# Patient Record
Sex: FEMALE | Race: WHITE | NOT HISPANIC OR LATINO | Employment: OTHER | URBAN - METROPOLITAN AREA
[De-identification: names, ages, dates, MRNs, and addresses within clinical notes are randomized per-mention and may not be internally consistent; named-entity substitution may affect disease eponyms.]

---

## 2017-02-08 ENCOUNTER — HOSPITAL ENCOUNTER (EMERGENCY)
Facility: HOSPITAL | Age: 52
Discharge: HOME | End: 2017-02-08
Attending: EMERGENCY MEDICINE
Payer: MEDICARE

## 2017-02-08 VITALS
BODY MASS INDEX: 25.69 KG/M2 | HEIGHT: 63 IN | HEART RATE: 99 BPM | SYSTOLIC BLOOD PRESSURE: 123 MMHG | TEMPERATURE: 98 F | RESPIRATION RATE: 16 BRPM | DIASTOLIC BLOOD PRESSURE: 68 MMHG | WEIGHT: 145 LBS | OXYGEN SATURATION: 98 %

## 2017-02-08 DIAGNOSIS — M25.519 SHOULDER PAIN: ICD-10-CM

## 2017-02-08 DIAGNOSIS — S46.912A SHOULDER STRAIN, LEFT, INITIAL ENCOUNTER: Primary | ICD-10-CM

## 2017-02-08 PROCEDURE — 99283 EMERGENCY DEPT VISIT LOW MDM: CPT

## 2017-02-08 PROCEDURE — 25000003 PHARM REV CODE 250: Performed by: NURSE PRACTITIONER

## 2017-02-08 RX ORDER — CYCLOBENZAPRINE HCL 10 MG
10 TABLET ORAL 3 TIMES DAILY PRN
Qty: 15 TABLET | Refills: 0 | Status: SHIPPED | OUTPATIENT
Start: 2017-02-08 | End: 2017-02-13

## 2017-02-08 RX ORDER — TRAMADOL HYDROCHLORIDE 50 MG/1
50 TABLET ORAL EVERY 6 HOURS PRN
Qty: 14 TABLET | Refills: 0 | Status: SHIPPED | OUTPATIENT
Start: 2017-02-08 | End: 2017-02-16 | Stop reason: ALTCHOICE

## 2017-02-08 RX ORDER — TRAMADOL HYDROCHLORIDE 50 MG/1
50 TABLET ORAL
Status: COMPLETED | OUTPATIENT
Start: 2017-02-08 | End: 2017-02-08

## 2017-02-08 RX ADMIN — TRAMADOL HYDROCHLORIDE 50 MG: 50 TABLET, COATED ORAL at 08:02

## 2017-02-08 NOTE — ED NOTES
Bed: Int 25  Expected date:   Expected time:   Means of arrival:   Comments:  Closed     Gigi Gould NP  02/08/17 0802

## 2017-02-08 NOTE — ED PROVIDER NOTES
Encounter Date: 2017       History     Chief Complaint   Patient presents with    Shoulder Pain     pt c/o L shoulder pain x2 months, unrelieved by ibuprofen     Review of patient's allergies indicates:   Allergen Reactions    Methadone Other (See Comments)     HPI Comments: 51 year old female with complaint of left shoulder pain X 1 1/2 months.  Pt reports that she fell 1 1/2 months ago and landed on left shoulder.  Pt never had x-ray.  Reports constant sharp pain that is worse movement and unrelieved with over the counter medication.      Past Medical History   Diagnosis Date    Anxiety     Bipolar 1 disorder     Hypothyroid      Past Medical History Pertinent Negatives   Diagnosis Date Noted    Anticoagulant long-term use 2017    Arthritis 2017     Past Surgical History   Procedure Laterality Date     section      Gastric bypass       History reviewed. No pertinent family history.  Social History   Substance Use Topics    Smoking status: Current Every Day Smoker     Packs/day: 0.50     Types: Cigarettes    Smokeless tobacco: Never Used    Alcohol use No     Review of Systems   Constitutional: Negative for fever.   HENT: Negative for sore throat.    Respiratory: Negative for shortness of breath.    Cardiovascular: Negative for chest pain.   Gastrointestinal: Negative for nausea.   Genitourinary: Negative for dysuria.   Musculoskeletal: Negative for back pain.        Left shoulder   Skin: Negative for rash.   Neurological: Negative for weakness.   Hematological: Does not bruise/bleed easily.       Physical Exam   Initial Vitals   BP Pulse Resp Temp SpO2   17 0758 17 0758 17 0758 17 0758 17 0758   123/68 99 16 97.6 °F (36.4 °C) 98 %     Physical Exam    Nursing note and vitals reviewed.  Constitutional: She appears well-developed and well-nourished.   HENT:   Head: Normocephalic and atraumatic.   Eyes: Conjunctivae and EOM are normal. Pupils are equal,  round, and reactive to light.   Neck: Normal range of motion. Neck supple.   Cardiovascular: Normal rate, regular rhythm, normal heart sounds and intact distal pulses.   Pulmonary/Chest: Breath sounds normal.   Abdominal: Soft. There is no tenderness. There is no rebound and no guarding.   Musculoskeletal: Normal range of motion.   Left lateral shoulder tenderness, pain with ROM of left shoulder, no deformities, 2+ left radial pulse   Neurological: She is alert and oriented to person, place, and time. She has normal strength and normal reflexes.   Skin: Skin is warm and dry.   Psychiatric: She has a normal mood and affect. Her behavior is normal. Thought content normal.         ED Course   Procedures  Labs Reviewed - No data to display       X-Rays:   Independently Interpreted Readings:   Other Readings:  Left humerus: neg  Left shoulder: neg                      ED Course     Clinical Impression:   The primary encounter diagnosis was Shoulder strain, left, initial encounter. A diagnosis of Shoulder pain was also pertinent to this visit.          Gigi Gould NP  02/08/17 0919

## 2017-02-08 NOTE — DISCHARGE INSTRUCTIONS
Muscle Strain in the Extremities  A muscle strain is a stretching and tearing of muscle fibers. This causes pain, especially when you move that muscle. There may also be some swelling and bruising.  Home care  · Keep the hurt area raised to reduce pain and swelling. This is especially important during the first 48 hours.  · Apply an ice pack over the injured area for 15 to 20 minutes every 3 to 6 hours. You should do this for the first 24 to 48 hours. You can make an ice pack by filling a plastic bag that seals at the top with ice cubes and then wrapping it with a thin towel. Be careful not to injure your skin with the ice treatments. Ice should never be applied directly to skin. Continue the use of ice packs for relief of pain and swelling as needed. After 48 hours, apply heat (warm shower or warm bath) for 15 to 20 minutes several times a day, or alternate ice and heat.  · You may use over-the-counter pain medicine to control pain, unless another medicine was prescribed. If you have chronic liver or kidney disease or ever had a stomach ulcer or GI bleeding, talk with your healthcare provider before using these medicines.  · For leg strains: If crutches have been recommended, dont put full weight on the hurt leg until you can do so without pain. You can return to sports when you are able to hop and run on the injured leg without pain.  Follow-up care  Follow up with your healthcare provider, or as advised.  When to seek medical advice  Call your healthcare provider right away if any of these occur:  · The toes of the injured leg become swollen, cold, blue, numb, or tingly  · Pain or swelling increases  Date Last Reviewed: 11/19/2015  © 0049-3491 [employer]. [address] [place] [address], [place] [address]. All rights reserved. This information is not intended as a substitute for professional medical care. Always follow your healthcare professional's instructions.

## 2017-02-08 NOTE — ED AVS SNAPSHOT
[facility]  [address] [facility] [address]               Jo-Ann Wray   2017  8:02 AM   ED    Description:  Female : 1965   Department:  [facility]           Your Care was Coordinated By:     Provider Role From To    Gigi Gould NP Nurse Practitioner 17 0802 --      Reason for Visit     Shoulder Pain           Diagnoses this Visit        Comments    Shoulder strain, left, initial encounter    -  Primary     Shoulder pain           ED Disposition     None           To Do List           Follow-up Information     Follow up with [facility]. Schedule an appointment as soon as possible for a visit in 2 days.    Contact information:    [phone]      [facility]     [facility] Nurse Care [facility] -  Assistance  Registered nurses in the [facility] provide clinical advisement, health education, appointment booking, and other advisory services.  Call for this free service at [phone].             Medications           Message regarding Medications     Verify the changes and/or additions to your medication regime listed below are the same as discussed with your clinician today.  If any of these changes or additions are incorrect, please notify your healthcare provider.        These medications were administered today        Dose Freq    tramadol tablet 50 mg 50 mg ED 1 Time    Sig: Take 1 tablet (50 mg total) by mouth ED 1 Time.    Class: Normal    Route: Oral           Verify that the below list of medications is an accurate representation of the medications you are currently taking.  If none reported, the list may be blank. If incorrect, please contact your healthcare provider. Carry this list with you in case of emergency.           Current Medications     tramadol tablet 50 mg Take 1 tablet (50 mg total) by mouth ED 1 Time.           Clinical Reference Information           Your Vitals  "Were     BP Pulse Temp Resp Height Weight    123/68 (BP Location: Right arm, Patient Position: Sitting) 99 97.6 °F (36.4 °C) (Oral) 16 5' 3" (1.6 m) 65.8 kg (145 lb)    SpO2 BMI             98% 25.69 kg/m2         Allergies as of 2/8/2017        Reactions    Methadone Other (See Comments)      Immunizations Administered on Date of Encounter - 2/8/2017     None      ED Micro, Lab, POCT     None      ED Imaging Orders     Start Ordered       Status Ordering Provider    02/08/17 0823 02/08/17 0823  X-Ray Shoulder Trauma Left  1 time imaging      Ordered     02/08/17 0823 02/08/17 0823  X-Ray Humerus 2 View Left  1 time imaging      Ordered         Discharge Instructions         Muscle Strain in the Extremities  A muscle strain is a stretching and tearing of muscle fibers. This causes pain, especially when you move that muscle. There may also be some swelling and bruising.  Home care  · Keep the hurt area raised to reduce pain and swelling. This is especially important during the first 48 hours.  · Apply an ice pack over the injured area for 15 to 20 minutes every 3 to 6 hours. You should do this for the first 24 to 48 hours. You can make an ice pack by filling a plastic bag that seals at the top with ice cubes and then wrapping it with a thin towel. Be careful not to injure your skin with the ice treatments. Ice should never be applied directly to skin. Continue the use of ice packs for relief of pain and swelling as needed. After 48 hours, apply heat (warm shower or warm bath) for 15 to 20 minutes several times a day, or alternate ice and heat.  · You may use over-the-counter pain medicine to control pain, unless another medicine was prescribed. If you have chronic liver or kidney disease or ever had a stomach ulcer or GI bleeding, talk with your healthcare provider before using these medicines.  · For leg strains: If crutches have been recommended, dont put full weight on the hurt leg until you can do so without " pain. You can return to sports when you are able to hop and run on the injured leg without pain.  Follow-up care  Follow up with your healthcare provider, or as advised.  When to seek medical advice  Call your healthcare provider right away if any of these occur:  · The toes of the injured leg become swollen, cold, blue, numb, or tingly  · Pain or swelling increases  Date Last Reviewed: 11/19/2015  © 0034-9267 [employer]. [address] [place] [address], [place] [address]. All rights reserved. This information is not intended as a substitute for professional medical care. Always follow your healthcare professional's instructions.          Your Scheduled Appointments     Feb 08, 2017  8:40 AM CST   Diagnostic Xray with [facility] [id number]   [facility] ([facility])    [id number] [place] [id number] [phone]              [facility] Sign-Up     Activating your [facility] account is as easy as 1-2-3!     1) Visit [email or web address], select Sign Up Now, enter this activation code and your date of birth, then select Next.  [email or web address]  Expires: 3/25/2017  8:35 AM      2) Create a username and password to use when you visit [facility] in the future and select a security question in case you lose your password and select Next.    3) Enter your e-mail address and click Sign Up!    Additional Information  If you have questions, please e-mail [email or web address] or call [phone] to talk to our [facility] staff. Remember, [facility] is NOT to be used for urgent needs. For medical emergencies, dial 911.         Smoking Cessation     If you would like to quit smoking:   You may be eligible for free services if you are a [place] resident and started smoking cigarettes before September 1, 1988.  Call the Smoking Cessation Trust (SCT) toll free at (190) 974-6207 or (692) 010-8885.   Call 4-800-QUIT-NOW if you do not meet the above criteria.             [facility] complies with applicable Federal civil rights laws and does not discriminate on the basis of race, color, national origin, age, disability, or sex.        Language Assistance Services     ATTENTION: Language assistance services are available, free of charge. Please call [phone].      ATENCIÓN: Si habla pakoañol, tiene a sher disposición servicios gratuitos de asistencia lingüística. Llame al [phone].     CHÚ Ý: N?u b?n nói Ti?ng Vi?t, có các d?ch v? h? tr? ngôn ng? mi?n phí dành cho b?n. G?i s? [phone].

## 2017-02-16 ENCOUNTER — OFFICE VISIT (OUTPATIENT)
Dept: ORTHOPEDICS | Facility: CLINIC | Age: 52
End: 2017-02-16
Payer: MEDICARE

## 2017-02-16 VITALS
SYSTOLIC BLOOD PRESSURE: 123 MMHG | BODY MASS INDEX: 25.7 KG/M2 | DIASTOLIC BLOOD PRESSURE: 67 MMHG | WEIGHT: 145.06 LBS | HEIGHT: 63 IN | HEART RATE: 106 BPM

## 2017-02-16 DIAGNOSIS — S46.812A STRAIN OF DELTOID MUSCLE, LEFT, INITIAL ENCOUNTER: ICD-10-CM

## 2017-02-16 DIAGNOSIS — M12.812 ROTATOR CUFF ARTHROPATHY, LEFT: Primary | ICD-10-CM

## 2017-02-16 DIAGNOSIS — M75.42 IMPINGEMENT SYNDROME OF LEFT SHOULDER: ICD-10-CM

## 2017-02-16 PROCEDURE — 99214 OFFICE O/P EST MOD 30 MIN: CPT | Mod: PBBFAC | Performed by: PHYSICIAN ASSISTANT

## 2017-02-16 PROCEDURE — 99999 PR PBB SHADOW E&M-EST. PATIENT-LVL IV: CPT | Mod: PBBFAC,,, | Performed by: PHYSICIAN ASSISTANT

## 2017-02-16 PROCEDURE — 99203 OFFICE O/P NEW LOW 30 MIN: CPT | Mod: S$PBB,,, | Performed by: PHYSICIAN ASSISTANT

## 2017-02-16 RX ORDER — BENZTROPINE MESYLATE 1 MG/1
TABLET ORAL
COMMUNITY
Start: 2017-02-02 | End: 2017-02-16

## 2017-02-16 RX ORDER — TIOTROPIUM BROMIDE 18 UG/1
CAPSULE ORAL; RESPIRATORY (INHALATION)
COMMUNITY
Start: 2017-02-02 | End: 2018-02-06

## 2017-02-16 RX ORDER — ACAMPROSATE CALCIUM 333 MG/1
TABLET, DELAYED RELEASE ORAL
COMMUNITY
Start: 2017-01-16 | End: 2017-02-16

## 2017-02-16 RX ORDER — TOPIRAMATE 100 MG/1
50 TABLET, FILM COATED ORAL 2 TIMES DAILY
Status: ON HOLD | COMMUNITY
Start: 2017-02-02 | End: 2018-08-21 | Stop reason: HOSPADM

## 2017-02-16 RX ORDER — DIVALPROEX SODIUM 250 MG/1
TABLET, FILM COATED, EXTENDED RELEASE ORAL
COMMUNITY
Start: 2016-12-12 | End: 2017-02-16

## 2017-02-16 RX ORDER — BUSPIRONE HYDROCHLORIDE 10 MG/1
10 TABLET ORAL 2 TIMES DAILY
Status: ON HOLD | COMMUNITY
Start: 2016-12-05 | End: 2018-08-21 | Stop reason: HOSPADM

## 2017-02-16 RX ORDER — TRAZODONE HYDROCHLORIDE 100 MG/1
200 TABLET ORAL NIGHTLY
Status: ON HOLD | COMMUNITY
Start: 2017-02-02 | End: 2018-08-21 | Stop reason: HOSPADM

## 2017-02-16 RX ORDER — TRAZODONE HYDROCHLORIDE 150 MG/1
TABLET ORAL
COMMUNITY
Start: 2016-12-05 | End: 2018-02-06 | Stop reason: SDUPTHER

## 2017-02-16 RX ORDER — TRIAMCINOLONE ACETONIDE 1 MG/ML
LOTION TOPICAL
COMMUNITY
Start: 2016-12-12 | End: 2018-02-06

## 2017-02-16 RX ORDER — DULOXETIN HYDROCHLORIDE 60 MG/1
60 CAPSULE, DELAYED RELEASE ORAL 2 TIMES DAILY
COMMUNITY
Start: 2016-12-20 | End: 2018-02-06

## 2017-02-16 RX ORDER — OMEPRAZOLE 40 MG/1
40 CAPSULE, DELAYED RELEASE ORAL DAILY
Status: ON HOLD | COMMUNITY
Start: 2017-02-02 | End: 2018-10-20

## 2017-02-16 RX ORDER — MIRTAZAPINE 30 MG/1
TABLET, FILM COATED ORAL
COMMUNITY
Start: 2016-12-05 | End: 2017-02-16

## 2017-02-16 RX ORDER — LURASIDONE HYDROCHLORIDE 80 MG/1
80 TABLET, FILM COATED ORAL NIGHTLY
Status: ON HOLD | COMMUNITY
Start: 2017-02-02 | End: 2018-08-21 | Stop reason: HOSPADM

## 2017-02-16 RX ORDER — METHYLPREDNISOLONE 4 MG/1
TABLET ORAL
Qty: 21 PACKAGE | Refills: 0 | Status: SHIPPED | OUTPATIENT
Start: 2017-02-16 | End: 2017-11-18 | Stop reason: ALTCHOICE

## 2017-02-16 RX ORDER — DIVALPROEX SODIUM 500 MG/1
1500 TABLET, DELAYED RELEASE ORAL DAILY
Status: ON HOLD | COMMUNITY
Start: 2017-02-02 | End: 2018-08-21 | Stop reason: HOSPADM

## 2017-02-16 RX ORDER — IPRATROPIUM BROMIDE 17 UG/1
AEROSOL, METERED RESPIRATORY (INHALATION)
COMMUNITY
Start: 2017-02-02 | End: 2018-02-06

## 2017-02-16 RX ORDER — MELOXICAM 15 MG/1
15 TABLET ORAL DAILY
Qty: 30 TABLET | Refills: 2 | Status: SHIPPED | OUTPATIENT
Start: 2017-02-16 | End: 2018-02-06

## 2017-02-16 RX ORDER — LEVOTHYROXINE SODIUM 75 UG/1
75 TABLET ORAL DAILY
Status: ON HOLD | COMMUNITY
Start: 2017-02-02 | End: 2018-08-21 | Stop reason: HOSPADM

## 2017-02-16 RX ORDER — GABAPENTIN 300 MG/1
300 CAPSULE ORAL 3 TIMES DAILY
Status: ON HOLD | COMMUNITY
Start: 2017-02-02 | End: 2018-08-21 | Stop reason: HOSPADM

## 2017-02-16 NOTE — MR AVS SNAPSHOT
[facility]  [address] [facility] [address]  Phone: [phone]  Fax: [phone]                  Jo-Ann Wray   2017 10:00 AM   Office Visit    Description:  Female : 1965   Provider:  Raina An PA-C   Department:  [facility]           Reason for Visit     Left Shoulder - Pain           Diagnoses this Visit        Comments    Rotator cuff arthropathy, left    -  Primary     Impingement syndrome of left shoulder         Strain of deltoid muscle, left, initial encounter                To Do List           Future Appointments        Provider Department Dept Phone    3/17/2017 9:45 AM Raina An PA-C [facility] [phone]      Goals (5 Years of Data)     None      Follow-Up and Disposition     Return in about 4 weeks (around 3/16/2017).       These Medications        Disp Refills Start End    meloxicam (MOBIC) 15 MG tablet 30 tablet 2 2017     Take 1 tablet (15 mg total) by mouth once daily. - Oral    Pharmacy: [address] Ph #: [phone]       methylPREDNISolone (MEDROL DOSEPACK) 4 mg tablet 21 Package 0 2017     use as directed    Pharmacy: [address] Ph #: [phone]         [facility]     [facility] On Call Nurse Care Line - 24/7 Assistance  Registered nurses in the [facility] Center provide clinical advisement, health education, appointment booking, and other advisory services.  Call for this free service at [phone].             Medications           Message regarding Medications     Verify the changes and/or additions to your medication regime listed below are the same as discussed with your clinician today.  If any of these changes or additions are incorrect, please notify your healthcare provider.        START taking these NEW medications        Refills    meloxicam (MOBIC) 15 MG  "tablet 2    Sig: Take 1 tablet (15 mg total) by mouth once daily.    Class: Normal    Route: Oral    methylPREDNISolone (MEDROL DOSEPACK) 4 mg tablet 0    Sig: use as directed    Class: Normal      STOP taking these medications     tramadol (ULTRAM) 50 mg tablet Take 1 tablet (50 mg total) by mouth every 6 (six) hours as needed.    acamprosate (CAMPRAL) 333 mg tablet     benztropine (COGENTIN) 1 MG tablet     mirtazapine (REMERON) 30 MG tablet     divalproex ER (DEPAKOTE ER) 250 MG 24 hr tablet            Verify that the below list of medications is an accurate representation of the medications you are currently taking.  If none reported, the list may be blank. If incorrect, please contact your healthcare provider. Carry this list with you in case of emergency.           Current Medications     ATROVENT HFA 17 mcg/actuation inhaler     busPIRone (BUSPAR) 10 MG tablet     divalproex (DEPAKOTE) 500 MG TbEC     duloxetine (CYMBALTA) 60 MG capsule     gabapentin (NEURONTIN) 300 MG capsule     LATUDA 80 mg Tab tablet     levothyroxine (SYNTHROID) 75 MCG tablet     omeprazole (PRILOSEC) 40 MG capsule     SPIRIVA WITH HANDIHALER 18 mcg inhalation capsule     topiramate (TOPAMAX) 100 MG tablet     trazodone (DESYREL) 100 MG tablet     trazodone (DESYREL) 150 MG tablet     triamcinolone acetonide 0.1% (KENALOG) 0.1 % Lotn     meloxicam (MOBIC) 15 MG tablet Take 1 tablet (15 mg total) by mouth once daily.    methylPREDNISolone (MEDROL DOSEPACK) 4 mg tablet use as directed           Clinical Reference Information           Your Vitals Were     BP Pulse Height Weight BMI    123/67 106 5' 3" (1.6 m) 65.8 kg (145 lb 1 oz) 25.7 kg/m2      Blood Pressure          Most Recent Value    BP  123/67      Allergies as of 2/16/2017     Methadone      Immunizations Administered on Date of Encounter - 2/16/2017     None      Orders Placed During Today's Visit      Normal Orders This Visit    Ambulatory Referral to Physical/Occupational " Therapy       Smoking Cessation     If you would like to quit smoking:   You may be eligible for free services if you are a [place] resident and started smoking cigarettes before September 1, 1988.  Call the Smoking Cessation Trust (SCT) toll free at (959) 107-2531 or (841) 672-1600.   Call 1-800-QUIT-NOW if you do not meet the above criteria.            Language Assistance Services     ATTENTION: Language assistance services are available, free of charge. Please call [phone].      ATENCIÓN: Si habla charlie, tiene a sher disposición servicios gratuitos de asistencia lingüística. Llame al [phone].     CHÚ Ý: N?u b?n nói Ti?ng Vi?t, có các d?ch v? h? tr? ngôn ng? mi?n phí dành cho b?n. G?i s? [phone].         [facility] complies with applicable Federal civil rights laws and does not discriminate on the basis of race, color, national origin, age, disability, or sex.

## 2017-02-16 NOTE — PROGRESS NOTES
Subjective:      Patient ID: Jo-Ann Wray is a 51 y.o. female.    Chief Complaint: Pain of the Left Shoulder    HPI  The patient presents to clinic for evaluation of nondominant left shoulder and arm pain. She is an ED follow-up from 2/8/17. She was prescribed tramadol and diagnosed with shoulder pain. X-rays taken then did not reveal an acute abnormality. 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 She states that she is disabled (learning disability). She has a history of 3 dislocations while growing up.     She fell about 1.5-2 months ago while trying to get on the bus. She missed putting her foot on the step and fell backwards and tried to catch herself with her left arm. She tried to ignore her pain but her pain is not going away. She rates her current pain a 9 on a pain scale of 0-10.   She has no family but lives with [facility]. She relies on the bus for transportation.       Review of Systems   Constitution: Negative for chills, decreased appetite, weight gain and weight loss.   HENT: Negative for congestion, ear pain, hearing loss and sore throat.    Eyes: Negative for blurred vision, double vision, vision loss in left eye, vision loss in right eye and visual disturbance.   Cardiovascular: Negative for chest pain, irregular heartbeat, leg swelling and palpitations.   Respiratory: Negative for cough and shortness of breath.    Endocrine: Negative for cold intolerance and heat intolerance.   Hematologic/Lymphatic: Negative for adenopathy. Does not bruise/bleed easily.   Skin: Negative for color change, nail changes, rash and suspicious lesions.   Musculoskeletal: Positive for joint pain and myalgias.   Gastrointestinal: Negative for abdominal pain, nausea and vomiting.   Genitourinary: Negative for bladder incontinence and dysuria.   Neurological: Negative for dizziness, paresthesias, sensory change and tremors.   Psychiatric/Behavioral: Negative for altered mental status,  depression, memory loss and substance abuse.   Allergic/Immunologic: Negative for hives and persistent infections.         Objective:            General    Vitals reviewed.  Constitutional: She is oriented to person, place, and time. She appears well-developed and well-nourished.   HENT:   Head: Normocephalic.   Nose: Nose normal.   Eyes: EOM are normal. Pupils are equal, round, and reactive to light.   Neck: Normal range of motion. Neck supple.   Cardiovascular: Normal rate and regular rhythm.    Pulmonary/Chest: Effort normal.   Abdominal: Soft. She exhibits no distension. There is no tenderness.   Neurological: She is alert and oriented to person, place, and time.   Psychiatric: She has a normal mood and affect. Her behavior is normal.     General Musculoskeletal Exam   Gait: normal   Pelvic Obliquity: none    Right Ankle/Foot Exam     Alignment   Forefoot Alignment: normal    Tests   Varus tilt: negative  Heel Walk: able to perform  Tiptoe Walk: able to perform    Other   Sensation: normal    Left Ankle/Foot Exam     Alignment   Forefoot Alignment: normal    Tests   Varus tilt: negative  Heel Walk: able to perform  Tiptoe Walk: able to perform    Other   Sensation: normal    Right Knee Exam     Inspection   Erythema: absent  Scars: absent  Effusion: effusion    Tenderness   The patient is experiencing no tenderness.         Range of Motion   Extension: normal   Flexion: normal     Tests   Ligament Examination   Posterior Sag Test: negative  Posterolateral Corner: unstable (>15 degrees difference)  Patella   Patellar Tracking: normal    Other   Sensation: normal    Left Knee Exam     Inspection   Erythema: absent  Scars: absent  Effusion: absent    Tenderness   The patient is experiencing no tenderness.         Range of Motion   Extension: normal   Flexion: normal     Tests   Stability   Posterior Sag Test: negative  Posterolateral Corner: unstable (>15 degrees difference)  Patella   Patellar Tracking:  normal    Other   Sensation: normal    Right Hip Exam     Inspection   Swelling: absent  Bruising: absent    Other   Sensation: normal  Left Hip Exam     Inspection   Swelling: absent  Bruising: absent    Other   Sensation: normal      Back (L-Spine & T-Spine) / Neck (C-Spine) Exam   Back exam is normal.    Tenderness   The patient is tender to palpation of the left scapular.     Neck (C-Spine) Range of Motion   Flexion:     Normal  Extension: Normal  Right Lateral Bend: normal  Left Lateral Bend: normal  Right Rotation: normal  Left Rotation: normal    Spinal Sensation   Right Side Sensation  C-Spine Level: normal   L-Spine Level: normal  S-Spine Level: normal  T-Spine Level: normal  Left Side Sensation  C-Spine Level: normal  L-Spine Level: normal  S-Spine Level: normal  T-Spine Level: normal    Other She has no scoliosis .      Right Hand/Wrist Exam     Inspection   Deformity: Wrist - deformity     Tests   Phalens Sign: negative  Tinels Sign (Medial Nerve): negative  Finkelstein: negative  Cubital Tunnel Compression Test: negative      Other     Neuorologic Exam    Median Distribution: normal  Ulnar Distribution: normal  Radial Distribution: normal      Left Hand/Wrist Exam     Inspection   Deformity: Wrist - absent     Tests   Phalens Sign: negative  Tinels Sign (Medial Nerve): negative  Finkelstein: negative  Cubital Tunnel Compression Test: negative      Other     Sensory Exam  Median Distribution: normal  Ulnar Distribution: normal  Radial Distribution: normal      Right Elbow Exam     Inspection   Effusion: absent  Bruising: absent  Deformity: absent    Tests Tinel's Sign (cubital tunnel): negative    Other   Sensation: normal      Left Elbow Exam     Inspection   Effusion: absent  Bruising: absent  Deformity: absent    Tests Tinel's Sign (cubital tunnel): negative    Other   Sensation: normal    Right Shoulder Exam     Range of Motion   Active Abduction: normal   Passive Abduction: normal   Extension:  normal   Forward Flexion: normal   Forward Elevation: normal  Adduction: normal  External Rotation 0 degrees: normal   Internal Rotation 0 degrees: normal     Tests & Signs   Apprehension: negative  Cross Arm: negative  Impingement: negative    Other   Sensation: normal    Left Shoulder Exam     Inspection/Observation   Swelling: absent    Tenderness   The patient is tender to palpation of the acromioclavicular joint and biceps tendon.    Range of Motion   Active Abduction: abnormal   Passive Abduction: abnormal   Extension: abnormal   Forward Flexion: abnormal   Forward Elevation: abnormal  Adduction: abnormal  External Rotation 0 degrees: abnormal   Internal Rotation 0 degrees: abnormal     Tests & Signs   Apprehension: negative  Cross Arm: positive  Hawkin's test: positive  Impingement: positive  Bear Hug: positive    Other   Sensation: normal     Comments:  Also pain over deltoid      Muscle Strength   Right Upper Extremity   Wrist Extension:    Wrist Flexion:    :    Elbow Pronation:     Elbow Supination:     Elbow Extension:   Elbow Flexion:   Intrinsics:   EPL (Extensor Pollicis Longus):   Left Upper Extremity  Shoulder Abduction: 3/5   Shoulder Internal Rotation: 3/5   Shoulder External Rotation: 3/5   Supraspinatus: 3/5/5   Subscapularis: 3/5/5   Biceps: 3/5/5   Wrist Extension:    Wrist Flexion:    :     Elbow Pronation:     Elbow Supination:     Elbow Extension:   Elbow Flexion:   Intrinsics:   EPL (Extensor Pollicis Longus):   Right Lower Extremity   Hip Abduction:    Quadriceps:     Hamstrin/5   Left Lower Extremity   Hip Abduction:    Quadriceps:     Hamstrin/5     Reflexes     Left Side  Biceps:  2+  Triceps:  2+  Quadriceps:  2+  Achilles:  2+    Right Side   Biceps:  2+  Triceps:  2+  Quadriceps:  2+  Achilles:  2+    Vascular Exam     Right Pulses      Radial:                    2+      Left  Pulses      Radial:                    2+      Capillary Refill  Right Hand: normal capillary refill  Left Hand: normal capillary refill      X-rays:    XR HUMERUS 2 VIEW LEFT    Clinical history: left upper arm pain, injury    Findings: No fracture, suspicious bone marrow lesion or other acute abnormality is seen in the humerus.  Joint alignment appears anatomic.   Impression    No acute abnormality seen in the left humerus     3 view x-ray of the left shoulder    Clinical History:     Pain in the left shoulder    Findings:     There is no fracture. There is no dislocation.   Impression       Normal study.             Assessment:       Encounter Diagnoses   Name Primary?    Rotator cuff arthropathy, left Yes    Impingement syndrome of left shoulder     Strain of deltoid muscle, left, initial encounter           Plan:       Jo-Ann was seen today for pain.    Diagnoses and all orders for this visit:    Rotator cuff arthropathy, left  -     meloxicam (MOBIC) 15 MG tablet; Take 1 tablet (15 mg total) by mouth once daily.  -     methylPREDNISolone (MEDROL DOSEPACK) 4 mg tablet; use as directed  -     Ambulatory Referral to Physical/Occupational Therapy    Impingement syndrome of left shoulder  -     meloxicam (MOBIC) 15 MG tablet; Take 1 tablet (15 mg total) by mouth once daily.  -     methylPREDNISolone (MEDROL DOSEPACK) 4 mg tablet; use as directed  -     Ambulatory Referral to Physical/Occupational Therapy    Strain of deltoid muscle, left, initial encounter  -     meloxicam (MOBIC) 15 MG tablet; Take 1 tablet (15 mg total) by mouth once daily.  -     methylPREDNISolone (MEDROL DOSEPACK) 4 mg tablet; use as directed  -     Ambulatory Referral to Physical/Occupational Therapy      The patient was prescribed an anti-inflammatory and a Medrol Dosepak.  I would like for her to attend outpatient physical therapy twice a week.  The patient was instructed on Codman exercises for home and the use of warm compresses.   She'll return for follow-up in 4 weeks.  If her shoulder pain and range of motion have not improved, we will consider MRI for further evaluation and to rule out rotator cuff pathology and labrum tear.

## 2017-04-07 ENCOUNTER — OFFICE VISIT (OUTPATIENT)
Dept: ORTHOPEDICS | Facility: CLINIC | Age: 52
End: 2017-04-07
Payer: MEDICARE

## 2017-04-07 VITALS
BODY MASS INDEX: 25.7 KG/M2 | HEART RATE: 80 BPM | SYSTOLIC BLOOD PRESSURE: 105 MMHG | DIASTOLIC BLOOD PRESSURE: 71 MMHG | WEIGHT: 145.06 LBS | HEIGHT: 63 IN

## 2017-04-07 DIAGNOSIS — M75.42 IMPINGEMENT SYNDROME OF LEFT SHOULDER: ICD-10-CM

## 2017-04-07 DIAGNOSIS — S46.812A STRAIN OF DELTOID MUSCLE, LEFT, INITIAL ENCOUNTER: ICD-10-CM

## 2017-04-07 DIAGNOSIS — M12.812 ROTATOR CUFF ARTHROPATHY, LEFT: Primary | ICD-10-CM

## 2017-04-07 PROCEDURE — 99999 PR PBB SHADOW E&M-EST. PATIENT-LVL III: CPT | Mod: PBBFAC,,, | Performed by: PHYSICIAN ASSISTANT

## 2017-04-07 PROCEDURE — 99213 OFFICE O/P EST LOW 20 MIN: CPT | Mod: PBBFAC | Performed by: PHYSICIAN ASSISTANT

## 2017-04-07 PROCEDURE — 20610 DRAIN/INJ JOINT/BURSA W/O US: CPT | Mod: S$PBB,LT,, | Performed by: PHYSICIAN ASSISTANT

## 2017-04-07 PROCEDURE — 99213 OFFICE O/P EST LOW 20 MIN: CPT | Mod: S$PBB,25,, | Performed by: PHYSICIAN ASSISTANT

## 2017-04-07 PROCEDURE — 20610 DRAIN/INJ JOINT/BURSA W/O US: CPT | Mod: PBBFAC | Performed by: PHYSICIAN ASSISTANT

## 2017-04-07 RX ORDER — METHYLPREDNISOLONE ACETATE 80 MG/ML
80 INJECTION, SUSPENSION INTRA-ARTICULAR; INTRALESIONAL; INTRAMUSCULAR; SOFT TISSUE
Status: COMPLETED | OUTPATIENT
Start: 2017-04-07 | End: 2017-04-07

## 2017-04-07 RX ADMIN — METHYLPREDNISOLONE ACETATE 80 MG: 80 INJECTION, SUSPENSION INTRALESIONAL; INTRAMUSCULAR; INTRASYNOVIAL; SOFT TISSUE at 08:04

## 2017-04-07 NOTE — PROGRESS NOTES
Subjective:      Patient ID: Jo-Ann Wray is a 51 y.o. female.    Chief Complaint: Pain of the Left Shoulder    HPI    The patient presents to clinic for follow-up of nondominant left shoulder pain.  She states that she has completed a Medrol Dosepak and meloxicam.  The patient was given a referral to attend therapy but states that she had to get therapy at home.  Therapy has discharged her.  She had services with [facility].  The patient still has home health once a week for skilled nursing visits.  Patient states that she has no improvement with her shoulder pain.  Her pain is locazlies above her elbow joint, radiating up to shoulder and scapular region.  The patient rates her current discomfort a 9 on a pain scale of 1-10.    Review of Systems   Constitution: Negative for chills, fever and night sweats.   Respiratory: Negative for cough, shortness of breath and wheezing.    Musculoskeletal: Positive for joint pain and stiffness.   Gastrointestinal: Negative for diarrhea, nausea and vomiting.   Neurological: Negative for brief paralysis.   Psychiatric/Behavioral: Negative for altered mental status.         Objective:            General    Constitutional: She is oriented to person, place, and time. She appears well-developed and well-nourished.   Neck: Normal range of motion.   Cardiovascular: Normal rate and regular rhythm.    Pulmonary/Chest: Effort normal.   Abdominal: Soft.   Neurological: She is alert and oriented to person, place, and time.   Psychiatric: She has a normal mood and affect. Her behavior is normal.         Back (L-Spine & T-Spine) / Neck (C-Spine) Exam     Tenderness   The patient is tender to palpation of the left scapular.       Left Shoulder Exam     Tenderness   The patient is tender to palpation of the acromioclavicular joint, acromion, biceps tendon and greater tuberosity.    Range of Motion   Active Abduction: abnormal   Passive Abduction: abnormal   Extension: abnormal   Forward  Flexion: abnormal   Forward Elevation: abnormal  Adduction: abnormal  External Rotation 0 degrees: abnormal   Internal Rotation 0 degrees: abnormal     Tests & Signs   Cross Arm: positive  Drop Arm: positive  Hawkin's test: positive  Impingement: positive  Rotator Cuff Painful Arc/Range: moderate  Bear Hug: positive      Muscle Strength   Right Upper Extremity   Shoulder Abduction: 4/5   Shoulder Internal Rotation: 4/5   Shoulder External Rotation: 4/5   Supraspinatus: 4/5/5   Subscapularis: 4/5/5   Biceps: 4/5/5   Left Upper Extremity  Shoulder Abduction: 3/5   Shoulder Internal Rotation: 3/5   Shoulder External Rotation: 3/5   Supraspinatus: 3/5/5   Subscapularis: 3/5/5   Biceps: 3/5/5                     Assessment:       Encounter Diagnoses   Name Primary?    Rotator cuff arthropathy, left Yes    Impingement syndrome of left shoulder     Strain of deltoid muscle, left, initial encounter           Plan:       Jo-Ann was seen today for pain.    Diagnoses and all orders for this visit:    Rotator cuff arthropathy, left  -     methylPREDNISolone acetate injection 80 mg; Inject 1 mL (80 mg total) into the articular space one time.    Impingement syndrome of left shoulder  -     methylPREDNISolone acetate injection 80 mg; Inject 1 mL (80 mg total) into the articular space one time.    Strain of deltoid muscle, left, initial encounter  -     methylPREDNISolone acetate injection 80 mg; Inject 1 mL (80 mg total) into the articular space one time.        The patient was given a Depo-Medrol and Xylocaine injection today for left shoulder discomfort.  I instructed her to alternate cold and warm compresses.  The patient was provided with refills during her last visit so she still should have some meloxicam left.  If she has ran out of her prescriptions, she will notify the office and request refills.  I told the patient to continue with at-home exercises demonstrated from her home physical therapist.  She'll return in  approximately 5 weeks for follow-up of her shoulder.  If her pain and range of motion have not improved, I will order a MRI to further evaluate her shoulder joint.        Procedure note:    Preoperative diagnosis: Rotator cuff arthropathy of the left shoulder, impingement syndrome    Postoperative diagnosis: same    Procedure performed Injection  left shoulder(s)     Description:  Timeout was performed and the patient's ALLERGIES were verified.  Her left shoulder was marked with a yes and prepped with Betadine prior to the injection.  Under sterile conditions the patient's left shoulder(s) was injected through the subacromial space with Depro-Medrol 80 and 1 cc of 2% Xylocaine.  The patient tolerated the procedure without incident.  A Band-Aid was applied to the needle site.    Raina An PA-C

## 2017-04-07 NOTE — MR AVS SNAPSHOT
[facility]  [address]  [place] [address]  Phone: [phone]  Fax: [phone]                  Jo-Ann Wray   2017 8:15 AM   Office Visit    Description:  Female : 1965   Provider:  Raina An PA-C   Department:  [facility]           Reason for Visit     Left Shoulder - Pain           Diagnoses this Visit        Comments    Rotator cuff arthropathy, left    -  Primary     Impingement syndrome of left shoulder         Strain of deltoid muscle, left, initial encounter                To Do List           Future Appointments        Provider Department Dept Phone    2017 8:45 AM Raina An PA-C [facility] [phone]      Goals (5 Years of Data)     None      [facility]     [facility] On Call Nurse Care Line -  Assistance  Unless otherwise directed by your provider, please contact [facility], our nurse care line that is available for  assistance.     Registered nurses in the [facility] On Call Center provide: appointment scheduling, clinical advisement, health education, and other advisory services.  Call: [phone] (toll free)               Medications           Message regarding Medications     Verify the changes and/or additions to your medication regime listed below are the same as discussed with your clinician today.  If any of these changes or additions are incorrect, please notify your healthcare provider.        These medications were administered today        Dose Freq    methylPREDNISolone acetate injection 80 mg 80 mg Clinic/HOD 1 time    Sig: Inject 1 mL (80 mg total) into the articular space one time.    Class: Normal    Route: Intra-articular           Verify that the below list of medications is an accurate representation of the medications you are currently taking.  If none reported, the list may be blank. If incorrect, please contact your healthcare provider. Carry this list with you  "in case of emergency.           Current Medications     ATROVENT HFA 17 mcg/actuation inhaler     busPIRone (BUSPAR) 10 MG tablet     divalproex (DEPAKOTE) 500 MG TbEC     duloxetine (CYMBALTA) 60 MG capsule     gabapentin (NEURONTIN) 300 MG capsule     LATUDA 80 mg Tab tablet     levothyroxine (SYNTHROID) 75 MCG tablet     meloxicam (MOBIC) 15 MG tablet Take 1 tablet (15 mg total) by mouth once daily.    methylPREDNISolone (MEDROL DOSEPACK) 4 mg tablet use as directed    omeprazole (PRILOSEC) 40 MG capsule     SPIRIVA WITH HANDIHALER 18 mcg inhalation capsule     topiramate (TOPAMAX) 100 MG tablet     trazodone (DESYREL) 100 MG tablet     trazodone (DESYREL) 150 MG tablet     triamcinolone acetonide 0.1% (KENALOG) 0.1 % Lotn            Clinical Reference Information           Your Vitals Were     Height Weight BMI          5' 3" (1.6 m) 65.8 kg (145 lb 1 oz) 25.7 kg/m2        Allergies as of 4/7/2017     Methadone      Immunizations Administered on Date of Encounter - 4/7/2017     None      Smoking Cessation     If you would like to quit smoking:   You may be eligible for free services if you are a [place] resident and started smoking cigarettes before September 1, 1988.  Call the Smoking Cessation Trust ([facility]) toll free at [phone] or [phone].   Call 1-800-QUIT-NOW if you do not meet the above criteria.   Contact us via email: [email or web address]   View our website for more information: [email or web address]        Language Assistance Services     ATTENTION: Language assistance services are available, free of charge. Please call [phone].      ATENCIÓN: Si habla español, tiene a sher disposición servicios gratuitos de asistencia lingüística. Llame al [phone].     CHÚ Ý: N?u b?n nói Ti?ng Vi?t, có các d?ch v? h? tr? ngôn ng? mi?n phí dành cho b?n. G?i s? [phone].         [facility] complies with applicable Federal civil rights laws and does not discriminate on " the basis of race, color, national origin, age, disability, or sex.

## 2017-05-11 DIAGNOSIS — M25.512 LEFT SHOULDER PAIN, UNSPECIFIED CHRONICITY: Primary | ICD-10-CM

## 2017-05-16 ENCOUNTER — HOSPITAL ENCOUNTER (EMERGENCY)
Facility: HOSPITAL | Age: 52
Discharge: PSYCHIATRIC OR SUBSTANCE USE FACILITY | End: 2017-05-16
Attending: EMERGENCY MEDICINE
Payer: MEDICARE

## 2017-05-16 VITALS
DIASTOLIC BLOOD PRESSURE: 64 MMHG | HEIGHT: 63 IN | BODY MASS INDEX: 26.58 KG/M2 | HEART RATE: 87 BPM | TEMPERATURE: 99 F | RESPIRATION RATE: 18 BRPM | SYSTOLIC BLOOD PRESSURE: 113 MMHG | OXYGEN SATURATION: 95 % | WEIGHT: 150 LBS

## 2017-05-16 DIAGNOSIS — F19.10 SUBSTANCE ABUSE: ICD-10-CM

## 2017-05-16 DIAGNOSIS — R45.89 SUICIDAL BEHAVIOR WITHOUT ATTEMPTED SELF-INJURY: Primary | ICD-10-CM

## 2017-05-16 DIAGNOSIS — R42 DIZZINESS: ICD-10-CM

## 2017-05-16 LAB
ALBUMIN SERPL BCP-MCNC: 3.3 G/DL
ALP SERPL-CCNC: 60 U/L
ALT SERPL W/O P-5'-P-CCNC: 25 U/L
AMPHET+METHAMPHET UR QL: NEGATIVE
ANION GAP SERPL CALC-SCNC: 10 MMOL/L
AST SERPL-CCNC: 40 U/L
B-HCG UR QL: NEGATIVE
BARBITURATES UR QL SCN>200 NG/ML: NEGATIVE
BASOPHILS # BLD AUTO: 0.02 K/UL
BASOPHILS NFR BLD: 0.4 %
BENZODIAZ UR QL SCN>200 NG/ML: NEGATIVE
BILIRUB SERPL-MCNC: 0.7 MG/DL
BILIRUB UR QL STRIP: NEGATIVE
BUN SERPL-MCNC: 8 MG/DL
BZE UR QL SCN: NORMAL
CALCIUM SERPL-MCNC: 8 MG/DL
CANNABINOIDS UR QL SCN: NEGATIVE
CHLORIDE SERPL-SCNC: 106 MMOL/L
CLARITY UR: CLEAR
CO2 SERPL-SCNC: 22 MMOL/L
COLOR UR: YELLOW
CREAT SERPL-MCNC: 0.7 MG/DL
CREAT UR-MCNC: 131.2 MG/DL
DIFFERENTIAL METHOD: ABNORMAL
EOSINOPHIL # BLD AUTO: 0 K/UL
EOSINOPHIL NFR BLD: 0.4 %
ERYTHROCYTE [DISTWIDTH] IN BLOOD BY AUTOMATED COUNT: 15.8 %
EST. GFR  (AFRICAN AMERICAN): >60 ML/MIN/1.73 M^2
EST. GFR  (NON AFRICAN AMERICAN): >60 ML/MIN/1.73 M^2
ETHANOL SERPL-MCNC: <10 MG/DL
GLUCOSE SERPL-MCNC: 97 MG/DL
GLUCOSE UR QL STRIP: NEGATIVE
HCT VFR BLD AUTO: 34.9 %
HGB BLD-MCNC: 11.7 G/DL
HGB UR QL STRIP: NEGATIVE
KETONES UR QL STRIP: NEGATIVE
LEUKOCYTE ESTERASE UR QL STRIP: NEGATIVE
LYMPHOCYTES # BLD AUTO: 1.6 K/UL
LYMPHOCYTES NFR BLD: 29.1 %
MCH RBC QN AUTO: 31.2 PG
MCHC RBC AUTO-ENTMCNC: 33.5 %
MCV RBC AUTO: 93 FL
METHADONE UR QL SCN>300 NG/ML: NEGATIVE
MONOCYTES # BLD AUTO: 0.4 K/UL
MONOCYTES NFR BLD: 7.2 %
NEUTROPHILS # BLD AUTO: 3.4 K/UL
NEUTROPHILS NFR BLD: 62.9 %
NITRITE UR QL STRIP: NEGATIVE
OPIATES UR QL SCN: NEGATIVE
PCP UR QL SCN>25 NG/ML: NEGATIVE
PH UR STRIP: 5 [PH] (ref 5–8)
PLATELET # BLD AUTO: 261 K/UL
PMV BLD AUTO: 9.3 FL
POTASSIUM SERPL-SCNC: 3.8 MMOL/L
PROT SERPL-MCNC: 6.3 G/DL
PROT UR QL STRIP: NEGATIVE
RBC # BLD AUTO: 3.75 M/UL
SODIUM SERPL-SCNC: 138 MMOL/L
SP GR UR STRIP: 1.02 (ref 1–1.03)
TOXICOLOGY INFORMATION: NORMAL
TSH SERPL DL<=0.005 MIU/L-ACNC: 3.12 UIU/ML
URN SPEC COLLECT METH UR: NORMAL
UROBILINOGEN UR STRIP-ACNC: NEGATIVE EU/DL
VALPROATE SERPL-MCNC: <12.5 UG/ML
WBC # BLD AUTO: 5.4 K/UL

## 2017-05-16 PROCEDURE — 84443 ASSAY THYROID STIM HORMONE: CPT

## 2017-05-16 PROCEDURE — 96361 HYDRATE IV INFUSION ADD-ON: CPT

## 2017-05-16 PROCEDURE — 85025 COMPLETE CBC W/AUTO DIFF WBC: CPT

## 2017-05-16 PROCEDURE — 82570 ASSAY OF URINE CREATININE: CPT

## 2017-05-16 PROCEDURE — 81025 URINE PREGNANCY TEST: CPT

## 2017-05-16 PROCEDURE — 93005 ELECTROCARDIOGRAM TRACING: CPT

## 2017-05-16 PROCEDURE — 99285 EMERGENCY DEPT VISIT HI MDM: CPT | Mod: 25

## 2017-05-16 PROCEDURE — 96360 HYDRATION IV INFUSION INIT: CPT

## 2017-05-16 PROCEDURE — 93010 ELECTROCARDIOGRAM REPORT: CPT | Mod: ,,, | Performed by: INTERNAL MEDICINE

## 2017-05-16 PROCEDURE — 25000003 PHARM REV CODE 250: Performed by: EMERGENCY MEDICINE

## 2017-05-16 PROCEDURE — 80320 DRUG SCREEN QUANTALCOHOLS: CPT

## 2017-05-16 PROCEDURE — 80164 ASSAY DIPROPYLACETIC ACD TOT: CPT

## 2017-05-16 PROCEDURE — 80053 COMPREHEN METABOLIC PANEL: CPT

## 2017-05-16 PROCEDURE — G0425 INPT/ED TELECONSULT30: HCPCS | Mod: GT,S$PBB,, | Performed by: PSYCHIATRY & NEUROLOGY

## 2017-05-16 PROCEDURE — 81003 URINALYSIS AUTO W/O SCOPE: CPT

## 2017-05-16 RX ORDER — BENZTROPINE MESYLATE 1 MG/1
1 TABLET ORAL 2 TIMES DAILY
Status: ON HOLD | COMMUNITY
Start: 2017-04-24 | End: 2018-08-21 | Stop reason: HOSPADM

## 2017-05-16 RX ORDER — ACAMPROSATE CALCIUM 333 MG/1
666 TABLET, DELAYED RELEASE ORAL 3 TIMES DAILY
Status: ON HOLD | COMMUNITY
Start: 2017-05-03 | End: 2018-08-21 | Stop reason: HOSPADM

## 2017-05-16 RX ADMIN — SODIUM CHLORIDE 1000 ML: 0.9 INJECTION, SOLUTION INTRAVENOUS at 10:05

## 2017-05-16 NOTE — ED NOTES
PEC process discussed with pt, pt rights packet left at bs. Pt signed Acknowledgment of Notification of Rights.     Pt safeword is Taylor.    Pt emergency contact is Taylor Skinner, [phone]

## 2017-05-16 NOTE — ED NOTES
Pt belongings logged and secured in PEC locked #28.  Belongings include red bag with medications: Campral, inhaler, Depakote, Cymbalta, Neurontin, Latuda, Synthroid, Topamax, Trazodone.   Brown purse, pair of jeans, grey shirt, white shirt, underwear, socks, black pants, green shirt, black shoes, cell phone with , glasses, wallet.
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
## 2017-05-16 NOTE — ED NOTES
Pt has been accepted to [facility] via [facility]. Accepting MD is Dr. Calderon. Call report number is [phone].

## 2017-05-16 NOTE — ED NOTES
2 [employer] workers arrived for pt transport, 2 pt belonging bags given to [employer]. Security called to walk pt out.

## 2017-05-16 NOTE — ED NOTES
Pt lying in bed in NAD, VSS, respirations equal and unlabored. Pt bed is locked and low. Pt is dressed in guevara gown and yellow socks, room is secured per PEC protocol. Pt being directly monitored by leobardo Paredes at this time. Will continue to monitor.

## 2017-05-16 NOTE — ED NOTES
Faxed clinical packet to [facility], [facility], [facility], [facility], [facility], [facility], [facility], [facility], [facility], [facility], [facility], [facility]), [facility] ([facility]), [facility]. Awaiting placement at this time.

## 2017-05-16 NOTE — ED NOTES
Pt ambulated to bathroom with no assistance. Pt brought blanket for comfort per request. leobardo Whitlock at this time performing 15 min checks on pt.

## 2017-05-16 NOTE — ED NOTES
Pt is dressed in grey gown and yellow socks, room is secured per P[facility] protocol. Pt being directly monitored by leobardo Whitlock at this time. Will continue to monitor.

## 2017-08-05 ENCOUNTER — HOSPITAL ENCOUNTER (EMERGENCY)
Facility: HOSPITAL | Age: 52
Discharge: HOME | End: 2017-08-05
Attending: EMERGENCY MEDICINE
Payer: MEDICARE

## 2017-08-05 VITALS
RESPIRATION RATE: 16 BRPM | HEIGHT: 63 IN | HEART RATE: 82 BPM | DIASTOLIC BLOOD PRESSURE: 69 MMHG | WEIGHT: 150 LBS | OXYGEN SATURATION: 97 % | BODY MASS INDEX: 26.58 KG/M2 | TEMPERATURE: 99 F | SYSTOLIC BLOOD PRESSURE: 130 MMHG

## 2017-08-05 DIAGNOSIS — S39.012A LUMBAR STRAIN, INITIAL ENCOUNTER: Primary | ICD-10-CM

## 2017-08-05 PROCEDURE — 99283 EMERGENCY DEPT VISIT LOW MDM: CPT

## 2017-08-05 RX ORDER — TRAMADOL HYDROCHLORIDE 50 MG/1
50 TABLET ORAL EVERY 6 HOURS PRN
Qty: 14 TABLET | Refills: 0 | Status: SHIPPED | OUTPATIENT
Start: 2017-08-05 | End: 2017-08-15

## 2017-08-05 RX ORDER — PREDNISONE 50 MG/1
50 TABLET ORAL DAILY
Qty: 5 TABLET | Refills: 0 | Status: SHIPPED | OUTPATIENT
Start: 2017-08-05 | End: 2017-08-10

## 2017-08-05 NOTE — ED PROVIDER NOTES
Encounter Date: 2017       History     Chief Complaint   Patient presents with    Hip Pain     chronic right hip pain and low back pain x 1 week.     52 year old female with complaint of right lower back pain with radiation into right hip X 1 weeks.  No fall.  NO trauma.  Moderate pain.  Constant aching pain worse with movement.  No alleviating factors. No loss of bowel or bladder function.           Review of patient's allergies indicates:   Allergen Reactions    Methadone Other (See Comments)     Past Medical History:   Diagnosis Date    Anxiety     Bipolar 1 disorder     Hypothyroid      Past Surgical History:   Procedure Laterality Date     SECTION      GASTRIC BYPASS       History reviewed. No pertinent family history.  Social History   Substance Use Topics    Smoking status: Current Every Day Smoker     Packs/day: 0.50     Types: Cigarettes    Smokeless tobacco: Never Used    Alcohol use No     Review of Systems   Constitutional: Negative for fever.   HENT: Negative for sore throat.    Respiratory: Negative for shortness of breath.    Cardiovascular: Negative for chest pain.   Gastrointestinal: Negative for nausea.   Genitourinary: Negative for dysuria.   Musculoskeletal: Positive for back pain.   Skin: Negative for rash.   Neurological: Negative for weakness.   Hematological: Does not bruise/bleed easily.       Physical Exam     Initial Vitals [17 0948]   BP Pulse Resp Temp SpO2   130/69 82 16 98.8 °F (37.1 °C) 97 %      MAP       89.33         Physical Exam    Nursing note and vitals reviewed.  Constitutional: She appears well-developed and well-nourished.   HENT:   Head: Normocephalic and atraumatic.   Eyes: Conjunctivae and EOM are normal. Pupils are equal, round, and reactive to light.   Neck: Normal range of motion. Neck supple.   Cardiovascular: Normal rate, regular rhythm, normal heart sounds and intact distal pulses.   Pulmonary/Chest: Breath sounds normal.   Abdominal:  Soft. There is no tenderness. There is no rebound and no guarding.   Musculoskeletal: Normal range of motion.   Right lower lumbar tenderness, pain with ROM of lumbar spine, able to walk on heels and toes   Neurological: She is alert and oriented to person, place, and time. She has normal strength and normal reflexes.   Skin: Skin is warm and dry.   Psychiatric: She has a normal mood and affect. Her behavior is normal. Thought content normal.         ED Course   Procedures  Labs Reviewed - No data to display                            ED Course     Clinical Impression:   The encounter diagnosis was Lumbar strain, initial encounter.                           Gigi Gould NP  08/05/17 0957

## 2017-09-12 ENCOUNTER — HOSPITAL ENCOUNTER (EMERGENCY)
Facility: HOSPITAL | Age: 52
Discharge: HOME | End: 2017-09-12
Attending: EMERGENCY MEDICINE
Payer: MEDICARE

## 2017-09-12 VITALS
RESPIRATION RATE: 18 BRPM | OXYGEN SATURATION: 100 % | TEMPERATURE: 100 F | HEART RATE: 82 BPM | BODY MASS INDEX: 30.83 KG/M2 | WEIGHT: 174 LBS | DIASTOLIC BLOOD PRESSURE: 59 MMHG | HEIGHT: 63 IN | SYSTOLIC BLOOD PRESSURE: 108 MMHG

## 2017-09-12 DIAGNOSIS — N39.0 ACUTE UTI: Primary | ICD-10-CM

## 2017-09-12 LAB
ALBUMIN SERPL BCP-MCNC: 2.2 G/DL
ALP SERPL-CCNC: 60 U/L
ALT SERPL W/O P-5'-P-CCNC: 5 U/L
ANION GAP SERPL CALC-SCNC: 12 MMOL/L
AST SERPL-CCNC: 9 U/L
BACTERIA #/AREA URNS HPF: ABNORMAL /HPF
BASOPHILS # BLD AUTO: 0.01 K/UL
BASOPHILS NFR BLD: 0.1 %
BILIRUB SERPL-MCNC: 0.2 MG/DL
BILIRUB UR QL STRIP: ABNORMAL
BUN SERPL-MCNC: 16 MG/DL
CALCIUM SERPL-MCNC: 8.6 MG/DL
CHLORIDE SERPL-SCNC: 108 MMOL/L
CLARITY UR: CLEAR
CO2 SERPL-SCNC: 20 MMOL/L
COLOR UR: YELLOW
CREAT SERPL-MCNC: 0.6 MG/DL
DIFFERENTIAL METHOD: ABNORMAL
EOSINOPHIL # BLD AUTO: 0 K/UL
EOSINOPHIL NFR BLD: 0.1 %
ERYTHROCYTE [DISTWIDTH] IN BLOOD BY AUTOMATED COUNT: 17.3 %
EST. GFR  (AFRICAN AMERICAN): >60 ML/MIN/1.73 M^2
EST. GFR  (NON AFRICAN AMERICAN): >60 ML/MIN/1.73 M^2
GLUCOSE SERPL-MCNC: 79 MG/DL
GLUCOSE UR QL STRIP: NEGATIVE
HCT VFR BLD AUTO: 34.9 %
HGB BLD-MCNC: 11.8 G/DL
HGB UR QL STRIP: ABNORMAL
HYALINE CASTS #/AREA URNS LPF: 0 /LPF
KETONES UR QL STRIP: ABNORMAL
LEUKOCYTE ESTERASE UR QL STRIP: ABNORMAL
LIPASE SERPL-CCNC: <3 U/L
LYMPHOCYTES # BLD AUTO: 1 K/UL
LYMPHOCYTES NFR BLD: 11.6 %
MCH RBC QN AUTO: 32.1 PG
MCHC RBC AUTO-ENTMCNC: 33.8 G/DL
MCV RBC AUTO: 95 FL
MICROSCOPIC COMMENT: ABNORMAL
MONOCYTES # BLD AUTO: 0.7 K/UL
MONOCYTES NFR BLD: 8.3 %
NEUTROPHILS # BLD AUTO: 6.7 K/UL
NEUTROPHILS NFR BLD: 79.9 %
NITRITE UR QL STRIP: NEGATIVE
PH UR STRIP: 6 [PH] (ref 5–8)
PLATELET # BLD AUTO: 245 K/UL
PMV BLD AUTO: 9.5 FL
POTASSIUM SERPL-SCNC: 3.6 MMOL/L
PROT SERPL-MCNC: 6.5 G/DL
PROT UR QL STRIP: ABNORMAL
RBC # BLD AUTO: 3.68 M/UL
RBC #/AREA URNS HPF: 6 /HPF (ref 0–4)
SODIUM SERPL-SCNC: 140 MMOL/L
SP GR UR STRIP: >=1.03 (ref 1–1.03)
SQUAMOUS #/AREA URNS HPF: 5 /HPF
URN SPEC COLLECT METH UR: ABNORMAL
UROBILINOGEN UR STRIP-ACNC: >=8 EU/DL
WBC # BLD AUTO: 8.42 K/UL
WBC #/AREA URNS HPF: 40 /HPF (ref 0–5)

## 2017-09-12 PROCEDURE — 80053 COMPREHEN METABOLIC PANEL: CPT

## 2017-09-12 PROCEDURE — 63600175 PHARM REV CODE 636 W HCPCS: Performed by: EMERGENCY MEDICINE

## 2017-09-12 PROCEDURE — 85025 COMPLETE CBC W/AUTO DIFF WBC: CPT

## 2017-09-12 PROCEDURE — 96374 THER/PROPH/DIAG INJ IV PUSH: CPT

## 2017-09-12 PROCEDURE — 25000003 PHARM REV CODE 250: Performed by: EMERGENCY MEDICINE

## 2017-09-12 PROCEDURE — 83690 ASSAY OF LIPASE: CPT

## 2017-09-12 PROCEDURE — 96361 HYDRATE IV INFUSION ADD-ON: CPT

## 2017-09-12 PROCEDURE — 81000 URINALYSIS NONAUTO W/SCOPE: CPT

## 2017-09-12 PROCEDURE — 25500020 PHARM REV CODE 255: Performed by: EMERGENCY MEDICINE

## 2017-09-12 PROCEDURE — 99284 EMERGENCY DEPT VISIT MOD MDM: CPT | Mod: 25

## 2017-09-12 RX ORDER — CIPROFLOXACIN 500 MG/1
500 TABLET ORAL
Status: COMPLETED | OUTPATIENT
Start: 2017-09-12 | End: 2017-09-12

## 2017-09-12 RX ORDER — MIRTAZAPINE 30 MG/1
30 TABLET, FILM COATED ORAL NIGHTLY
COMMUNITY
End: 2018-02-06

## 2017-09-12 RX ORDER — KETOROLAC TROMETHAMINE 30 MG/ML
15 INJECTION, SOLUTION INTRAMUSCULAR; INTRAVENOUS
Status: COMPLETED | OUTPATIENT
Start: 2017-09-12 | End: 2017-09-12

## 2017-09-12 RX ORDER — NYSTATIN 100000 [USP'U]/ML
SUSPENSION ORAL 4 TIMES DAILY
COMMUNITY
End: 2018-02-06

## 2017-09-12 RX ORDER — CIPROFLOXACIN 500 MG/1
500 TABLET ORAL 2 TIMES DAILY
Qty: 20 TABLET | Refills: 0 | Status: SHIPPED | OUTPATIENT
Start: 2017-09-12 | End: 2017-09-22

## 2017-09-12 RX ORDER — NALTREXONE HYDROCHLORIDE 50 MG/1
50 TABLET, FILM COATED ORAL NIGHTLY
COMMUNITY
End: 2018-02-06

## 2017-09-12 RX ADMIN — CIPROFLOXACIN HYDROCHLORIDE 500 MG: 500 TABLET, FILM COATED ORAL at 07:09

## 2017-09-12 RX ADMIN — SODIUM CHLORIDE 1000 ML: 0.9 INJECTION, SOLUTION INTRAVENOUS at 05:09

## 2017-09-12 RX ADMIN — IOHEXOL 75 ML: 350 INJECTION, SOLUTION INTRAVENOUS at 06:09

## 2017-09-12 RX ADMIN — KETOROLAC TROMETHAMINE 15 MG: 30 INJECTION, SOLUTION INTRAMUSCULAR at 04:09

## 2017-09-12 NOTE — ED PROVIDER NOTES
SCRIBE #1 NOTE: I, Jamilah Henderson, am scribing for, and in the presence of, Kierra Schulz MD. I have scribed the entire note.      History      Chief Complaint   Patient presents with    Pelvic Pain     lower abd/pelvic pain x 1 week. complaints of urinary frequency       Review of patient's allergies indicates:   Allergen Reactions    Methadone Other (See Comments)        HPI   HPI    2017, 3:52 PM   History obtained from the patient      History of Present Illness: Jo-Ann Wray is a 52 y.o. female patient who presents to the Emergency Department for lower abd pain which onset gradually today. Symptoms are constant and moderate in severity. Pt is concerned she may have HIV. Pt also c/o of vaginal discharge onset 4 days ago. Pt denies being sexually active. No mitigating or exacerbating factors reported. Associated sxs include diarrhea. Patient denies any fever, n/v, CP, SOB, HA, dizziness, weakness, numbness, vaginal pain, vaginal bleedings, genital sores, dysuria, hematuria, and all other sxs at this time. No further complaints or concerns at this time.       Arrival mode: Personal vehicle    PCP: Primary Doctor No       Past Medical History:  Past Medical History:   Diagnosis Date    Anxiety     Bipolar 1 disorder     Hypothyroid        Past Surgical History:  Past Surgical History:   Procedure Laterality Date     SECTION      GASTRIC BYPASS           Family History:  Reviewed not relevant     Social History:  Social History     Social History Main Topics    Smoking status: Current Every Day Smoker     Packs/day: 0.50     Types: Cigarettes    Smokeless tobacco: Never Used    Alcohol use No    Drug use:     Sexual activity: Unknown       ROS   Review of Systems   Constitutional: Negative for fever.   HENT: Negative for sore throat.    Respiratory: Negative for shortness of breath.    Cardiovascular: Negative for chest pain.   Gastrointestinal: Positive for abdominal pain  (lower) and diarrhea. Negative for blood in stool, constipation, nausea and vomiting.   Genitourinary: Positive for vaginal discharge. Negative for decreased urine volume, difficulty urinating, dysuria, flank pain, genital sores, hematuria, pelvic pain, vaginal bleeding and vaginal pain.   Musculoskeletal: Negative for back pain.   Skin: Negative for rash.   Neurological: Negative for dizziness, weakness, light-headedness, numbness and headaches.   Hematological: Does not bruise/bleed easily.       Physical Exam      Initial Vitals [09/12/17 1543]   BP Pulse Resp Temp SpO2   110/67 102 18 99.9 °F (37.7 °C) 99 %      MAP       81.33          Physical Exam  Nursing Notes and Vital Signs Reviewed.  Constitutional: Patient is in no acute distress. Well-developed and well-nourished.  Head: Atraumatic. Normocephalic.  Eyes: PERRL. EOM intact. Conjunctivae are not pale. No scleral icterus.  ENT: Mucous membranes are moist. Oropharynx is clear and symmetri.    Neck: Supple. Full ROM. No lymphadenopathy.  Cardiovascular: Tachycardic rate. Regular rhythm. No murmurs, rubs, or gallops. Distal pulses are 2+ and symmetric.  Pulmonary/Chest: No respiratory distress. Clear to auscultation bilaterally. No wheezing, rales, or rhonchi.  Abdominal: Soft and non-distended.  Diffuse abd tenderness.  No rebound, guarding, or rigidity. Good bowel sounds.  Genitourinary: No CVA tenderness  Musculoskeletal: Moves all extremities. No obvious deformities. No edema. No calf tenderness.  Skin: Warm and dry.  Neurological:  Alert, awake, and appropriate.  Normal speech.  No acute focal neurological deficits are appreciated.  Psychiatric: Normal affect. Good eye contact. Appropriate in content.    ED Course    Procedures  ED Vital Signs:  Vitals:    09/12/17 1543 09/12/17 1732 09/12/17 1747 09/12/17 1802   BP: 110/67 (!) 89/50 (!) 86/49 (!) 100/58   Pulse: 102 79 76 81   Resp: 18      Temp: 99.9 °F (37.7 °C)      TempSrc: Oral      SpO2: 99% 98%  "98% 100%   Weight: 78.9 kg (174 lb)      Height: 5' 3" (1.6 m)       09/12/17 1827 09/12/17 1847 09/12/17 1902 09/12/17 1914   BP: (!) 87/49 (!) 98/55 103/60    Pulse: 79 80 82 83   Resp:       Temp:       TempSrc:       SpO2: 99% 99% 99%    Weight:       Height:        09/12/17 1917 09/12/17 1919   BP: (!) 108/59    Pulse: 80 82   Resp:     Temp:     TempSrc:     SpO2: 100%    Weight:     Height:         Abnormal Lab Results:  Labs Reviewed   CBC W/ AUTO DIFFERENTIAL - Abnormal; Notable for the following:        Result Value    RBC 3.68 (*)     Hemoglobin 11.8 (*)     Hematocrit 34.9 (*)     MCH 32.1 (*)     RDW 17.3 (*)     Gran% 79.9 (*)     Lymph% 11.6 (*)     All other components within normal limits   COMPREHENSIVE METABOLIC PANEL - Abnormal; Notable for the following:     CO2 20 (*)     Calcium 8.6 (*)     Albumin 2.2 (*)     AST 9 (*)     ALT 5 (*)     All other components within normal limits   LIPASE - Abnormal; Notable for the following:     Lipase <3 (*)     All other components within normal limits   URINALYSIS - Abnormal; Notable for the following:     Specific Gravity, UA >=1.030 (*)     Protein, UA 2+ (*)     Ketones, UA 2+ (*)     Bilirubin (UA) 1+ (*)     Occult Blood UA Trace (*)     Urobilinogen, UA >=8.0 (*)     Leukocytes, UA Trace (*)     All other components within normal limits   URINALYSIS MICROSCOPIC - Abnormal; Notable for the following:     RBC, UA 6 (*)     WBC, UA 40 (*)     Bacteria, UA Few (*)     All other components within normal limits   RAPID HIV   RAPID HIV        All Lab Results:  Results for orders placed or performed during the hospital encounter of 09/12/17   CBC W/ AUTO DIFFERENTIAL   Result Value Ref Range    WBC 8.42 3.90 - 12.70 K/uL    RBC 3.68 (L) 4.00 - 5.40 M/uL    Hemoglobin 11.8 (L) 12.0 - 16.0 g/dL    Hematocrit 34.9 (L) 37.0 - 48.5 %    MCV 95 82 - 98 fL    MCH 32.1 (H) 27.0 - 31.0 pg    MCHC 33.8 32.0 - 36.0 g/dL    RDW 17.3 (H) 11.5 - 14.5 %    Platelets 245 " 150 - 350 K/uL    MPV 9.5 9.2 - 12.9 fL    Gran # 6.7 1.8 - 7.7 K/uL    Lymph # 1.0 1.0 - 4.8 K/uL    Mono # 0.7 0.3 - 1.0 K/uL    Eos # 0.0 0.0 - 0.5 K/uL    Baso # 0.01 0.00 - 0.20 K/uL    Gran% 79.9 (H) 38.0 - 73.0 %    Lymph% 11.6 (L) 18.0 - 48.0 %    Mono% 8.3 4.0 - 15.0 %    Eosinophil% 0.1 0.0 - 8.0 %    Basophil% 0.1 0.0 - 1.9 %    Differential Method Automated    Comp. Metabolic Panel   Result Value Ref Range    Sodium 140 136 - 145 mmol/L    Potassium 3.6 3.5 - 5.1 mmol/L    Chloride 108 95 - 110 mmol/L    CO2 20 (L) 23 - 29 mmol/L    Glucose 79 70 - 110 mg/dL    BUN, Bld 16 6 - 20 mg/dL    Creatinine 0.6 0.5 - 1.4 mg/dL    Calcium 8.6 (L) 8.7 - 10.5 mg/dL    Total Protein 6.5 6.0 - 8.4 g/dL    Albumin 2.2 (L) 3.5 - 5.2 g/dL    Total Bilirubin 0.2 0.1 - 1.0 mg/dL    Alkaline Phosphatase 60 55 - 135 U/L    AST 9 (L) 10 - 40 U/L    ALT 5 (L) 10 - 44 U/L    Anion Gap 12 8 - 16 mmol/L    eGFR if African American >60 >60 mL/min/1.73 m^2    eGFR if non African American >60 >60 mL/min/1.73 m^2   Lipase   Result Value Ref Range    Lipase <3 (L) 4 - 60 U/L   Urinalysis - Clean Catch   Result Value Ref Range    Specimen UA Urine, Clean Catch     Color, UA Yellow Yellow, Straw, Kristin    Appearance, UA Clear Clear    pH, UA 6.0 5.0 - 8.0    Specific Gravity, UA >=1.030 (A) 1.005 - 1.030    Protein, UA 2+ (A) Negative    Glucose, UA Negative Negative    Ketones, UA 2+ (A) Negative    Bilirubin (UA) 1+ (A) Negative    Occult Blood UA Trace (A) Negative    Nitrite, UA Negative Negative    Urobilinogen, UA >=8.0 (A) <2.0 EU/dL    Leukocytes, UA Trace (A) Negative   Urinalysis Microscopic   Result Value Ref Range    RBC, UA 6 (H) 0 - 4 /hpf    WBC, UA 40 (H) 0 - 5 /hpf    Bacteria, UA Few (A) None-Occ /hpf    Squam Epithel, UA 5 /hpf    Hyaline Casts, UA 0 0-1/lpf /lpf    Microscopic Comment SEE COMMENT        Imaging Results:  Imaging Results          CT Abdomen Pelvis With Contrast (Final result)  Result time 09/12/17  18:31:43    Final result by Sukumar Allen MD (09/12/17 18:31:43)                 Impression:        Indeterminate bilateral adrenal nodules.    Postoperative change of the stomach.    Fluid filled and minimally dilated distal small bowel with increased enhancement of the mid and distal small bowel wall.  Findings are nonspecific but could be due to enteritis.    Enhancing mass at the fundus of the uterus most consistent with a uterine fibroid.              All CT scans at this facility use dose modulation, iterative reconstruction and/or weight based dosing when appropriate to reduced radiation dose to as low as reasonably achievable.        Electronically signed by: SUKUMAR ALLEN MD  Date:     09/12/17  Time:    18:31              Narrative:    EXAM:CT ABDOMEN PELVIS WITH CONTRAST 09/12/17 17:51:21      HISTORY: Generalized abdominal pain.    TECHNIQUE: Standard axial imaging performed with reformatted sagittal and coronal images.       COMPARISON: None      FINDINGS:    Lung bases are clear        The liver is normal.  Portal vein is patent.  Gallbladder is surgically absent.    The spleen and pancreas are normal.  Aorta and IVC are normal.    There is an indeterminate left adrenal nodule measured 1.4 cm in maximal dimension.  Indeterminate right adrenal nodule measures 2.2 cm in maximal dimension.    The kidneys and ureters are normal.    Postoperative changes of the stomach identified.  Distal small intestine mildly dilated and fluid-filled.  Increased bowel wall enhancement of the mid and distal small bowel is suspicious for a neuritis partially fluid filled colon noted.  Otherwise the colon is normal.  The rectum is normal.    The bladder demonstrates circumferential bladder wall thickening suspicious for cystitis.  Uterus is heterogeneous with evidence of a fibroid at the fundus measuring 4.2 cm.  Nabothian cyst measures 1.4 cm.  Adnexa regions are normal        No significant bony abnormality                                       The Emergency Provider reviewed the vital signs and test results, which are outlined above.    ED Discussion     6:44 PM: Reassessed pt at this time. Discussed with pt all pertinent ED information and results. Discussed pt dx and plan of tx. Gave pt all f/u and return to the ED instructions. All questions and concerns were addressed at this time. Pt expresses understanding of information and instructions, and is comfortable with plan to discharge. Pt is stable for discharge.      ED Medication(s):  Medications   ketorolac injection 15 mg (15 mg Intravenous Given 9/12/17 1633)   sodium chloride 0.9% bolus 1,000 mL (0 mLs Intravenous Stopped 9/12/17 1928)   omnipaque 350 iohexol 75 mL (75 mLs Intravenous Given 9/12/17 1814)   ciprofloxacin HCl tablet 500 mg (500 mg Oral Given 9/12/17 1913)       Discharge Medication List as of 9/12/2017  6:43 PM      START taking these medications    Details   ciprofloxacin HCl (CIPRO) 500 MG tablet Take 1 tablet (500 mg total) by mouth 2 (two) times daily., Starting Tue 9/12/2017, Until Fri 9/22/2017, Print             Follow-up Information     Appointment Desk for primary physician.    Contact information:  [phone]                   Medical Decision Making    Medical Decision Making:   Clinical Tests:   Lab Tests: Ordered and Reviewed  Radiological Study: Ordered and Reviewed           Scribe Attestation:   Scribe #1: I performed the above scribed service and the documentation accurately describes the services I performed. I attest to the accuracy of the note.    Attending:   Physician Attestation Statement for Scribe #1: I, Kierra Schulz MD, personally performed the services described in this documentation, as scribed by Jamilah Henderson, in my presence, and it is both accurate and complete.          Clinical Impression       ICD-10-CM ICD-9-CM   1. Acute UTI N39.0 599.0       Disposition:   Disposition: Discharged  Condition:  Stable         Kierra Schulz MD  09/12/17 2000

## 2017-11-18 ENCOUNTER — HOSPITAL ENCOUNTER (EMERGENCY)
Facility: HOSPITAL | Age: 52
Discharge: HOME | End: 2017-11-18
Attending: EMERGENCY MEDICINE
Payer: MEDICARE

## 2017-11-18 VITALS
BODY MASS INDEX: 27.72 KG/M2 | WEIGHT: 156.44 LBS | SYSTOLIC BLOOD PRESSURE: 105 MMHG | HEIGHT: 63 IN | DIASTOLIC BLOOD PRESSURE: 47 MMHG | RESPIRATION RATE: 17 BRPM | HEART RATE: 80 BPM | OXYGEN SATURATION: 99 % | TEMPERATURE: 98 F

## 2017-11-18 DIAGNOSIS — R05.9 COUGH: ICD-10-CM

## 2017-11-18 DIAGNOSIS — J44.9 CHRONIC OBSTRUCTIVE PULMONARY DISEASE, UNSPECIFIED COPD TYPE: ICD-10-CM

## 2017-11-18 DIAGNOSIS — J20.9 ACUTE BRONCHITIS, UNSPECIFIED ORGANISM: Primary | ICD-10-CM

## 2017-11-18 PROCEDURE — 63600175 PHARM REV CODE 636 W HCPCS: Performed by: EMERGENCY MEDICINE

## 2017-11-18 PROCEDURE — 99284 EMERGENCY DEPT VISIT MOD MDM: CPT | Mod: 25

## 2017-11-18 PROCEDURE — 25000242 PHARM REV CODE 250 ALT 637 W/ HCPCS: Performed by: EMERGENCY MEDICINE

## 2017-11-18 PROCEDURE — 94640 AIRWAY INHALATION TREATMENT: CPT

## 2017-11-18 PROCEDURE — 25000003 PHARM REV CODE 250: Performed by: EMERGENCY MEDICINE

## 2017-11-18 RX ORDER — CYCLOBENZAPRINE HCL 10 MG
10 TABLET ORAL
Status: COMPLETED | OUTPATIENT
Start: 2017-11-18 | End: 2017-11-18

## 2017-11-18 RX ORDER — PREDNISONE 20 MG/1
40 TABLET ORAL DAILY
Qty: 10 TABLET | Refills: 0 | Status: SHIPPED | OUTPATIENT
Start: 2017-11-18 | End: 2017-11-23

## 2017-11-18 RX ORDER — IPRATROPIUM BROMIDE AND ALBUTEROL SULFATE 2.5; .5 MG/3ML; MG/3ML
3 SOLUTION RESPIRATORY (INHALATION)
Status: COMPLETED | OUTPATIENT
Start: 2017-11-18 | End: 2017-11-18

## 2017-11-18 RX ORDER — BENZONATATE 100 MG/1
100 CAPSULE ORAL 3 TIMES DAILY PRN
Qty: 20 CAPSULE | Refills: 0 | Status: SHIPPED | OUTPATIENT
Start: 2017-11-18 | End: 2017-11-28

## 2017-11-18 RX ORDER — PREDNISONE 20 MG/1
60 TABLET ORAL
Status: COMPLETED | OUTPATIENT
Start: 2017-11-18 | End: 2017-11-18

## 2017-11-18 RX ADMIN — CYCLOBENZAPRINE HYDROCHLORIDE 10 MG: 10 TABLET, FILM COATED ORAL at 08:11

## 2017-11-18 RX ADMIN — PREDNISONE 60 MG: 20 TABLET ORAL at 08:11

## 2017-11-18 RX ADMIN — IPRATROPIUM BROMIDE AND ALBUTEROL SULFATE 3 ML: .5; 3 SOLUTION RESPIRATORY (INHALATION) at 07:11

## 2017-11-18 NOTE — ED PROVIDER NOTES
SCRIBE #1 NOTE: I, Jamilah Henderson, am scribing for, and in the presence of, Nicanor Garnica MD. I have scribed the entire note.      History      Chief Complaint   Patient presents with    Shortness of Breath     patient c/o SOB and back pain for the last few days, patient states she has COPD       Review of patient's allergies indicates:   Allergen Reactions    Methadone Other (See Comments)        HPI   HPI    2017, 7:39 AM   History obtained from the patient      History of Present Illness: oJ-Ann Wray is a 52 y.o. female patient who presents to the Emergency Department for SOB which onset gradually 1 day ago. Symptoms are constant and moderate in severity. Pt states that her NP sent pt to ED for further evaluation of air passage in lungs. No mitigating or exacerbating factors reported. Associated sxs include wheezing, cough, congestion, rhinorrhea, nausea, and emesis. Patient denies any fever, abd pain, diarrhea, CP, leg swelling, chest tightness, HA, dizziness, weakness, numbness, lightheadedness, and all other sxs at this time. Prior Tx includes z-pack. No further complaints or concerns at this time.       Arrival mode: Personal vehicle    PCP: Primary Doctor No       Past Medical History:  Past Medical History:   Diagnosis Date    Anxiety     Bipolar 1 disorder     Hypothyroid        Past Surgical History:  Past Surgical History:   Procedure Laterality Date     SECTION      GASTRIC BYPASS           Family History:  History reviewed. No pertinent family history.    Social History:  Social History     Social History Main Topics    Smoking status: Current Every Day Smoker     Packs/day: 0.50     Types: Cigarettes    Smokeless tobacco: Never Used    Alcohol use No    Drug use:     Sexual activity: Not given       ROS   Review of Systems   Constitutional: Negative for fever.   HENT: Positive for congestion and rhinorrhea. Negative for sore throat.    Respiratory: Positive for cough,  shortness of breath and wheezing. Negative for chest tightness.    Cardiovascular: Negative for chest pain.   Gastrointestinal: Positive for nausea and vomiting. Negative for abdominal pain, blood in stool, constipation and diarrhea.   Genitourinary: Negative for decreased urine volume, difficulty urinating, dysuria, flank pain and hematuria.   Musculoskeletal: Negative for back pain.   Skin: Negative for rash.   Neurological: Negative for dizziness, weakness, light-headedness, numbness and headaches.   Hematological: Does not bruise/bleed easily.       Physical Exam      Initial Vitals [11/18/17 0706]   BP Pulse Resp Temp SpO2   130/62 100 (!) 22 98.2 °F (36.8 °C) 96 %      MAP       84.67          Physical Exam  Nursing Notes and Vital Signs Reviewed.  Constitutional: Patient is in no acute distress. Well-developed and well-nourished.  Head: Atraumatic. Normocephalic.  Eyes: PERRL. EOM intact. Conjunctivae are not pale. No scleral icterus.  ENT: Mucous membranes are moist. Oropharynx is clear and symmetric.    Neck: Supple. Full ROM. No lymphadenopathy.  Cardiovascular: Regular rate. Regular rhythm. No murmurs, rubs, or gallops. Distal pulses are 2+ and symmetric.  Pulmonary/Chest: No respiratory distress. Mild expiratory wheezes. No rales.  Abdominal: Soft and non-distended.  There is no tenderness.  No rebound, guarding, or rigidity. Good bowel sounds.  Genitourinary: No CVA tenderness  Musculoskeletal: Moves all extremities. No obvious deformities. No edema. No calf tenderness.  Skin: Warm and dry.  Neurological:  Alert, awake, and appropriate.  Normal speech.  No acute focal neurological deficits are appreciated.  Psychiatric: Normal affect. Good eye contact. Appropriate in content.    ED Course    Procedures  ED Vital Signs:  Vitals:    11/18/17 0706 11/18/17 0753 11/18/17 0805 11/18/17 0820   BP: 130/62  97/62 (!) 105/47   Pulse: 100 71 79 80   Resp: (!) 22 16 16 17   Temp: 98.2 °F (36.8 °C)      SpO2: 96%  "98% 98% 99%   Weight: 70.9 kg (156 lb 6.7 oz)      Height: 5' 3" (1.6 m)          Imaging Results:    Imaging Results          X-Ray Chest PA And Lateral (Final result)  Result time 11/18/17 09:54:54    Final result by Mattie Bui MD (11/18/17 09:54:54)                 Impression:     No acute cardiopulmonary disease.            Electronically signed by: MATTIE BUI MD  Date:     11/18/17  Time:    09:54              Narrative:    EXAM:   [id number] CHEST PA AND LATERAL    CLINICAL HISTORY:  Asthma    COMPARISON: No relevant priors    Findings:     The lungs are clear. The cardiac silhouette is within normal limits.                            The EKG was ordered, reviewed, and independently interpreted by the ED provider.  Interpretation time: 0714  Rate: 89 BPM  Rhythm: normal sinus rhythm  Interpretation: Nonspecific ST wave abnormality. No STEMI.  When compared to EKG performed 5/16/17, there are no significant changes.         The Emergency Provider reviewed the vital signs and test results, which are outlined above.    ED Discussion     8:12 AM: Re-evaluated pt. Pt is resting comfortably and is in no acute distress.  Pt states that she is sore from coughing. Pt is requesting a cough suppressant and pain medicine for soreness. D/w pt any concerns expressed at this time. Answered all questions. Pt expresses understanding at this time.    8:17 AM: Reassessed pt at this time.  Pt states that she is not taking any steroids at this time. Discussed with pt all pertinent ED information and results. Discussed pt dx and plan of tx. Gave pt all f/u and return to the ED instructions. All questions and concerns were addressed at this time. Pt expresses understanding of information and instructions, and is comfortable with plan to discharge. Pt is stable for discharge.      ED Medication(s):  Medications   albuterol-ipratropium 2.5mg-0.5mg/3mL nebulizer solution 3 mL (3 mLs Nebulization Given 11/18/17 " 0753)   predniSONE tablet 60 mg (60 mg Oral Given 11/18/17 0803)   cyclobenzaprine tablet 10 mg (10 mg Oral Given 11/18/17 0825)       Discharge Medication List as of 11/18/2017  8:18 AM      START taking these medications    Details   benzonatate (TESSALON) 100 MG capsule Take 1 capsule (100 mg total) by mouth 3 (three) times daily as needed for Cough., Starting Sat 11/18/2017, Until Tue 11/28/2017, Print      predniSONE (DELTASONE) 20 MG tablet Take 2 tablets (40 mg total) by mouth once daily., Starting Sat 11/18/2017, Until Thu 11/23/2017, Print             Follow-up Information     Schedule an appointment as soon as possible for a visit  with Primary Doctor No.    Why:  Follow up with your primary doctor in the next 2-3 days for a re-check.  Return to the emergency department for any worsening signs or symptoms.                   Medical Decision Making    Medical Decision Making:   Clinical Tests:   Radiological Study: Reviewed and Ordered  Medical Tests: Ordered and Reviewed           Scribe Attestation:   Scribe #1: I performed the above scribed service and the documentation accurately describes the services I performed. I attest to the accuracy of the note.    Attending:   Physician Attestation Statement for Scribe #1: I, Nicanor Garnica MD, personally performed the services described in this documentation, as scribed by Jamilah Henderson, in my presence, and it is both accurate and complete.          Clinical Impression       ICD-10-CM ICD-9-CM   1. Acute bronchitis, unspecified organism J20.9 466.0   2. Cough R05 786.2   3. Chronic obstructive pulmonary disease, unspecified COPD type J44.9 496       Disposition:   Disposition: Discharged  Condition: Stable         Nicanor Garnica MD  11/20/17 0996

## 2018-02-06 ENCOUNTER — HOSPITAL ENCOUNTER (EMERGENCY)
Facility: HOSPITAL | Age: 53
Discharge: PSYCHIATRIC OR SUBSTANCE USE FACILITY | End: 2018-02-06
Attending: EMERGENCY MEDICINE
Payer: MEDICARE

## 2018-02-06 VITALS
DIASTOLIC BLOOD PRESSURE: 47 MMHG | SYSTOLIC BLOOD PRESSURE: 102 MMHG | HEART RATE: 80 BPM | TEMPERATURE: 98 F | RESPIRATION RATE: 16 BRPM | HEIGHT: 63 IN | WEIGHT: 155 LBS | OXYGEN SATURATION: 95 % | BODY MASS INDEX: 27.46 KG/M2

## 2018-02-06 DIAGNOSIS — F31.9 BIPOLAR 1 DISORDER: ICD-10-CM

## 2018-02-06 DIAGNOSIS — R06.02 SHORTNESS OF BREATH: ICD-10-CM

## 2018-02-06 DIAGNOSIS — F32.A DEPRESSION, UNSPECIFIED DEPRESSION TYPE: Primary | ICD-10-CM

## 2018-02-06 DIAGNOSIS — F19.10 DRUG ABUSE: ICD-10-CM

## 2018-02-06 LAB
ALBUMIN SERPL BCP-MCNC: 3.2 G/DL
ALP SERPL-CCNC: 51 U/L
ALT SERPL W/O P-5'-P-CCNC: 12 U/L
AMPHET+METHAMPHET UR QL: NORMAL
ANION GAP SERPL CALC-SCNC: 9 MMOL/L
APAP SERPL-MCNC: <3 UG/ML
AST SERPL-CCNC: 25 U/L
BARBITURATES UR QL SCN>200 NG/ML: NEGATIVE
BASOPHILS # BLD AUTO: 0.02 K/UL
BASOPHILS NFR BLD: 0.5 %
BENZODIAZ UR QL SCN>200 NG/ML: NORMAL
BILIRUB SERPL-MCNC: 0.3 MG/DL
BILIRUB UR QL STRIP: ABNORMAL
BUN SERPL-MCNC: 11 MG/DL
BZE UR QL SCN: NORMAL
CALCIUM SERPL-MCNC: 8.3 MG/DL
CANNABINOIDS UR QL SCN: NORMAL
CHLORIDE SERPL-SCNC: 111 MMOL/L
CLARITY UR: CLEAR
CO2 SERPL-SCNC: 23 MMOL/L
COLOR UR: YELLOW
CREAT SERPL-MCNC: 0.8 MG/DL
CREAT UR-MCNC: 228.7 MG/DL
DIFFERENTIAL METHOD: ABNORMAL
EOSINOPHIL # BLD AUTO: 0.1 K/UL
EOSINOPHIL NFR BLD: 1.3 %
ERYTHROCYTE [DISTWIDTH] IN BLOOD BY AUTOMATED COUNT: 18.1 %
EST. GFR  (AFRICAN AMERICAN): >60 ML/MIN/1.73 M^2
EST. GFR  (NON AFRICAN AMERICAN): >60 ML/MIN/1.73 M^2
ETHANOL SERPL-MCNC: <10 MG/DL
GLUCOSE SERPL-MCNC: 103 MG/DL
GLUCOSE UR QL STRIP: ABNORMAL
HCT VFR BLD AUTO: 36.2 %
HGB BLD-MCNC: 11.8 G/DL
HGB UR QL STRIP: NEGATIVE
KETONES UR QL STRIP: NEGATIVE
LEUKOCYTE ESTERASE UR QL STRIP: NEGATIVE
LITHIUM SERPL-SCNC: <0.1 MMOL/L
LYMPHOCYTES # BLD AUTO: 1.3 K/UL
LYMPHOCYTES NFR BLD: 33.7 %
MCH RBC QN AUTO: 29.8 PG
MCHC RBC AUTO-ENTMCNC: 32.6 G/DL
MCV RBC AUTO: 91 FL
METHADONE UR QL SCN>300 NG/ML: NEGATIVE
MONOCYTES # BLD AUTO: 0.3 K/UL
MONOCYTES NFR BLD: 8.4 %
NEUTROPHILS # BLD AUTO: 2.1 K/UL
NEUTROPHILS NFR BLD: 56.1 %
NITRITE UR QL STRIP: NEGATIVE
OPIATES UR QL SCN: NEGATIVE
PCP UR QL SCN>25 NG/ML: NEGATIVE
PH UR STRIP: 5 [PH] (ref 5–8)
PLATELET # BLD AUTO: 296 K/UL
PMV BLD AUTO: 9.6 FL
POTASSIUM SERPL-SCNC: 3.9 MMOL/L
PROT SERPL-MCNC: 6.2 G/DL
PROT UR QL STRIP: NEGATIVE
RBC # BLD AUTO: 3.96 M/UL
SODIUM SERPL-SCNC: 143 MMOL/L
SP GR UR STRIP: >=1.03 (ref 1–1.03)
TOXICOLOGY INFORMATION: NORMAL
TSH SERPL DL<=0.005 MIU/L-ACNC: 1.42 UIU/ML
URN SPEC COLLECT METH UR: ABNORMAL
UROBILINOGEN UR STRIP-ACNC: NEGATIVE EU/DL
VALPROATE SERPL-MCNC: <12.5 UG/ML
WBC # BLD AUTO: 3.8 K/UL

## 2018-02-06 PROCEDURE — 63600175 PHARM REV CODE 636 W HCPCS: Performed by: EMERGENCY MEDICINE

## 2018-02-06 PROCEDURE — 96365 THER/PROPH/DIAG IV INF INIT: CPT

## 2018-02-06 PROCEDURE — 80053 COMPREHEN METABOLIC PANEL: CPT

## 2018-02-06 PROCEDURE — 80178 ASSAY OF LITHIUM: CPT

## 2018-02-06 PROCEDURE — 81003 URINALYSIS AUTO W/O SCOPE: CPT | Mod: 59

## 2018-02-06 PROCEDURE — 80320 DRUG SCREEN QUANTALCOHOLS: CPT

## 2018-02-06 PROCEDURE — 25000242 PHARM REV CODE 250 ALT 637 W/ HCPCS: Performed by: EMERGENCY MEDICINE

## 2018-02-06 PROCEDURE — 94640 AIRWAY INHALATION TREATMENT: CPT

## 2018-02-06 PROCEDURE — G0425 INPT/ED TELECONSULT30: HCPCS | Mod: GT,,,

## 2018-02-06 PROCEDURE — 99285 EMERGENCY DEPT VISIT HI MDM: CPT | Mod: 25

## 2018-02-06 PROCEDURE — 80329 ANALGESICS NON-OPIOID 1 OR 2: CPT

## 2018-02-06 PROCEDURE — 80307 DRUG TEST PRSMV CHEM ANLYZR: CPT

## 2018-02-06 PROCEDURE — 85025 COMPLETE CBC W/AUTO DIFF WBC: CPT

## 2018-02-06 PROCEDURE — 80164 ASSAY DIPROPYLACETIC ACD TOT: CPT

## 2018-02-06 PROCEDURE — 25000003 PHARM REV CODE 250: Performed by: EMERGENCY MEDICINE

## 2018-02-06 PROCEDURE — 84443 ASSAY THYROID STIM HORMONE: CPT

## 2018-02-06 RX ORDER — DIVALPROEX SODIUM 500 MG/1
2000 TABLET, DELAYED RELEASE ORAL NIGHTLY
Status: ON HOLD | COMMUNITY
End: 2018-08-21 | Stop reason: HOSPADM

## 2018-02-06 RX ORDER — DIAZEPAM 5 MG/1
5 TABLET ORAL 2 TIMES DAILY
Status: ON HOLD | COMMUNITY
End: 2018-08-21 | Stop reason: HOSPADM

## 2018-02-06 RX ORDER — LITHIUM CARBONATE 300 MG
300 TABLET ORAL 2 TIMES DAILY
Status: ON HOLD | COMMUNITY
End: 2018-08-21 | Stop reason: HOSPADM

## 2018-02-06 RX ORDER — ALBUTEROL SULFATE 2.5 MG/.5ML
2.5 SOLUTION RESPIRATORY (INHALATION)
Status: COMPLETED | OUTPATIENT
Start: 2018-02-06 | End: 2018-02-06

## 2018-02-06 RX ORDER — LITHIUM CARBONATE 300 MG/1
300 CAPSULE ORAL EVERY 8 HOURS
Status: DISCONTINUED | OUTPATIENT
Start: 2018-02-06 | End: 2018-02-06 | Stop reason: HOSPADM

## 2018-02-06 RX ORDER — CYPROHEPTADINE HYDROCHLORIDE 4 MG/1
4 TABLET ORAL 3 TIMES DAILY PRN
Status: ON HOLD | COMMUNITY
End: 2018-08-21 | Stop reason: HOSPADM

## 2018-02-06 RX ORDER — LORAZEPAM 1 MG/1
1 TABLET ORAL
Status: COMPLETED | OUTPATIENT
Start: 2018-02-06 | End: 2018-02-06

## 2018-02-06 RX ORDER — VALPROIC ACID 250 MG/1
1500 CAPSULE, LIQUID FILLED ORAL ONCE
Status: COMPLETED | OUTPATIENT
Start: 2018-02-06 | End: 2018-02-06

## 2018-02-06 RX ORDER — LEVOTHYROXINE SODIUM 75 UG/1
75 TABLET ORAL
Status: DISCONTINUED | OUTPATIENT
Start: 2018-02-07 | End: 2018-02-06 | Stop reason: HOSPADM

## 2018-02-06 RX ORDER — VALPROIC ACID 250 MG/1
500 CAPSULE, LIQUID FILLED ORAL 2 TIMES DAILY
Status: DISCONTINUED | OUTPATIENT
Start: 2018-02-06 | End: 2018-02-06

## 2018-02-06 RX ADMIN — VALPROIC ACID 1500 MG: 250 CAPSULE, LIQUID FILLED ORAL at 03:02

## 2018-02-06 RX ADMIN — LITHIUM CARBONATE 300 MG: 300 CAPSULE, GELATIN COATED ORAL at 03:02

## 2018-02-06 RX ADMIN — ALBUTEROL SULFATE 2.5 MG: 2.5 SOLUTION RESPIRATORY (INHALATION) at 10:02

## 2018-02-06 RX ADMIN — LORAZEPAM 1 MG: 1 TABLET ORAL at 12:02

## 2018-02-06 RX ADMIN — FOLIC ACID: 5 INJECTION, SOLUTION INTRAMUSCULAR; INTRAVENOUS; SUBCUTANEOUS at 03:02

## 2018-02-06 NOTE — ED PROVIDER NOTES
"SCRIBE #1 NOTE: I, Edvin Andre Piyush, am scribing for, and in the presence of, Yuli Gill Do, MD. I have scribed the entire note.      History      Chief Complaint   Patient presents with    Drug Problem     pt states she has been drinking and doing crack for the last 4 days and called 911 due to being concerned about some tremors, pt has no tremors on arrival       Review of patient's allergies indicates:   Allergen Reactions    Methadone Other (See Comments)        HPI   HPI    2018, 9:53 AM   History obtained from the patient and EMS      History of Present Illness: Jo-Ann Wray is a 52 y.o. female patient with a PMHx of schizophrenia, bipolar, who presents to the Emergency Department for psychiatric evaluation. Pt states that she has been using crack cocaine, drinking alcohol and not taking her prescribed medications for the last 4-5 days. Pt states she needs help "getting back on medication". EMS reports pt called EMS complaining of tremors. Sxs are episodic and moderate in severity. There are no mitigating or exacerbating factors noted. Associated sxs include auditory hallucinations. Pt states "I sometimes hear things". Pt denies any fever, N/V/D, SI, HI, CP,  and all other sxs at this time. No further complaints or concerns at this time.     Arrival mode: EMS      PCP: Primary Doctor No       Past Medical History:  Past Medical History:   Diagnosis Date    Anxiety     Bipolar 1 disorder     Hypothyroid        Past Surgical History:  Past Surgical History:   Procedure Laterality Date     SECTION      GASTRIC BYPASS           Family History:  No family history on file.    Social History:  Social History     Social History Main Topics    Smoking status: Current Every Day Smoker     Packs/day: 0.50     Types: Cigarettes    Smokeless tobacco: Never Used    Alcohol use No    Drug use: Yes    Sexual activity: Not on file       ROS   Review of Systems   Unable to perform ROS: " "Psychiatric disorder     Physical Exam      Initial Vitals [02/06/18 0933]   BP Pulse Resp Temp SpO2   115/74 100 16 98.3 °F (36.8 °C) 96 %      MAP       87.67          Physical Exam  Nursing Notes and Vital Signs Reviewed.  Constitutional: Patient is in no acute distress. Well-developed and well-nourished.  Head: Atraumatic. Normocephalic.  Eyes: PERRL. EOM intact. Conjunctivae are not pale. No scleral icterus.  ENT: Mucous membranes are moist. Oropharynx is clear and symmetric.    Neck: Supple. Full ROM. No lymphadenopathy.  Cardiovascular: Regular rate. Regular rhythm. No murmurs, rubs, or gallops. Distal pulses are 2+ and symmetric.  Pulmonary/Chest: No respiratory distress. Clear to auscultation bilaterally. No wheezing or rales.  Abdominal: Soft and non-distended.  There is no tenderness.  No rebound, guarding, or rigidity. Good bowel sounds.  Genitourinary: No CVA tenderness  Musculoskeletal: Moves all extremities. No obvious deformities. No edema. No calf tenderness.  Skin: Warm and dry.  Neurological:  Alert, awake, and appropriate.  Normal speech.  No acute focal neurological deficits are appreciated.  Psychiatric:               Behavior: Depressed              Mood and Affect: flat affect              Thought Process: poor insight and judgement              Suicidal Ideations: Yes              Suicidal Plan: No specific plan to harm self              Homicidal Ideations: No              Hallucinations: auditory    ED Course    Procedures  ED Vital Signs:  Vitals:    02/06/18 0933 02/06/18 1013   BP: 115/74    Pulse: 100 66   Resp: 16 18   Temp: 98.3 °F (36.8 °C)    TempSrc: Oral    SpO2: 96% 97%   Weight: 70.3 kg (155 lb)    Height: 5' 3" (1.6 m)        Abnormal Lab Results:  Labs Reviewed   CBC W/ AUTO DIFFERENTIAL - Abnormal; Notable for the following:        Result Value    WBC 3.80 (*)     RBC 3.96 (*)     Hemoglobin 11.8 (*)     Hematocrit 36.2 (*)     RDW 18.1 (*)     All other components within " normal limits   COMPREHENSIVE METABOLIC PANEL - Abnormal; Notable for the following:     Chloride 111 (*)     Calcium 8.3 (*)     Albumin 3.2 (*)     Alkaline Phosphatase 51 (*)     All other components within normal limits   URINALYSIS - Abnormal; Notable for the following:     Specific Gravity, UA >=1.030 (*)     Glucose, UA 1+ (*)     Bilirubin (UA) 1+ (*)     All other components within normal limits   ACETAMINOPHEN LEVEL - Abnormal; Notable for the following:     Acetaminophen (Tylenol), Serum <3.0 (*)     All other components within normal limits   LITHIUM LEVEL - Abnormal; Notable for the following:     Lithium Lvl <0.1 (*)     All other components within normal limits   VALPROIC ACID - Abnormal; Notable for the following:     Valproic Acid Lvl <12.5 (*)     All other components within normal limits   TSH   DRUG SCREEN PANEL, URINE EMERGENCY   ALCOHOL,MEDICAL (ETHANOL)   VALPROIC ACID        All Lab Results:  Results for orders placed or performed during the hospital encounter of 02/06/18   CBC auto differential   Result Value Ref Range    WBC 3.80 (L) 3.90 - 12.70 K/uL    RBC 3.96 (L) 4.00 - 5.40 M/uL    Hemoglobin 11.8 (L) 12.0 - 16.0 g/dL    Hematocrit 36.2 (L) 37.0 - 48.5 %    MCV 91 82 - 98 fL    MCH 29.8 27.0 - 31.0 pg    MCHC 32.6 32.0 - 36.0 g/dL    RDW 18.1 (H) 11.5 - 14.5 %    Platelets 296 150 - 350 K/uL    MPV 9.6 9.2 - 12.9 fL    Gran # (ANC) 2.1 1.8 - 7.7 K/uL    Lymph # 1.3 1.0 - 4.8 K/uL    Mono # 0.3 0.3 - 1.0 K/uL    Eos # 0.1 0.0 - 0.5 K/uL    Baso # 0.02 0.00 - 0.20 K/uL    Gran% 56.1 38.0 - 73.0 %    Lymph% 33.7 18.0 - 48.0 %    Mono% 8.4 4.0 - 15.0 %    Eosinophil% 1.3 0.0 - 8.0 %    Basophil% 0.5 0.0 - 1.9 %    Differential Method Automated    Comprehensive metabolic panel   Result Value Ref Range    Sodium 143 136 - 145 mmol/L    Potassium 3.9 3.5 - 5.1 mmol/L    Chloride 111 (H) 95 - 110 mmol/L    CO2 23 23 - 29 mmol/L    Glucose 103 70 - 110 mg/dL    BUN, Bld 11 6 - 20 mg/dL     Creatinine 0.8 0.5 - 1.4 mg/dL    Calcium 8.3 (L) 8.7 - 10.5 mg/dL    Total Protein 6.2 6.0 - 8.4 g/dL    Albumin 3.2 (L) 3.5 - 5.2 g/dL    Total Bilirubin 0.3 0.1 - 1.0 mg/dL    Alkaline Phosphatase 51 (L) 55 - 135 U/L    AST 25 10 - 40 U/L    ALT 12 10 - 44 U/L    Anion Gap 9 8 - 16 mmol/L    eGFR if African American >60 >60 mL/min/1.73 m^2    eGFR if non African American >60 >60 mL/min/1.73 m^2   TSH   Result Value Ref Range    TSH 1.422 0.400 - 4.000 uIU/mL   Urinalysis - clean catch   Result Value Ref Range    Specimen UA Urine, Clean Catch     Color, UA Yellow Yellow, Straw, Kristin    Appearance, UA Clear Clear    pH, UA 5.0 5.0 - 8.0    Specific Gravity, UA >=1.030 (A) 1.005 - 1.030    Protein, UA Negative Negative    Glucose, UA 1+ (A) Negative    Ketones, UA Negative Negative    Bilirubin (UA) 1+ (A) Negative    Occult Blood UA Negative Negative    Nitrite, UA Negative Negative    Urobilinogen, UA Negative <2.0 EU/dL    Leukocytes, UA Negative Negative   Drug screen panel, emergency   Result Value Ref Range    Benzodiazepines Presumptive Positive     Methadone metabolites Negative     Cocaine (Metab.) Presumptive Positive     Opiate Scrn, Ur Negative     Barbiturate Screen, Ur Negative     Amphetamine Screen, Ur Presumptive Positive     THC Presumptive Positive     Phencyclidine Negative     Creatinine, Random Ur 228.7 15.0 - 325.0 mg/dL    Toxicology Information SEE COMMENT    Ethanol   Result Value Ref Range    Alcohol, Medical, Serum <10 <10 mg/dL   Acetaminophen level   Result Value Ref Range    Acetaminophen (Tylenol), Serum <3.0 (L) 10.0 - 20.0 ug/mL   Lithium level   Result Value Ref Range    Lithium Lvl <0.1 (L) 0.6 - 1.2 mmol/L   Valproic Acid   Result Value Ref Range    Valproic Acid Lvl <12.5 (L) 50.0 - 100.0 ug/mL         Imaging Results:  Imaging Results          X-Ray Chest PA And Lateral (Final result)  Result time 02/06/18 10:06:31    Final result by SHERRON Bautista Sr., MD (02/06/18  10:06:31)                 Impression:        1. The lungs are clear.  2. There are surgical clips in the abdomen..      Electronically signed by: SHERRON DOBBS MD  Date:     02/06/18  Time:    10:06              Narrative:    Two-view chest x-ray    Clinical History:  Shortness of breath    Finding: Comparison was made to a prior examination performed on 11/18/2017. The size and contour of the heart are normal. The lungs are clear. There is no pneumothorax or pleural effusion. There are surgical clips in abdomen.                                      The Emergency Provider reviewed the vital signs and test results, which are outlined above.    ED Discussion     11:47 AM: Dr. Wray discussed the pt's case with Eloina Turner NP (tele-psych) who recommends PEC pt.    11:47 AM: The PEC hold has been issued by Dr. Wray at this time for hallucinations, SI, substance abuse disorder.    2:43 PM: Pt has been medically cleared by Dr. Wray at this time. Reassessed pt at this time. Pt is resting comfortably and appears in no acute distress. There are no psychiatric services offered at this facility. D/w pt all pertinent ED information and plan to transfer to psychiatric facility for psychiatric treatment. Pt verbalizes understanding. Patient being transferred by [facility] for ongoing personal protection en route. Pt will be transported by personnel trained in CPR and CPI. All questions and complaints have been addressed at this time. Pt condition is stable at this time and is clear to transfer to psychiatric facility at this time.       ED Medication(s):  Medications   sodium chloride 0.9% 1,000 mL with mvi, adult no.4 with vit K 3,300 unit- 150 mcg/10 mL 10 mL, thiamine 100 mg, folic acid 1 mg infusion (not administered)   lithium capsule 300 mg (not administered)   levothyroxine tablet 75 mcg (not administered)   valproic acid capsule 1,500 mg (not administered)   albuterol sulfate nebulizer solution 2.5 mg (2.5 mg Nebulization Given  2/6/18 1013)   LORazepam tablet 1 mg (1 mg Oral Given 2/6/18 1240)       New Prescriptions    No medications on file             Medical Decision Making    Medical Decision Making:   Clinical Tests:   Lab Tests: Reviewed and Ordered  Radiological Study: Reviewed and Ordered           Scribe Attestation:   Scribe #1: I performed the above scribed service and the documentation accurately describes the services I performed. I attest to the accuracy of the note.    Attending:   Physician Attestation Statement for Scribe #1: I, Yuli Gill Do, MD, personally performed the services described in this documentation, as scribed by Edvin Pickett, in my presence, and it is both accurate and complete.          Clinical Impression       ICD-10-CM ICD-9-CM   1. Depression, unspecified depression type F32.9 311   2. Shortness of breath R06.02 786.05   3. Bipolar 1 disorder F31.9 296.7   4. Drug abuse F19.10 305.90       Disposition:   Disposition: Transferred  Condition: Stable         Yuli Gill Do, MD  02/06/18 4956

## 2018-02-06 NOTE — ED NOTES
Spoke with [facility] has accepted the patient to Dr. Cabrera. Call [phone] for report.

## 2018-02-06 NOTE — ED NOTES
Pt states she has been using crack, drinking alcohol, and has not taken her daily medications in 4-5 days.  Pt denies SI, denies HI, reports auditory hallucinations, denies visual hallucinations.

## 2018-02-06 NOTE — ED NOTES
Attempted to call report to [facility] but states there isn't a nurse available to take report. Will call back in 10 minutes.
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
## 2018-07-14 ENCOUNTER — HOSPITAL ENCOUNTER (EMERGENCY)
Facility: HOSPITAL | Age: 53
Discharge: HOME | End: 2018-07-14
Attending: EMERGENCY MEDICINE
Payer: MEDICARE

## 2018-07-14 VITALS
OXYGEN SATURATION: 97 % | TEMPERATURE: 99 F | HEIGHT: 60 IN | HEART RATE: 85 BPM | SYSTOLIC BLOOD PRESSURE: 102 MMHG | RESPIRATION RATE: 17 BRPM | DIASTOLIC BLOOD PRESSURE: 78 MMHG
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
## 2018-07-14 DIAGNOSIS — S99.912A INJURY OF LEFT ANKLE, INITIAL ENCOUNTER: Primary | ICD-10-CM

## 2018-07-14 DIAGNOSIS — M25.572 LEFT ANKLE PAIN: ICD-10-CM

## 2018-07-14 PROCEDURE — 29515 APPLICATION SHORT LEG SPLINT: CPT | Mod: LT

## 2018-07-14 PROCEDURE — 99284 EMERGENCY DEPT VISIT MOD MDM: CPT | Mod: 25

## 2018-07-14 PROCEDURE — 25000003 PHARM REV CODE 250: Performed by: NURSE PRACTITIONER

## 2018-07-14 RX ORDER — QUETIAPINE FUMARATE 200 MG/1
TABLET, FILM COATED ORAL
Status: ON HOLD | COMMUNITY
End: 2018-08-21 | Stop reason: HOSPADM

## 2018-07-14 RX ORDER — ALBUTEROL SULFATE 90 UG/1
2 AEROSOL, METERED RESPIRATORY (INHALATION) EVERY 6 HOURS PRN
Status: ON HOLD | COMMUNITY
End: 2019-03-18 | Stop reason: HOSPADM

## 2018-07-14 RX ORDER — TRAMADOL HYDROCHLORIDE 50 MG/1
50 TABLET ORAL
Status: COMPLETED | OUTPATIENT
Start: 2018-07-14 | End: 2018-07-14

## 2018-07-14 RX ORDER — MELOXICAM 7.5 MG/1
7.5 TABLET ORAL DAILY
Status: ON HOLD | COMMUNITY
End: 2018-10-20

## 2018-07-14 RX ORDER — CLONAZEPAM 0.5 MG/1
0.5 TABLET ORAL 2 TIMES DAILY PRN
Status: ON HOLD | COMMUNITY
End: 2018-08-21 | Stop reason: HOSPADM

## 2018-07-14 RX ORDER — DICLOFENAC SODIUM 50 MG/1
50 TABLET, DELAYED RELEASE ORAL 3 TIMES DAILY PRN
Qty: 20 TABLET | Refills: 0 | Status: ON HOLD | OUTPATIENT
Start: 2018-07-14 | End: 2018-08-21 | Stop reason: HOSPADM

## 2018-07-14 RX ADMIN — TRAMADOL HYDROCHLORIDE 50 MG: 50 TABLET, COATED ORAL at 02:07

## 2018-07-16 NOTE — ED PROVIDER NOTES
"     HISTORY     Chief Complaint   Patient presents with    Ankle Injury     states fell down steps and felt left ankle pop.  Pt in splint from EMS. Strong pedal pulse noted - able to move toes. C/O pain to outer ankle     Review of patient's allergies indicates:   Allergen Reactions    Methadone Other (See Comments)     "I'm not myself."        HPI   The history is provided by the patient.   Fall   The accident occurred 2 to 3 hours ago. The fall occurred while walking. She landed on concrete. There was no blood loss. Pain location: left ankle  She was ambulatory at the scene. There was no entrapment after the fall. Pertinent negatives include no back pain, no fever and no nausea. The symptoms are aggravated by pressure on the injury, use of the injured limb, activity and ambulation. Prehospitalization: splint  She has tried nothing for the symptoms.        PCP: Primary Doctor No     Past Medical History:  Past Medical History:   Diagnosis Date    Anxiety     Bipolar 1 disorder     Hypothyroid         Past Surgical History:  Past Surgical History:   Procedure Laterality Date     SECTION      GASTRIC BYPASS          Family History:  No family history on file.     Social History:  Social History     Social History Main Topics    Smoking status: Current Every Day Smoker     Packs/day: 0.50     Types: Cigarettes    Smokeless tobacco: Never Used    Alcohol use No    Drug use: Yes    Sexual activity: Not on file         ROS   Review of Systems   Constitutional: Negative for fever.   HENT: Negative for sore throat.    Respiratory: Negative for shortness of breath.    Cardiovascular: Negative for chest pain.   Gastrointestinal: Negative for nausea.   Genitourinary: Negative for dysuria.   Musculoskeletal: Negative for back pain.        Left ankle injury   Skin: Negative for rash.   Neurological: Negative for weakness.   Hematological: Does not bruise/bleed easily.       PHYSICAL EXAM     Initial Vitals " [07/14/18 0037]   BP Pulse Resp Temp SpO2   103/65 80 20 98.4 °F (36.9 °C) 95 %      MAP       --           Physical Exam    Constitutional: She appears well-developed and well-nourished. No distress.   HENT:   Head: Normocephalic and atraumatic.   Eyes: Conjunctivae are normal. Pupils are equal, round, and reactive to light.   Neck: Normal range of motion. Neck supple.   Cardiovascular: Normal rate, regular rhythm and normal heart sounds.   Pulmonary/Chest: Breath sounds normal.   Abdominal: Soft. Bowel sounds are normal. She exhibits no distension. There is no tenderness. There is no rebound.   Musculoskeletal: Normal range of motion. She exhibits no edema.        Left ankle: She exhibits swelling. Tenderness. Lateral malleolus tenderness found. Achilles tendon normal. Achilles tendon exhibits normal Prakash's test results.   Neurological: She is alert and oriented to person, place, and time. She has normal strength.   Skin: Skin is warm and dry.   Psychiatric: She has a normal mood and affect.          ED COURSE   Orthopedic Injury  Date/Time: 7/15/2018 9:59 PM  Performed by: MICHEL MARIA  Authorized by: MICHEL MARIA     Injury:     Injury location:  Ankle    Location details:  Left ankle    Injury type:  Fracture          Selections made in this section will also lock the Injury type section above.:     Manipulation performed?: No      Immobilization:  Splint    Splint type:  Short leg    Supplies used:  Elastic bandage    Complications: No    Post-procedure assessment:     Neurovascular status: Neurovascularly intact      Patient tolerance:  Patient tolerated the procedure well with no immediate complications      ED ONGOING VITALS:  Vitals:    07/14/18 0037 07/14/18 0212 07/14/18 0252   BP: 103/65 115/79 102/78   Pulse: 80 82 85   Resp: 20 19 17   Temp: 98.4 °F (36.9 °C) 98.5 °F (36.9 °C)    TempSrc: Oral     SpO2: 95% 97% 97%   Height: 5' (1.524 m)           ABNORMAL LAB VALUES:  Labs Reviewed - No data  to display      ALL LAB VALUES:        RADIOLOGY STUDIES:  Imaging Results          X-Ray Ankle Complete Left (Final result)  Result time 07/14/18 07:24:45    Final result by Eloy Sykes MD (07/14/18 07:24:45)                 Impression:      Possible chip fracture of the superior surface of the talus.  Midfoot degenerative changes.  Calcaneal bone spurs.      Electronically signed by: Eloy Sykes MD  Date:    07/14/2018  Time:    07:24             Narrative:    EXAMINATION:  XR ANKLE COMPLETE 3 VIEW LEFT    CLINICAL HISTORY:  Pain in left ankle and joints of left foot    TECHNIQUE:  AP, lateral and oblique views of the left ankle were performed.    COMPARISON:  None    FINDINGS:  Midfoot degenerative changes.  Calcaneal bone spur.  Possible subtle fracture of the superior aspect of the talus.                                          The above vital signs and test results have been reviewed by the emergency provider.     ED Medications:  Medications   traMADol tablet 50 mg (50 mg Oral Given 7/14/18 0229)       Discharge Medications:  Discharge Medication List as of 7/14/2018  2:22 AM      START taking these medications    Details   diclofenac (VOLTAREN) 50 MG EC tablet Take 1 tablet (50 mg total) by mouth 3 (three) times daily as needed., Starting Sat 7/14/2018, Print            Follow-up Information     [facility]    Specialty:  Emergency Medicine  Why:  As needed, If symptoms worsen  Contact information:  [address] [facility] [address] [phone]           PCP. Schedule an appointment as soon as possible for a visit in 2 days.                I discussed with patient and/or family/caretaker that evaluation in the ED does not suggest any emergent or life threatening medical conditions requiring immediate intervention beyond what was provided in the ED, and I believe patient is safe for discharge. Regardless, an unremarkable evaluation in the ED does not preclude  the development or presence of a serious or life threatening condition. As such, patient was instructed to return immediately for any worsening or change in current symptoms.        MEDICAL DECISION MAKING                 CLINICAL IMPRESSION       ICD-10-CM ICD-9-CM   1. Injury of left ankle, initial encounter S99.912A 959.7   2. Left ankle pain M25.572 719.47   3. Fall, initial encounter W19.XXXA E888.9       Disposition:   Disposition: Discharged  Condition: Stable         Talha Grant NP  07/15/18 0448

## 2018-08-12 ENCOUNTER — HOSPITAL ENCOUNTER (EMERGENCY)
Facility: HOSPITAL | Age: 53
Discharge: PSYCHIATRIC OR SUBSTANCE USE FACILITY | End: 2018-08-12
Attending: EMERGENCY MEDICINE
Payer: MEDICARE

## 2018-08-12 VITALS
TEMPERATURE: 99 F | SYSTOLIC BLOOD PRESSURE: 118 MMHG | HEART RATE: 92 BPM | RESPIRATION RATE: 18 BRPM | DIASTOLIC BLOOD PRESSURE: 70 MMHG | OXYGEN SATURATION: 96 %

## 2018-08-12 DIAGNOSIS — F14.10 COCAINE ABUSE: ICD-10-CM

## 2018-08-12 PROBLEM — D75.839 THROMBOCYTOSIS: Status: ACTIVE | Noted: 2018-08-12

## 2018-08-12 PROBLEM — E66.811 CLASS 1 OBESITY IN ADULT: Status: ACTIVE | Noted: 2018-08-12

## 2018-08-12 PROBLEM — J44.9 COPD (CHRONIC OBSTRUCTIVE PULMONARY DISEASE): Status: ACTIVE | Noted: 2018-08-12

## 2018-08-12 PROBLEM — F14.20: Status: ACTIVE | Noted: 2018-08-12

## 2018-08-12 PROBLEM — K21.9 GASTROESOPHAGEAL REFLUX DISEASE WITHOUT ESOPHAGITIS: Status: ACTIVE | Noted: 2018-08-12

## 2018-08-12 PROBLEM — E66.9 CLASS 1 OBESITY IN ADULT: Status: ACTIVE | Noted: 2018-08-12

## 2018-08-12 PROBLEM — E03.9 HYPOTHYROIDISM: Status: ACTIVE | Noted: 2018-08-12

## 2018-08-12 PROBLEM — G62.9 NEUROPATHY: Status: ACTIVE | Noted: 2018-08-12

## 2018-08-12 PROBLEM — F31.30 BIPOLAR DISORDER CURRENT EPISODE DEPRESSED: Status: ACTIVE | Noted: 2018-08-12

## 2018-08-12 PROBLEM — D64.9 ANEMIA: Status: ACTIVE | Noted: 2018-08-12

## 2018-08-12 PROBLEM — J45.20 MILD INTERMITTENT ASTHMA WITHOUT COMPLICATION: Status: ACTIVE | Noted: 2018-08-12

## 2018-08-12 LAB
ALBUMIN SERPL BCP-MCNC: 3.8 G/DL
ALP SERPL-CCNC: 64 U/L
ALT SERPL W/O P-5'-P-CCNC: 12 U/L
AMPHET+METHAMPHET UR QL: NEGATIVE
ANION GAP SERPL CALC-SCNC: 11 MMOL/L
APAP SERPL-MCNC: <3 UG/ML
AST SERPL-CCNC: 19 U/L
BARBITURATES UR QL SCN>200 NG/ML: NEGATIVE
BASOPHILS # BLD AUTO: 0.02 K/UL
BASOPHILS NFR BLD: 0.2 %
BENZODIAZ UR QL SCN>200 NG/ML: NEGATIVE
BILIRUB SERPL-MCNC: 0.4 MG/DL
BILIRUB UR QL STRIP: ABNORMAL
BUN SERPL-MCNC: 13 MG/DL
BZE UR QL SCN: NORMAL
CALCIUM SERPL-MCNC: 9.2 MG/DL
CANNABINOIDS UR QL SCN: NEGATIVE
CHLORIDE SERPL-SCNC: 109 MMOL/L
CLARITY UR: CLEAR
CO2 SERPL-SCNC: 23 MMOL/L
COLOR UR: YELLOW
CREAT SERPL-MCNC: 0.7 MG/DL
CREAT UR-MCNC: 265.6 MG/DL
DIFFERENTIAL METHOD: ABNORMAL
EOSINOPHIL # BLD AUTO: 0 K/UL
EOSINOPHIL NFR BLD: 0.2 %
ERYTHROCYTE [DISTWIDTH] IN BLOOD BY AUTOMATED COUNT: 19 %
EST. GFR  (AFRICAN AMERICAN): >60 ML/MIN/1.73 M^2
EST. GFR  (NON AFRICAN AMERICAN): >60 ML/MIN/1.73 M^2
ETHANOL SERPL-MCNC: <10 MG/DL
GLUCOSE SERPL-MCNC: 96 MG/DL
GLUCOSE UR QL STRIP: NEGATIVE
HCT VFR BLD AUTO: 32.5 %
HGB BLD-MCNC: 10.2 G/DL
HGB UR QL STRIP: ABNORMAL
KETONES UR QL STRIP: NEGATIVE
LEUKOCYTE ESTERASE UR QL STRIP: NEGATIVE
LYMPHOCYTES # BLD AUTO: 2.2 K/UL
LYMPHOCYTES NFR BLD: 26.2 %
MCH RBC QN AUTO: 25 PG
MCHC RBC AUTO-ENTMCNC: 31.4 G/DL
MCV RBC AUTO: 80 FL
METHADONE UR QL SCN>300 NG/ML: NEGATIVE
MONOCYTES # BLD AUTO: 0.6 K/UL
MONOCYTES NFR BLD: 7.3 %
NEUTROPHILS # BLD AUTO: 5.4 K/UL
NEUTROPHILS NFR BLD: 66.1 %
NITRITE UR QL STRIP: NEGATIVE
OPIATES UR QL SCN: NEGATIVE
PCP UR QL SCN>25 NG/ML: NEGATIVE
PH UR STRIP: 6 [PH] (ref 5–8)
PLATELET # BLD AUTO: 411 K/UL
PMV BLD AUTO: 8.6 FL
POTASSIUM SERPL-SCNC: 4 MMOL/L
PROT SERPL-MCNC: 7 G/DL
PROT UR QL STRIP: ABNORMAL
RBC # BLD AUTO: 4.08 M/UL
SALICYLATES SERPL-MCNC: <5 MG/DL
SODIUM SERPL-SCNC: 143 MMOL/L
SP GR UR STRIP: >=1.03 (ref 1–1.03)
TOXICOLOGY INFORMATION: NORMAL
TSH SERPL DL<=0.005 MIU/L-ACNC: 0.66 UIU/ML
URN SPEC COLLECT METH UR: ABNORMAL
UROBILINOGEN UR STRIP-ACNC: 1 EU/DL
WBC # BLD AUTO: 8.24 K/UL

## 2018-08-12 PROCEDURE — S4991 NICOTINE PATCH NONLEGEND: HCPCS | Performed by: EMERGENCY MEDICINE

## 2018-08-12 PROCEDURE — 80053 COMPREHEN METABOLIC PANEL: CPT

## 2018-08-12 PROCEDURE — 80307 DRUG TEST PRSMV CHEM ANLYZR: CPT

## 2018-08-12 PROCEDURE — 85025 COMPLETE CBC W/AUTO DIFF WBC: CPT

## 2018-08-12 PROCEDURE — 93005 ELECTROCARDIOGRAM TRACING: CPT

## 2018-08-12 PROCEDURE — 93010 ELECTROCARDIOGRAM REPORT: CPT | Mod: ,,, | Performed by: INTERNAL MEDICINE

## 2018-08-12 PROCEDURE — 80320 DRUG SCREEN QUANTALCOHOLS: CPT

## 2018-08-12 PROCEDURE — 25000003 PHARM REV CODE 250: Performed by: EMERGENCY MEDICINE

## 2018-08-12 PROCEDURE — 84443 ASSAY THYROID STIM HORMONE: CPT

## 2018-08-12 PROCEDURE — 80329 ANALGESICS NON-OPIOID 1 OR 2: CPT

## 2018-08-12 PROCEDURE — 81003 URINALYSIS AUTO W/O SCOPE: CPT | Mod: 59

## 2018-08-12 PROCEDURE — 99285 EMERGENCY DEPT VISIT HI MDM: CPT | Mod: 25

## 2018-08-12 RX ORDER — IBUPROFEN 200 MG
1 TABLET ORAL DAILY
Status: DISCONTINUED | OUTPATIENT
Start: 2018-08-12 | End: 2018-08-12 | Stop reason: SDUPTHER

## 2018-08-12 RX ADMIN — NICOTINE 1 PATCH: 14 PATCH, EXTENDED RELEASE TRANSDERMAL at 11:08

## 2018-08-12 NOTE — ED NOTES
Admit packet has been faxed to [facility], [facility], [facility] and [facility]. Awaiting response.

## 2018-08-12 NOTE — ED NOTES
Medication Rexulti 2mg ( 1 bottle ) , gabapentine 300mg  (1 bottle ) , Synthroid 0.075mcg (1 bottle), clonazepam 0.5mg (1 empty bottle) , Seroquel 200mg ( 1 bottle) . oxcarbezepine 300mg (1bottle), prolozec 40mg (1 bottle)  Secured locker 28

## 2018-08-12 NOTE — ED NOTES
Per James in admissions, patient has been accepted into [facility] ( [address] [place] [address]) under the care of Dr. Marr. Call report to [phone].
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
## 2018-08-12 NOTE — ED NOTES
1 pair crocs  2 bras  4 socks   8 shirts   3 boxers   1 pair jeans  2 sweats  4 underwear  2 pairs flip flops  Conditioner  Comb  Soap   Wash cloth  Wallet  Candy   Geff pouch  2 inhalers  Perfume  2 lotions  Card deck  Lighter  Lip gloss  Chap stick  Pen   Mail   Pt belongings secured in locker 28.

## 2018-08-12 NOTE — ED NOTES
Pt states she hasn't taken her meds in a couple of days. Her suicidal plan was to kill herself by using crack. Pt reports her fiance called EMS to have her brought in, pt wishes to seek help.

## 2018-10-04 ENCOUNTER — HOSPITAL ENCOUNTER (EMERGENCY)
Facility: HOSPITAL | Age: 53
Discharge: PSYCHIATRIC OR SUBSTANCE USE FACILITY | End: 2018-10-04
Attending: EMERGENCY MEDICINE
Payer: MEDICARE

## 2018-10-04 VITALS
BODY MASS INDEX: 32.03 KG/M2 | SYSTOLIC BLOOD PRESSURE: 118 MMHG | RESPIRATION RATE: 18 BRPM | TEMPERATURE: 98 F | HEART RATE: 88 BPM | OXYGEN SATURATION: 97 % | DIASTOLIC BLOOD PRESSURE: 67 MMHG | WEIGHT: 164 LBS

## 2018-10-04 DIAGNOSIS — F29 PSYCHOSIS, UNSPECIFIED PSYCHOSIS TYPE: ICD-10-CM

## 2018-10-04 DIAGNOSIS — F14.10 COCAINE ABUSE: ICD-10-CM

## 2018-10-04 DIAGNOSIS — R44.3 HALLUCINATIONS: Primary | ICD-10-CM

## 2018-10-04 DIAGNOSIS — F10.251: ICD-10-CM

## 2018-10-04 PROBLEM — F31.9 BIPOLAR 1 DISORDER, DEPRESSED: Status: ACTIVE | Noted: 2018-10-04

## 2018-10-04 LAB
ALBUMIN SERPL BCP-MCNC: 3.8 G/DL
ALP SERPL-CCNC: 54 U/L
ALT SERPL W/O P-5'-P-CCNC: 11 U/L
AMPHET+METHAMPHET UR QL: NEGATIVE
ANION GAP SERPL CALC-SCNC: 8 MMOL/L
APAP SERPL-MCNC: <3 UG/ML
AST SERPL-CCNC: 20 U/L
B-HCG UR QL: NEGATIVE
BARBITURATES UR QL SCN>200 NG/ML: NEGATIVE
BASOPHILS # BLD AUTO: 0.03 K/UL
BASOPHILS NFR BLD: 0.3 %
BENZODIAZ UR QL SCN>200 NG/ML: NEGATIVE
BILIRUB SERPL-MCNC: 0.2 MG/DL
BILIRUB UR QL STRIP: NEGATIVE
BUN SERPL-MCNC: 8 MG/DL
BZE UR QL SCN: NORMAL
CALCIUM SERPL-MCNC: 9 MG/DL
CANNABINOIDS UR QL SCN: NEGATIVE
CHLORIDE SERPL-SCNC: 107 MMOL/L
CLARITY UR: CLEAR
CO2 SERPL-SCNC: 22 MMOL/L
COLOR UR: YELLOW
CREAT SERPL-MCNC: 0.7 MG/DL
CREAT UR-MCNC: 146.9 MG/DL
DIFFERENTIAL METHOD: ABNORMAL
EOSINOPHIL # BLD AUTO: 0 K/UL
EOSINOPHIL NFR BLD: 0.3 %
ERYTHROCYTE [DISTWIDTH] IN BLOOD BY AUTOMATED COUNT: 17.3 %
EST. GFR  (AFRICAN AMERICAN): >60 ML/MIN/1.73 M^2
EST. GFR  (NON AFRICAN AMERICAN): >60 ML/MIN/1.73 M^2
ETHANOL SERPL-MCNC: <10 MG/DL
GLUCOSE SERPL-MCNC: 94 MG/DL
GLUCOSE UR QL STRIP: NEGATIVE
HCT VFR BLD AUTO: 32.3 %
HGB BLD-MCNC: 10 G/DL
HGB UR QL STRIP: NEGATIVE
KETONES UR QL STRIP: NEGATIVE
LEUKOCYTE ESTERASE UR QL STRIP: NEGATIVE
LYMPHOCYTES # BLD AUTO: 2.6 K/UL
LYMPHOCYTES NFR BLD: 25.1 %
MCH RBC QN AUTO: 23.9 PG
MCHC RBC AUTO-ENTMCNC: 31 G/DL
MCV RBC AUTO: 77 FL
METHADONE UR QL SCN>300 NG/ML: NEGATIVE
MONOCYTES # BLD AUTO: 0.8 K/UL
MONOCYTES NFR BLD: 7.7 %
NEUTROPHILS # BLD AUTO: 6.9 K/UL
NEUTROPHILS NFR BLD: 66.6 %
NITRITE UR QL STRIP: NEGATIVE
OPIATES UR QL SCN: NEGATIVE
PCP UR QL SCN>25 NG/ML: NEGATIVE
PH UR STRIP: 5 [PH] (ref 5–8)
PLATELET # BLD AUTO: 435 K/UL
PMV BLD AUTO: 9.1 FL
POTASSIUM SERPL-SCNC: 3.5 MMOL/L
PROT SERPL-MCNC: 6.9 G/DL
PROT UR QL STRIP: NEGATIVE
RBC # BLD AUTO: 4.18 M/UL
SODIUM SERPL-SCNC: 137 MMOL/L
SP GR UR STRIP: 1.02 (ref 1–1.03)
TOXICOLOGY INFORMATION: NORMAL
TSH SERPL DL<=0.005 MIU/L-ACNC: 0.78 UIU/ML
URN SPEC COLLECT METH UR: NORMAL
UROBILINOGEN UR STRIP-ACNC: NEGATIVE EU/DL
WBC # BLD AUTO: 10.28 K/UL

## 2018-10-04 PROCEDURE — 81003 URINALYSIS AUTO W/O SCOPE: CPT | Mod: 59

## 2018-10-04 PROCEDURE — 99285 EMERGENCY DEPT VISIT HI MDM: CPT | Mod: 25

## 2018-10-04 PROCEDURE — 25000003 PHARM REV CODE 250: Performed by: EMERGENCY MEDICINE

## 2018-10-04 PROCEDURE — S4991 NICOTINE PATCH NONLEGEND: HCPCS | Performed by: EMERGENCY MEDICINE

## 2018-10-04 PROCEDURE — 84443 ASSAY THYROID STIM HORMONE: CPT

## 2018-10-04 PROCEDURE — 94640 AIRWAY INHALATION TREATMENT: CPT

## 2018-10-04 PROCEDURE — 81025 URINE PREGNANCY TEST: CPT

## 2018-10-04 PROCEDURE — 80329 ANALGESICS NON-OPIOID 1 OR 2: CPT

## 2018-10-04 PROCEDURE — 63600175 PHARM REV CODE 636 W HCPCS: Performed by: EMERGENCY MEDICINE

## 2018-10-04 PROCEDURE — 25000242 PHARM REV CODE 250 ALT 637 W/ HCPCS: Performed by: EMERGENCY MEDICINE

## 2018-10-04 PROCEDURE — 27000221 HC OXYGEN, UP TO 24 HOURS

## 2018-10-04 PROCEDURE — 80053 COMPREHEN METABOLIC PANEL: CPT

## 2018-10-04 PROCEDURE — G0426 INPT/ED TELECONSULT50: HCPCS | Mod: GT,,, | Performed by: PSYCHIATRY & NEUROLOGY

## 2018-10-04 PROCEDURE — 80307 DRUG TEST PRSMV CHEM ANLYZR: CPT

## 2018-10-04 PROCEDURE — 80320 DRUG SCREEN QUANTALCOHOLS: CPT

## 2018-10-04 PROCEDURE — 85025 COMPLETE CBC W/AUTO DIFF WBC: CPT

## 2018-10-04 PROCEDURE — 36415 COLL VENOUS BLD VENIPUNCTURE: CPT

## 2018-10-04 RX ORDER — PROPRANOLOL HYDROCHLORIDE 10 MG/1
10 TABLET ORAL 2 TIMES DAILY
Status: ON HOLD | COMMUNITY
End: 2018-10-20

## 2018-10-04 RX ORDER — IPRATROPIUM BROMIDE AND ALBUTEROL SULFATE 2.5; .5 MG/3ML; MG/3ML
3 SOLUTION RESPIRATORY (INHALATION)
Status: COMPLETED | OUTPATIENT
Start: 2018-10-04 | End: 2018-10-04

## 2018-10-04 RX ORDER — PREDNISONE 20 MG/1
40 TABLET ORAL DAILY
Qty: 8 TABLET | Refills: 0 | Status: ON HOLD | OUTPATIENT
Start: 2018-10-04 | End: 2018-10-12 | Stop reason: HOSPADM

## 2018-10-04 RX ORDER — BUPROPION HYDROCHLORIDE 100 MG/1
100 TABLET ORAL 2 TIMES DAILY
Status: ON HOLD | COMMUNITY
End: 2018-10-12 | Stop reason: HOSPADM

## 2018-10-04 RX ORDER — PREDNISONE 20 MG/1
40 TABLET ORAL
Status: COMPLETED | OUTPATIENT
Start: 2018-10-04 | End: 2018-10-04

## 2018-10-04 RX ORDER — BENZTROPINE MESYLATE 1 MG/1
1 TABLET ORAL DAILY
Status: ON HOLD | COMMUNITY
End: 2018-10-12 | Stop reason: HOSPADM

## 2018-10-04 RX ORDER — IBUPROFEN 200 MG
1 TABLET ORAL
Status: DISCONTINUED | OUTPATIENT
Start: 2018-10-04 | End: 2018-10-04 | Stop reason: HOSPADM

## 2018-10-04 RX ADMIN — NICOTINE 1 PATCH: 21 PATCH, EXTENDED RELEASE TRANSDERMAL at 03:10

## 2018-10-04 RX ADMIN — IPRATROPIUM BROMIDE AND ALBUTEROL SULFATE 3 ML: .5; 3 SOLUTION RESPIRATORY (INHALATION) at 06:10

## 2018-10-04 RX ADMIN — PREDNISONE 40 MG: 20 TABLET ORAL at 06:10

## 2018-10-04 NOTE — ED NOTES
Pt AAOx4, resting in bed, side rails up x 2, call bell within reach. NAD at this time. Will continue to monitor.

## 2018-10-04 NOTE — ED NOTES
Spoke with [facility] to set up Tele Psych - Dipika stated it would be Dr. Chance who would call

## 2018-10-04 NOTE — ED PROVIDER NOTES
"SCRIBE #1 NOTE: I, Savita oWlfe, am scribing for, and in the presence of, Jordan Pedro Jr., MD. I have scribed the entire note.      History      Chief Complaint   Patient presents with    Hallucinations     pt out of psych meds and hearing voices       Review of patient's allergies indicates:   Allergen Reactions    Methadone Other (See Comments)     "I'm not myself."        HPI   HPI    10/4/2018, 11:04 AM   History obtained from the patient      History of Present Illness: Jo-Ann Wray is a 53 y.o. female patient with bipolar 1 disorder, anxiety who presents to the Emergency Department for visual and auditory hallucinations which onset gradually today. She also reports that she is paranoid and thinks that someone is out to get her. Symptoms are constant and moderate in severity. No mitigating or exacerbating factors reported. She states that she is not out of her psych medications and has them with her. Patient denies fever, chills, CP, SOB, abd pain, N/V/D, dysuria, HA, dizziness, extremity weakness/numbness, paresthesias, HI, SI, and all other sxs at this time. She lives with her fiance, who is at home. She has a psychiatric appointment in about 5 days but feels like she needs help now. No further complaints or concerns at this time.       Arrival mode: Personal vehicle    PCP: Primary Doctor No       Past Medical History:  Past Medical History:   Diagnosis Date    Anxiety     Asthma     Bipolar 1 disorder     COPD (chronic obstructive pulmonary disease)     Hypothyroid        Past Surgical History:  Past Surgical History:   Procedure Laterality Date     SECTION      GASTRIC BYPASS           Family History:  Family History   Family history unknown: Yes       Social History:  Social History     Tobacco Use    Smoking status: Current Every Day Smoker     Packs/day: 0.50     Types: Cigarettes    Smokeless tobacco: Never Used   Substance and Sexual Activity    Alcohol use: No    Drug " use: Yes    Sexual activity: Unknown       ROS   Review of Systems   Constitutional: Negative for chills and fever.   HENT: Negative for sore throat.    Respiratory: Negative for shortness of breath.    Cardiovascular: Negative for chest pain.   Gastrointestinal: Negative for abdominal pain, diarrhea, nausea and vomiting.   Genitourinary: Negative for dysuria.   Musculoskeletal: Negative for back pain.   Skin: Negative for rash.   Neurological: Negative for dizziness, seizures, syncope, facial asymmetry, speech difficulty, weakness, light-headedness, numbness and headaches.   Hematological: Does not bruise/bleed easily.   Psychiatric/Behavioral: Positive for hallucinations. Negative for confusion, self-injury and suicidal ideas.        (+) paranoid, (-) HI   All other systems reviewed and are negative.      Physical Exam      Initial Vitals [10/04/18 1102]   BP Pulse Resp Temp SpO2   115/64 81 16 98.5 °F (36.9 °C) 96 %      MAP       --         Physical Exam  Nursing Notes and Vital Signs Reviewed.  Constitutional: Patient is in no acute distress. Awake and alert. Well-developed and well-nourished.  Head: Atraumatic. Normocephalic.  Eyes: PERRL. EOM intact. Conjunctivae are not pale. No scleral icterus.  ENT: Mucous membranes are moist. Oropharynx is clear and symmetric.    Neck: Supple. Full ROM. No lymphadenopathy.  Cardiovascular: Regular rate. Regular rhythm. No murmurs, rubs, or gallops. Distal pulses are 2+ and symmetric.  Pulmonary/Chest: No respiratory distress. Clear to auscultation bilaterally. No wheezing, rales, or rhonchi.  Abdominal: Soft and non-distended.  There is no tenderness.  No rebound, guarding, or rigidity.  Good bowel sounds.   Musculoskeletal: Moves all extremities. No obvious deformities. No edema. No calf tenderness.  Skin: Warm and dry.  Neurological: GCS 15. Alert, awake, and appropriate.  Normal speech.  No acute focal neurological deficits are appreciated.  Psychiatric:                Behavior: normal, cooperative              Mood and Affect: flat affect              Thought Process: within normal limits              Suicidal Ideations: No              Suicidal Plan: No specific plan to harm self              Homicidal Ideations: No              Hallucinations: auditory and visual      ED Course    Procedures  ED Vital Signs:  Vitals:    10/04/18 1102 10/04/18 1109 10/04/18 1121 10/04/18 1201   BP: 115/64 (!) 105/55  (!) 103/56   Pulse: 81 79 80 75   Resp: 16   18   Temp: 98.5 °F (36.9 °C)      TempSrc: Oral      SpO2: 96% 98%  98%   Weight: 74.4 kg (164 lb)       10/04/18 1301   BP: 113/61   Pulse: 76   Resp: 18   Temp:    TempSrc:    SpO2: 98%   Weight:        Abnormal Lab Results:  Labs Reviewed   CBC W/ AUTO DIFFERENTIAL - Abnormal; Notable for the following components:       Result Value    Hemoglobin 10.0 (*)     Hematocrit 32.3 (*)     MCV 77 (*)     MCH 23.9 (*)     MCHC 31.0 (*)     RDW 17.3 (*)     Platelets 435 (*)     MPV 9.1 (*)     All other components within normal limits   COMPREHENSIVE METABOLIC PANEL - Abnormal; Notable for the following components:    CO2 22 (*)     Alkaline Phosphatase 54 (*)     All other components within normal limits   ACETAMINOPHEN LEVEL - Abnormal; Notable for the following components:    Acetaminophen (Tylenol), Serum <3.0 (*)     All other components within normal limits   TSH   URINALYSIS, REFLEX TO URINE CULTURE    Narrative:     Preferred Collection Type->Urine, Clean Catch   DRUG SCREEN PANEL, URINE EMERGENCY    Narrative:     Preferred Collection Type->Urine, Clean Catch   ALCOHOL,MEDICAL (ETHANOL)        All Lab Results:  Results for orders placed or performed during the hospital encounter of 10/04/18   CBC auto differential   Result Value Ref Range    WBC 10.28 3.90 - 12.70 K/uL    RBC 4.18 4.00 - 5.40 M/uL    Hemoglobin 10.0 (L) 12.0 - 16.0 g/dL    Hematocrit 32.3 (L) 37.0 - 48.5 %    MCV 77 (L) 82 - 98 fL    MCH 23.9 (L) 27.0 - 31.0 pg     MCHC 31.0 (L) 32.0 - 36.0 g/dL    RDW 17.3 (H) 11.5 - 14.5 %    Platelets 435 (H) 150 - 350 K/uL    MPV 9.1 (L) 9.2 - 12.9 fL    Gran # (ANC) 6.9 1.8 - 7.7 K/uL    Lymph # 2.6 1.0 - 4.8 K/uL    Mono # 0.8 0.3 - 1.0 K/uL    Eos # 0.0 0.0 - 0.5 K/uL    Baso # 0.03 0.00 - 0.20 K/uL    Gran% 66.6 38.0 - 73.0 %    Lymph% 25.1 18.0 - 48.0 %    Mono% 7.7 4.0 - 15.0 %    Eosinophil% 0.3 0.0 - 8.0 %    Basophil% 0.3 0.0 - 1.9 %    Differential Method Automated    Comprehensive metabolic panel   Result Value Ref Range    Sodium 137 136 - 145 mmol/L    Potassium 3.5 3.5 - 5.1 mmol/L    Chloride 107 95 - 110 mmol/L    CO2 22 (L) 23 - 29 mmol/L    Glucose 94 70 - 110 mg/dL    BUN, Bld 8 6 - 20 mg/dL    Creatinine 0.7 0.5 - 1.4 mg/dL    Calcium 9.0 8.7 - 10.5 mg/dL    Total Protein 6.9 6.0 - 8.4 g/dL    Albumin 3.8 3.5 - 5.2 g/dL    Total Bilirubin 0.2 0.1 - 1.0 mg/dL    Alkaline Phosphatase 54 (L) 55 - 135 U/L    AST 20 10 - 40 U/L    ALT 11 10 - 44 U/L    Anion Gap 8 8 - 16 mmol/L    eGFR if African American >60 >60 mL/min/1.73 m^2    eGFR if non African American >60 >60 mL/min/1.73 m^2   TSH   Result Value Ref Range    TSH 0.777 0.400 - 4.000 uIU/mL   Urinalysis, Reflex to Urine Culture Urine, Clean Catch   Result Value Ref Range    Specimen UA Urine, Clean Catch     Color, UA Yellow Yellow, Straw, Kristin    Appearance, UA Clear Clear    pH, UA 5.0 5.0 - 8.0    Specific Gravity, UA 1.025 1.005 - 1.030    Protein, UA Negative Negative    Glucose, UA Negative Negative    Ketones, UA Negative Negative    Bilirubin (UA) Negative Negative    Occult Blood UA Negative Negative    Nitrite, UA Negative Negative    Urobilinogen, UA Negative <2.0 EU/dL    Leukocytes, UA Negative Negative   Drug screen panel, emergency   Result Value Ref Range    Benzodiazepines Negative     Methadone metabolites Negative     Cocaine (Metab.) Presumptive Positive     Opiate Scrn, Ur Negative     Barbiturate Screen, Ur Negative     Amphetamine Screen, Ur  Negative     THC Negative     Phencyclidine Negative     Creatinine, Random Ur 146.9 15.0 - 325.0 mg/dL    Toxicology Information SEE COMMENT    Ethanol   Result Value Ref Range    Alcohol, Medical, Serum <10 <10 mg/dL   Acetaminophen level   Result Value Ref Range    Acetaminophen (Tylenol), Serum <3.0 (L) 10.0 - 20.0 ug/mL         The Emergency Provider reviewed the vital signs and test results, which are outlined above.    ED Discussion   2:00 PM: Patient has been evaluated by Dr. Adri Chance (Psychiatrist) via Tele-psych who recommends PECing patient.    2:10 PM: The PEC hold has been issued by Dr. Pedro at this time for gravely disabled, paranoid, and hallucinations.    2:12 PM: Pt has been medically cleared by Dr. Pedro at this time. Reassessed pt at this time. Pt is resting comfortably and appears in no acute distress. There are no psychiatric services offered at this facility. D/w pt all pertinent ED information and plan to transfer to psychiatric facility for psychiatric treatment. Pt verbalizes understanding. Patient being transferred by [facility] for ongoing personal protection en route. Pt has been made aware of all risks and benefits associated with transfer, including but not limited to death, MVC, loss of vital signs, and/or permanent disability. Benefits include ability to be treated at an inpatient psychiatric facility. Pt will be transported by personnel trained in CPR and CPI. Patient understands that there could be unforeseen motor vehicle accidents, inclement weather, or loss of vital signs that could result in potential death or permanent disability. All questions and complaints have been addressed at this time. Pt condition is stable at this time and is clear to transfer to psychiatric facility at this time.     ED Medication(s):  Medications - No data to display    Current Discharge Medication List               Medical Decision Making    Medical Decision Making:   Clinical Tests:   Lab Tests:  Reviewed and Ordered           Scribe Attestation:   Scribe #1: I performed the above scribed service and the documentation accurately describes the services I performed. I attest to the accuracy of the note.  10/04/2018 11:04 AM    Attending:   Physician Attestation Statement for Scribe #1: I, Jordan Pedro Jr., MD, personally performed the services described in this documentation, as scribed by Savita Wolfe, in my presence, and it is both accurate and complete.          Clinical Impression       ICD-10-CM ICD-9-CM   1. Hallucinations R44.3 780.1   2. Psychosis, unspecified psychosis type F29 298.9       Disposition:   Disposition: Transferred (to psychiatric facilty)  Condition: Stable         Jordan Pedro Jr., MD  10/04/18 1431

## 2018-10-04 NOTE — ED NOTES
Admit packet faxed to [facility], [facility], [facility], and [facility].

## 2018-10-04 NOTE — ED NOTES
Pt. Stable for DC per MD. PT DC with New home orders. Updated Report to be called to [facility] ([phone]) report given to WILLIE CARRINGTON

## 2018-10-04 NOTE — ED NOTES
Patient accepted by Gina at [facility] ([address] [facility], [place], [place]) for the service of Dr. Kay San.  Report to be called to [phone].

## 2018-10-04 NOTE — ED NOTES
Pt in bed,in grey gown,yellow socks on,room and cabinets secured per hospital protocol,belongings secured,pt directly monitored by Monty booth,will continue to monitor.  Belongings: red/white/blue tote with misc clothing items, black/white striped pants, grey shirt, brown slip on shoes, 1 red bracelet, 1 green bracelet, 1 bag of various medications, pair of green socks, black colored wallet, and tan animal print bag placed in #27 cabinet

## 2018-10-04 NOTE — ED NOTES
Updated Report to be called to [facility] ([phone]) report given to WILLIE CARRINGTON

## 2018-10-04 NOTE — ED NOTES
Report to be called to [facility] ([phone]) report given to WILLIE CARRINGTON
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
## 2018-10-19 ENCOUNTER — HOSPITAL ENCOUNTER (INPATIENT)
Facility: HOSPITAL | Age: 53
LOS: 12 days | Discharge: HOME | DRG: 885 | End: 2018-10-31
Attending: PSYCHIATRY & NEUROLOGY | Admitting: PSYCHIATRY & NEUROLOGY
Payer: MEDICARE

## 2018-10-19 ENCOUNTER — HOSPITAL ENCOUNTER (EMERGENCY)
Facility: HOSPITAL | Age: 53
Discharge: PSYCHIATRIC OR SUBSTANCE USE FACILITY | End: 2018-10-19
Attending: EMERGENCY MEDICINE
Payer: MEDICARE

## 2018-10-19 VITALS
TEMPERATURE: 98 F | HEART RATE: 66 BPM | OXYGEN SATURATION: 99 % | RESPIRATION RATE: 18 BRPM | SYSTOLIC BLOOD PRESSURE: 110 MMHG | DIASTOLIC BLOOD PRESSURE: 54 MMHG

## 2018-10-19 DIAGNOSIS — F14.10 COCAINE ABUSE: ICD-10-CM

## 2018-10-19 DIAGNOSIS — R45.851 SUICIDAL IDEATION: ICD-10-CM

## 2018-10-19 DIAGNOSIS — F32.A DEPRESSION, UNSPECIFIED DEPRESSION TYPE: Primary | ICD-10-CM

## 2018-10-19 DIAGNOSIS — R45.851 DEPRESSION WITH SUICIDAL IDEATION: ICD-10-CM

## 2018-10-19 DIAGNOSIS — F31.9 BIPOLAR 1 DISORDER, DEPRESSED: ICD-10-CM

## 2018-10-19 DIAGNOSIS — R45.850 HOMICIDAL IDEATION: ICD-10-CM

## 2018-10-19 DIAGNOSIS — F39 MOOD DISORDER: Primary | ICD-10-CM

## 2018-10-19 DIAGNOSIS — F32.A DEPRESSION WITH SUICIDAL IDEATION: ICD-10-CM

## 2018-10-19 LAB
ALBUMIN SERPL BCP-MCNC: 3.2 G/DL
ALP SERPL-CCNC: 55 U/L
ALT SERPL W/O P-5'-P-CCNC: 15 U/L
AMPHET+METHAMPHET UR QL: NEGATIVE
ANION GAP SERPL CALC-SCNC: 12 MMOL/L
ANISOCYTOSIS BLD QL SMEAR: SLIGHT
APAP SERPL-MCNC: <3 UG/ML
AST SERPL-CCNC: 17 U/L
BACTERIA #/AREA URNS HPF: NORMAL /HPF
BARBITURATES UR QL SCN>200 NG/ML: NEGATIVE
BASOPHILS # BLD AUTO: 0.02 K/UL
BASOPHILS NFR BLD: 0.3 %
BENZODIAZ UR QL SCN>200 NG/ML: NEGATIVE
BILIRUB SERPL-MCNC: 0.1 MG/DL
BILIRUB UR QL STRIP: NEGATIVE
BUN SERPL-MCNC: 9 MG/DL
BZE UR QL SCN: ABNORMAL
CALCIUM SERPL-MCNC: 8.8 MG/DL
CANNABINOIDS UR QL SCN: NEGATIVE
CHLORIDE SERPL-SCNC: 109 MMOL/L
CLARITY UR: CLEAR
CO2 SERPL-SCNC: 19 MMOL/L
COLOR UR: YELLOW
CREAT SERPL-MCNC: 0.7 MG/DL
CREAT UR-MCNC: 8.1 MG/DL
DIFFERENTIAL METHOD: ABNORMAL
EOSINOPHIL # BLD AUTO: 0.1 K/UL
EOSINOPHIL NFR BLD: 0.8 %
ERYTHROCYTE [DISTWIDTH] IN BLOOD BY AUTOMATED COUNT: 17.6 %
EST. GFR  (AFRICAN AMERICAN): >60 ML/MIN/1.73 M^2
EST. GFR  (NON AFRICAN AMERICAN): >60 ML/MIN/1.73 M^2
ETHANOL SERPL-MCNC: 33 MG/DL
GLUCOSE SERPL-MCNC: 200 MG/DL
GLUCOSE UR QL STRIP: NEGATIVE
HCT VFR BLD AUTO: 30.2 %
HGB BLD-MCNC: 9.3 G/DL
HGB UR QL STRIP: NEGATIVE
KETONES UR QL STRIP: NEGATIVE
LEUKOCYTE ESTERASE UR QL STRIP: ABNORMAL
LYMPHOCYTES # BLD AUTO: 2.5 K/UL
LYMPHOCYTES NFR BLD: 34 %
MCH RBC QN AUTO: 23.8 PG
MCHC RBC AUTO-ENTMCNC: 30.8 G/DL
MCV RBC AUTO: 77 FL
METHADONE UR QL SCN>300 NG/ML: NEGATIVE
MICROSCOPIC COMMENT: NORMAL
MONOCYTES # BLD AUTO: 0.5 K/UL
MONOCYTES NFR BLD: 6.9 %
NEUTROPHILS # BLD AUTO: 4.2 K/UL
NEUTROPHILS NFR BLD: 58.3 %
NITRITE UR QL STRIP: NEGATIVE
OPIATES UR QL SCN: NEGATIVE
PCP UR QL SCN>25 NG/ML: NEGATIVE
PH UR STRIP: 6 [PH] (ref 5–8)
PLATELET # BLD AUTO: 293 K/UL
PMV BLD AUTO: 9.5 FL
POTASSIUM SERPL-SCNC: 3.9 MMOL/L
PROT SERPL-MCNC: 6.1 G/DL
PROT UR QL STRIP: NEGATIVE
RBC # BLD AUTO: 3.9 M/UL
SODIUM SERPL-SCNC: 140 MMOL/L
SP GR UR STRIP: <=1.005 (ref 1–1.03)
SQUAMOUS #/AREA URNS HPF: 5 /HPF
TOXICOLOGY INFORMATION: ABNORMAL
TSH SERPL DL<=0.005 MIU/L-ACNC: 0.81 UIU/ML
URN SPEC COLLECT METH UR: ABNORMAL
UROBILINOGEN UR STRIP-ACNC: NEGATIVE EU/DL
WBC # BLD AUTO: 7.26 K/UL
WBC #/AREA URNS HPF: 5 /HPF (ref 0–5)

## 2018-10-19 PROCEDURE — 99285 EMERGENCY DEPT VISIT HI MDM: CPT

## 2018-10-19 PROCEDURE — 25000003 PHARM REV CODE 250: Performed by: EMERGENCY MEDICINE

## 2018-10-19 PROCEDURE — 80329 ANALGESICS NON-OPIOID 1 OR 2: CPT

## 2018-10-19 PROCEDURE — 85025 COMPLETE CBC W/AUTO DIFF WBC: CPT

## 2018-10-19 PROCEDURE — 80320 DRUG SCREEN QUANTALCOHOLS: CPT

## 2018-10-19 PROCEDURE — 80307 DRUG TEST PRSMV CHEM ANLYZR: CPT

## 2018-10-19 PROCEDURE — 81000 URINALYSIS NONAUTO W/SCOPE: CPT | Mod: 59

## 2018-10-19 PROCEDURE — 11400000 HC PSYCH PRIVATE ROOM

## 2018-10-19 PROCEDURE — 80053 COMPREHEN METABOLIC PANEL: CPT

## 2018-10-19 PROCEDURE — 84443 ASSAY THYROID STIM HORMONE: CPT

## 2018-10-19 RX ORDER — IBUPROFEN 200 MG
1 TABLET ORAL DAILY PRN
Status: DISCONTINUED | OUTPATIENT
Start: 2018-10-19 | End: 2018-10-20

## 2018-10-19 RX ORDER — LOPERAMIDE HYDROCHLORIDE 2 MG/1
2 CAPSULE ORAL
Status: DISCONTINUED | OUTPATIENT
Start: 2018-10-19 | End: 2018-10-31 | Stop reason: HOSPADM

## 2018-10-19 RX ORDER — HYDROXYZINE PAMOATE 25 MG/1
50 CAPSULE ORAL
Status: COMPLETED | OUTPATIENT
Start: 2018-10-19 | End: 2018-10-19

## 2018-10-19 RX ORDER — OLANZAPINE 10 MG/1
10 TABLET ORAL EVERY 8 HOURS PRN
Status: DISCONTINUED | OUTPATIENT
Start: 2018-10-19 | End: 2018-10-31 | Stop reason: HOSPADM

## 2018-10-19 RX ORDER — FOLIC ACID 1 MG/1
1 TABLET ORAL DAILY
Status: DISCONTINUED | OUTPATIENT
Start: 2018-10-20 | End: 2018-10-22

## 2018-10-19 RX ORDER — ACETAMINOPHEN 325 MG/1
650 TABLET ORAL EVERY 6 HOURS PRN
Status: DISCONTINUED | OUTPATIENT
Start: 2018-10-19 | End: 2018-10-31 | Stop reason: HOSPADM

## 2018-10-19 RX ORDER — DOCUSATE SODIUM 100 MG/1
100 CAPSULE, LIQUID FILLED ORAL DAILY PRN
Status: DISCONTINUED | OUTPATIENT
Start: 2018-10-19 | End: 2018-10-31 | Stop reason: HOSPADM

## 2018-10-19 RX ORDER — OLANZAPINE 10 MG/2ML
10 INJECTION, POWDER, FOR SOLUTION INTRAMUSCULAR EVERY 8 HOURS PRN
Status: DISCONTINUED | OUTPATIENT
Start: 2018-10-19 | End: 2018-10-31 | Stop reason: HOSPADM

## 2018-10-19 RX ORDER — MAG HYDROX/ALUMINUM HYD/SIMETH 200-200-20
30 SUSPENSION, ORAL (FINAL DOSE FORM) ORAL EVERY 6 HOURS PRN
Status: DISCONTINUED | OUTPATIENT
Start: 2018-10-19 | End: 2018-10-31 | Stop reason: HOSPADM

## 2018-10-19 RX ORDER — HYDROXYZINE PAMOATE 50 MG/1
50 CAPSULE ORAL NIGHTLY PRN
Status: DISCONTINUED | OUTPATIENT
Start: 2018-10-19 | End: 2018-10-19

## 2018-10-19 RX ORDER — HYDROXYZINE PAMOATE 50 MG/1
50 CAPSULE ORAL EVERY 6 HOURS PRN
Status: DISCONTINUED | OUTPATIENT
Start: 2018-10-19 | End: 2018-10-31 | Stop reason: HOSPADM

## 2018-10-19 RX ORDER — ACETAMINOPHEN 500 MG
1000 TABLET ORAL
Status: COMPLETED | OUTPATIENT
Start: 2018-10-19 | End: 2018-10-19

## 2018-10-19 RX ADMIN — ACETAMINOPHEN 1000 MG: 500 TABLET, FILM COATED ORAL at 01:10

## 2018-10-19 RX ADMIN — HYDROXYZINE PAMOATE 50 MG: 25 CAPSULE ORAL at 02:10

## 2018-10-19 NOTE — ED NOTES
Faxed PEC packet to [facility], [facility], [facility], and [facility].

## 2018-10-19 NOTE — ED NOTES
CPT placement accepted by Nia at [facility] located at [address] [place] [address] for the service of Dr. Tellez. Please call report to [phone] .

## 2018-10-19 NOTE — ED PROVIDER NOTES
"SCRIBE #1 NOTE: I, Brandee Niceen, am scribing for, and in the presence of, Neno Ambrosio Jr., MD. I have scribed the entire note.      History      Chief Complaint   Patient presents with    Suicidal     reports SI/HI, smoked crack this morning with ETOH, took 4 klonopin and 2 xanax this morning.        Review of patient's allergies indicates:   Allergen Reactions    Methadone Other (See Comments)     "I'm not myself."        HPI   HPI    10/19/2018, 8:49 AM   History obtained from the patient and EMS      History of Present Illness: Jo-Ann Wray is a 53 y.o. female patient with a hx of BPD and anxiety who presents to the Emergency Department for SI. Per EMS, pt took 4 Klonopin and 2 Xanax today after smoking crack and drinking EtOH. Pt reports attempting suicide by cutting her wrists, but her fiance prevented her from doing so. Symptoms are constant and moderate in severity. No mitigating or exacerbating factors reported. No associated sxs. Patient denies any fever, chills, N/V, CP, SOB, hallucinations, HI, confusion, and all other sxs at this time. Pt was evaluated in ED for auditory and visual hallucinations. PEC was placed then. No further complaints or concerns at this time.       Arrival mode: EMS    PCP: Primary Doctor No       Past Medical History:  Past Medical History:   Diagnosis Date    Anxiety     Asthma     Bipolar 1 disorder     COPD (chronic obstructive pulmonary disease)     Hypothyroid        Past Surgical History:  Past Surgical History:   Procedure Laterality Date     SECTION      GASTRIC BYPASS           Family History:  Family History   Family history unknown: Yes       Social History:  Social History     Tobacco Use    Smoking status: Current Every Day Smoker     Packs/day: 0.50     Types: Cigarettes    Smokeless tobacco: Never Used   Substance and Sexual Activity    Alcohol use: Yes     Alcohol/week: 2.4 - 3.0 oz     Types: 4 - 5 Cans of beer per week    Drug " use: Yes     Types: Cocaine     Comment: used this morning     Sexual activity: Not on file       ROS   Review of Systems   Constitutional: Negative for chills, diaphoresis and fever.        (+) EtOH and illegal drug use   HENT: Negative for congestion, rhinorrhea and sore throat.    Eyes: Negative for photophobia and visual disturbance.   Respiratory: Negative for cough and shortness of breath.    Cardiovascular: Negative for chest pain and leg swelling.   Gastrointestinal: Negative for abdominal pain, diarrhea, nausea and vomiting.   Genitourinary: Negative for dysuria, frequency and hematuria.   Musculoskeletal: Negative for back pain and neck pain.   Skin: Negative for rash and wound.   Neurological: Negative for dizziness, weakness, numbness and headaches.   Hematological: Does not bruise/bleed easily.   Psychiatric/Behavioral: Positive for suicidal ideas. Negative for agitation, confusion, hallucinations and self-injury. The patient is not nervous/anxious.         (-) HI   All other systems reviewed and are negative.    Physical Exam      Initial Vitals [10/19/18 0848]   BP Pulse Resp Temp SpO2   106/60 64 18 98.8 °F (37.1 °C) 98 %      MAP       --          Physical Exam  Nursing Notes and Vital Signs Reviewed.  Constitutional: Patient is in no acute distress. Well-developed and well-nourished.  Head: Atraumatic. Normocephalic.  Eyes: PERRL. EOM intact. Conjunctivae are not pale. No scleral icterus.  ENT: Mucous membranes are moist. Oropharynx is clear and symmetric.    Cardiovascular: Regular rate. Regular rhythm. No murmurs, rubs, or gallops.   Pulmonary/Chest: No respiratory distress. Clear to auscultation bilaterally. No wheezing or rales.  Abdominal: Soft and non-distended.  There is no tenderness.  No rebound, guarding, or rigidity.   Musculoskeletal: Moves all extremities. No obvious deformities. No edema.  Skin: Warm and dry.  Neurological:  Alert, awake, and appropriate.  Normal speech.  No acute  focal neurological deficits are appreciated.  Psychiatric:               Behavior: normal, cooperative              Mood and Affect: depressed affect              Thought Process: within normal limits              Suicidal Ideations: Yes              Suicidal Plan: Specific plan to harm self. Slit wrists and overdose.              Homicidal Ideations: No              Hallucinations: none    ED Course    Procedures  ED Vital Signs:  Vitals:    10/19/18 0848   BP: 106/60   Pulse: 64   Resp: 18   Temp: 98.8 °F (37.1 °C)   TempSrc: Oral   SpO2: 98%       Abnormal Lab Results:  Labs Reviewed   CBC W/ AUTO DIFFERENTIAL - Abnormal; Notable for the following components:       Result Value    RBC 3.90 (*)     Hemoglobin 9.3 (*)     Hematocrit 30.2 (*)     MCV 77 (*)     MCH 23.8 (*)     MCHC 30.8 (*)     RDW 17.6 (*)     All other components within normal limits   COMPREHENSIVE METABOLIC PANEL - Abnormal; Notable for the following components:    CO2 19 (*)     Glucose 200 (*)     Albumin 3.2 (*)     All other components within normal limits   URINALYSIS, REFLEX TO URINE CULTURE - Abnormal; Notable for the following components:    Specific Gravity, UA <=1.005 (*)     Leukocytes, UA 3+ (*)     All other components within normal limits    Narrative:     Preferred Collection Type->Urine, Clean Catch   DRUG SCREEN PANEL, URINE EMERGENCY - Abnormal; Notable for the following components:    Creatinine, Random Ur 8.1 (*)     All other components within normal limits    Narrative:     Preferred Collection Type->Urine, Clean Catch   ALCOHOL,MEDICAL (ETHANOL) - Abnormal; Notable for the following components:    Alcohol, Medical, Serum 33 (*)     All other components within normal limits   ACETAMINOPHEN LEVEL - Abnormal; Notable for the following components:    Acetaminophen (Tylenol), Serum <3.0 (*)     All other components within normal limits   TSH   URINALYSIS MICROSCOPIC    Narrative:     Preferred Collection Type->Urine, Clean  Catch        All Lab Results:  Results for orders placed or performed during the hospital encounter of 10/19/18   CBC auto differential   Result Value Ref Range    WBC 7.26 3.90 - 12.70 K/uL    RBC 3.90 (L) 4.00 - 5.40 M/uL    Hemoglobin 9.3 (L) 12.0 - 16.0 g/dL    Hematocrit 30.2 (L) 37.0 - 48.5 %    MCV 77 (L) 82 - 98 fL    MCH 23.8 (L) 27.0 - 31.0 pg    MCHC 30.8 (L) 32.0 - 36.0 g/dL    RDW 17.6 (H) 11.5 - 14.5 %    Platelets 293 150 - 350 K/uL    MPV 9.5 9.2 - 12.9 fL   Comprehensive metabolic panel   Result Value Ref Range    Sodium 140 136 - 145 mmol/L    Potassium 3.9 3.5 - 5.1 mmol/L    Chloride 109 95 - 110 mmol/L    CO2 19 (L) 23 - 29 mmol/L    Glucose 200 (H) 70 - 110 mg/dL    BUN, Bld 9 6 - 20 mg/dL    Creatinine 0.7 0.5 - 1.4 mg/dL    Calcium 8.8 8.7 - 10.5 mg/dL    Total Protein 6.1 6.0 - 8.4 g/dL    Albumin 3.2 (L) 3.5 - 5.2 g/dL    Total Bilirubin 0.1 0.1 - 1.0 mg/dL    Alkaline Phosphatase 55 55 - 135 U/L    AST 17 10 - 40 U/L    ALT 15 10 - 44 U/L    Anion Gap 12 8 - 16 mmol/L    eGFR if African American >60 >60 mL/min/1.73 m^2    eGFR if non African American >60 >60 mL/min/1.73 m^2   TSH   Result Value Ref Range    TSH 0.805 0.400 - 4.000 uIU/mL   Urinalysis, Reflex to Urine Culture Urine, Clean Catch   Result Value Ref Range    Specimen UA Urine, Clean Catch     Color, UA Yellow Yellow, Straw, Kristin    Appearance, UA Clear Clear    pH, UA 6.0 5.0 - 8.0    Specific Gravity, UA <=1.005 (A) 1.005 - 1.030    Protein, UA Negative Negative    Glucose, UA Negative Negative    Ketones, UA Negative Negative    Bilirubin (UA) Negative Negative    Occult Blood UA Negative Negative    Nitrite, UA Negative Negative    Urobilinogen, UA Negative <2.0 EU/dL    Leukocytes, UA 3+ (A) Negative   Drug screen panel, emergency   Result Value Ref Range    Benzodiazepines Negative     Methadone metabolites Negative     Cocaine (Metab.) Presumptive Positive     Opiate Scrn, Ur Negative     Barbiturate Screen, Ur Negative      Amphetamine Screen, Ur Negative     THC Negative     Phencyclidine Negative     Creatinine, Random Ur 8.1 (L) 15.0 - 325.0 mg/dL    Toxicology Information SEE COMMENT    Ethanol   Result Value Ref Range    Alcohol, Medical, Serum 33 (H) <10 mg/dL   Acetaminophen level   Result Value Ref Range    Acetaminophen (Tylenol), Serum <3.0 (L) 10.0 - 20.0 ug/mL   Urinalysis Microscopic   Result Value Ref Range    WBC, UA 5 0 - 5 /hpf    Bacteria, UA Rare None-Occ /hpf    Squam Epithel, UA 5 /hpf    Microscopic Comment SEE COMMENT               The Emergency Provider reviewed the vital signs and test results, which are outlined above.    ED Discussion     11:48 AM: The PEC hold has been issued by Dr. Ambrosio at this time for SI.    11:52 AM: Pt has been medically cleared by Dr. Ambrosio at this time. Reassessed pt at this time. Pt is resting comfortably and appears in no acute distress. There are no psychiatric services offered at this facility. D/w pt all pertinent ED information and plan to transfer to psychiatric facility for psychiatric treatment. Pt verbalizes understanding. Patient being transferred by [facility] for ongoing personal protection en route. Pt will be transported by personnel trained in CPR and CPI. All questions and complaints have been addressed at this time. Pt condition is stable at this time and is clear to transfer to psychiatric facility at this time.     ED Medication(s):  Medications - No data to display          Medical Decision Making    Medical Decision Making:   Clinical Tests:   Lab Tests: Reviewed and Ordered           Scribe Attestation:   Scribe #1: I performed the above scribed service and the documentation accurately describes the services I performed. I attest to the accuracy of the note.    Attending:   Physician Attestation Statement for Scribe #1: I, Neno Ambrosio Jr., MD, personally performed the services described in this documentation, as scribed by Brandee Garvey, in my presence, and  it is both accurate and complete.          Clinical Impression       ICD-10-CM ICD-9-CM   1. Depression, unspecified depression type F32.9 311   2. Cocaine abuse F14.10 305.60   3. Suicidal ideation R45.851 V62.84   4. Homicidal ideation R45.850 V62.85       Disposition:   Disposition: Transferred  Condition: Stable         Neno Ambrosio Jr., MD  10/21/18 2002

## 2018-10-19 NOTE — ED NOTES
Patient complains of being homicidal and suicidal. Patient states she plans to OD on her medications.    Level of Consciousness: The patient is awake, alert, and oriented with appropriate affect and speech; oriented to person, place and time.  Appearance: Sitting up in bed with no acute distress noted. Clothing and hygiene are clean and worn appropriately.  Skin: Skin is warm and dry with good skin turgor; intact; color consistent with ethnicity.  Mucous membranes are moist. Generalized bruising.   Musculoskeletal: Moves all extremities well in full range of motion. No obvious deformities or swelling noted.  Respiratory: Airway open and patent, respirations spontaneous, even and unlabored. No accessory muscles in use. Breath sounds clear.  Cardiac: Regular rate and rhythm, no peripheral edema noted, good pulses palpated peripherally, capillary refill < 3 seconds.  Abdomen: Soft, non-tender to palpation. No distention noted.  Neurologic: PERRLA, face exhibits symmetrical expression, hand grasps equal and even bilaterally, reports normal sensation to all extremities and face.  Psychosocial: Calm and cooperative.     Patient verbalized understanding of status and plan of care. Patient changed into grey gown with assistance. Sitter at bedside. Will continue to monitor.

## 2018-10-20 PROBLEM — F39 MOOD DISORDER: Status: ACTIVE | Noted: 2018-08-12

## 2018-10-20 LAB
ALBUMIN SERPL BCP-MCNC: 3.3 G/DL
ALP SERPL-CCNC: 58 U/L
ALT SERPL W/O P-5'-P-CCNC: 13 U/L
ANION GAP SERPL CALC-SCNC: 8 MMOL/L
AST SERPL-CCNC: 17 U/L
BASOPHILS # BLD AUTO: 0.03 K/UL
BASOPHILS NFR BLD: 0.4 %
BILIRUB SERPL-MCNC: 0.3 MG/DL
BUN SERPL-MCNC: 10 MG/DL
CALCIUM SERPL-MCNC: 8.5 MG/DL
CHLORIDE SERPL-SCNC: 109 MMOL/L
CO2 SERPL-SCNC: 24 MMOL/L
CREAT SERPL-MCNC: 0.7 MG/DL
DIFFERENTIAL METHOD: ABNORMAL
EOSINOPHIL # BLD AUTO: 0.1 K/UL
EOSINOPHIL NFR BLD: 0.9 %
ERYTHROCYTE [DISTWIDTH] IN BLOOD BY AUTOMATED COUNT: 17.9 %
EST. GFR  (AFRICAN AMERICAN): >60 ML/MIN/1.73 M^2
EST. GFR  (NON AFRICAN AMERICAN): >60 ML/MIN/1.73 M^2
ESTIMATED AVG GLUCOSE: 120 MG/DL
FERRITIN SERPL-MCNC: 6 NG/ML
FOLATE SERPL-MCNC: 12.6 NG/ML
GLUCOSE SERPL-MCNC: 90 MG/DL
HBA1C MFR BLD HPLC: 5.8 %
HCT VFR BLD AUTO: 32.5 %
HGB BLD-MCNC: 10 G/DL
IRON SERPL-MCNC: 29 UG/DL
LYMPHOCYTES # BLD AUTO: 2.2 K/UL
LYMPHOCYTES NFR BLD: 31.8 %
MCH RBC QN AUTO: 23.5 PG
MCHC RBC AUTO-ENTMCNC: 30.8 G/DL
MCV RBC AUTO: 77 FL
MONOCYTES # BLD AUTO: 0.4 K/UL
MONOCYTES NFR BLD: 6.5 %
NEUTROPHILS # BLD AUTO: 4.1 K/UL
NEUTROPHILS NFR BLD: 60.4 %
PLATELET # BLD AUTO: 479 K/UL
PMV BLD AUTO: 10 FL
POTASSIUM SERPL-SCNC: 4.1 MMOL/L
PROT SERPL-MCNC: 6.5 G/DL
RBC # BLD AUTO: 4.25 M/UL
SATURATED IRON: 6 %
SODIUM SERPL-SCNC: 141 MMOL/L
TOTAL IRON BINDING CAPACITY: 494 UG/DL
TRANSFERRIN SERPL-MCNC: 334 MG/DL
VIT B12 SERPL-MCNC: 218 PG/ML
WBC # BLD AUTO: 6.79 K/UL

## 2018-10-20 PROCEDURE — S4991 NICOTINE PATCH NONLEGEND: HCPCS | Performed by: PSYCHIATRY & NEUROLOGY

## 2018-10-20 PROCEDURE — 99222 1ST HOSP IP/OBS MODERATE 55: CPT | Mod: ,,, | Performed by: FAMILY MEDICINE

## 2018-10-20 PROCEDURE — 83540 ASSAY OF IRON: CPT

## 2018-10-20 PROCEDURE — 36415 COLL VENOUS BLD VENIPUNCTURE: CPT

## 2018-10-20 PROCEDURE — 85025 COMPLETE CBC W/AUTO DIFF WBC: CPT

## 2018-10-20 PROCEDURE — 83036 HEMOGLOBIN GLYCOSYLATED A1C: CPT

## 2018-10-20 PROCEDURE — 82607 VITAMIN B-12: CPT

## 2018-10-20 PROCEDURE — 25000003 PHARM REV CODE 250: Performed by: PSYCHIATRY & NEUROLOGY

## 2018-10-20 PROCEDURE — 90833 PSYTX W PT W E/M 30 MIN: CPT | Mod: ,,, | Performed by: PSYCHIATRY & NEUROLOGY

## 2018-10-20 PROCEDURE — 82728 ASSAY OF FERRITIN: CPT

## 2018-10-20 PROCEDURE — 11400000 HC PSYCH PRIVATE ROOM

## 2018-10-20 PROCEDURE — 80053 COMPREHEN METABOLIC PANEL: CPT

## 2018-10-20 PROCEDURE — 82746 ASSAY OF FOLIC ACID SERUM: CPT

## 2018-10-20 PROCEDURE — 99223 1ST HOSP IP/OBS HIGH 75: CPT | Mod: ,,, | Performed by: PSYCHIATRY & NEUROLOGY

## 2018-10-20 RX ORDER — VENLAFAXINE HYDROCHLORIDE 37.5 MG/1
37.5 CAPSULE, EXTENDED RELEASE ORAL DAILY
Status: DISCONTINUED | OUTPATIENT
Start: 2018-10-20 | End: 2018-10-21

## 2018-10-20 RX ORDER — IBUPROFEN 200 MG
1 TABLET ORAL DAILY
Status: DISCONTINUED | OUTPATIENT
Start: 2018-10-20 | End: 2018-10-31 | Stop reason: HOSPADM

## 2018-10-20 RX ORDER — DIAZEPAM 5 MG/1
5 TABLET ORAL 3 TIMES DAILY
Status: DISCONTINUED | OUTPATIENT
Start: 2018-10-20 | End: 2018-10-22

## 2018-10-20 RX ORDER — LEVOTHYROXINE SODIUM 25 UG/1
75 TABLET ORAL
Status: DISCONTINUED | OUTPATIENT
Start: 2018-10-21 | End: 2018-10-31 | Stop reason: HOSPADM

## 2018-10-20 RX ORDER — PANTOPRAZOLE SODIUM 40 MG/1
40 TABLET, DELAYED RELEASE ORAL DAILY
Status: DISCONTINUED | OUTPATIENT
Start: 2018-10-20 | End: 2018-10-31 | Stop reason: HOSPADM

## 2018-10-20 RX ORDER — QUETIAPINE FUMARATE 100 MG/1
100 TABLET, FILM COATED ORAL NIGHTLY
Status: DISCONTINUED | OUTPATIENT
Start: 2018-10-20 | End: 2018-10-21

## 2018-10-20 RX ORDER — ALBUTEROL SULFATE 90 UG/1
2 AEROSOL, METERED RESPIRATORY (INHALATION) EVERY 6 HOURS PRN
Status: DISCONTINUED | OUTPATIENT
Start: 2018-10-20 | End: 2018-10-31 | Stop reason: HOSPADM

## 2018-10-20 RX ORDER — GABAPENTIN 300 MG/1
300 CAPSULE ORAL 3 TIMES DAILY
Status: DISCONTINUED | OUTPATIENT
Start: 2018-10-20 | End: 2018-10-21

## 2018-10-20 RX ADMIN — GABAPENTIN 300 MG: 300 CAPSULE ORAL at 08:10

## 2018-10-20 RX ADMIN — DIAZEPAM 5 MG: 5 TABLET ORAL at 03:10

## 2018-10-20 RX ADMIN — QUETIAPINE FUMARATE 100 MG: 100 TABLET ORAL at 08:10

## 2018-10-20 RX ADMIN — FOLIC ACID 1 MG: 1 TABLET ORAL at 08:10

## 2018-10-20 RX ADMIN — THERA TABS 1 TABLET: TAB at 08:10

## 2018-10-20 RX ADMIN — PANTOPRAZOLE SODIUM 40 MG: 40 TABLET, DELAYED RELEASE ORAL at 01:10

## 2018-10-20 RX ADMIN — DIAZEPAM 5 MG: 5 TABLET ORAL at 08:10

## 2018-10-20 RX ADMIN — NICOTINE 1 PATCH: 14 PATCH, EXTENDED RELEASE TRANSDERMAL at 01:10

## 2018-10-20 RX ADMIN — GABAPENTIN 300 MG: 300 CAPSULE ORAL at 02:10

## 2018-10-20 RX ADMIN — VENLAFAXINE HYDROCHLORIDE 37.5 MG: 37.5 CAPSULE, EXTENDED RELEASE ORAL at 01:10

## 2018-10-20 RX ADMIN — ALUMINUM HYDROXIDE, MAGNESIUM HYDROXIDE, AND SIMETHICONE 30 ML: 200; 200; 20 SUSPENSION ORAL at 07:10
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
## 2018-10-20 NOTE — PLAN OF CARE
Problem: Patient Care Overview (Adult)  Goal: Plan of Care Review  Outcome: Ongoing (interventions implemented as appropriate)  Pt mostly isolating in her room in bed.Not participating in activities.Appetite fair.Pt depressed and unable to report no when asked if she was still having suicidal thoughts.Medication compliant.

## 2018-10-20 NOTE — PLAN OF CARE
Problem: Overarching Goals (Adult)  Goal: Develops/Participates in [facility]rapeutic [place] to Support Successful Transition    Intervention: Foster Therapeutic [place]  Group Note:    Patient admitted yesterday and  continues to rest in her room. She is still experiencing withdrawal symptoms (UTOX +cocaine) which are being treated with Valium.     Patient will be encouraged to attend group therapy once her withdrawal symptoms improve.

## 2018-10-20 NOTE — CONSULTS
"History & Physical      SUBJECTIVE:     History of Present Illness:  Patient is a 53 y.o. female presents with depression, suicidal ideation and substance abuse. Admitted to [facility].    No past medical history other than psych.  She does complain of chronic persistent cough.  She has a history of hypothyroidism and takes Synthroid for.    PTA Medications   Medication Sig    albuterol (PROAIR HFA) 90 mcg/actuation inhaler Inhale 2 puffs into the lungs every 6 (six) hours as needed for Wheezing. Rescue    clonazePAM (KLONOPIN) 0.5 MG tablet Take 1 tablet (0.5 mg total) by mouth 3 (three) times daily.    gabapentin (NEURONTIN) 300 MG capsule Take 1 capsule (300 mg total) by mouth 3 (three) times daily.    ipratropium (ATROVENT HFA) 17 mcg/actuation inhaler Inhale 2 puffs into the lungs every 6 (six) hours. Rescue    levothyroxine (SYNTHROID) 75 MCG tablet Take 1 tablet (75 mcg total) by mouth before breakfast.    meloxicam (MOBIC) 7.5 MG tablet Take 7.5 mg by mouth once daily.    nicotine, polacrilex, (NICORETTE) 2 mg Gum Take 1 each (2 mg total) by mouth every hour as needed (smoking cessation).    omeprazole (PRILOSEC) 40 MG capsule Take 40 mg by mouth Daily.     ondansetron (ZOFRAN) 4 MG tablet Take 1 tablet (4 mg total) by mouth every 8 (eight) hours as needed.    OXcarbazepine (TRILEPTAL) 300 MG Tab Take 1 tablet (300 mg total) by mouth 2 (two) times daily.    pantoprazole (PROTONIX) 40 MG tablet Take 1 tablet (40 mg total) by mouth once daily.    propranolol (INDERAL) 10 MG tablet Take 10 mg by mouth 2 (two) times daily.    QUEtiapine (SEROQUEL) 200 MG Tab Take 1 tablet (200 mg total) by mouth every evening.       Review of patient's allergies indicates:   Allergen Reactions    Methadone Other (See Comments)     "I'm not myself."       Past Medical History:   Diagnosis Date    Anxiety     Asthma     Bipolar 1 disorder     COPD (chronic obstructive pulmonary disease)     Hypothyroid      Past " Surgical History:   Procedure Laterality Date     SECTION      GASTRIC BYPASS       Family History   Family history unknown: Yes     Social History     Tobacco Use    Smoking status: Current Every Day Smoker     Packs/day: 0.50     Types: Cigarettes    Smokeless tobacco: Never Used   Substance Use Topics    Alcohol use: Yes     Alcohol/week: 2.4 - 3.0 oz     Types: 4 - 5 Cans of beer per week    Drug use: Yes     Types: Cocaine     Comment: used this morning         Review of Systems:  Constitutional: no fever or chills  Respiratory: no cough or shorness of breath  Cardiovascular: no chest pain or palpitations  Gastrointestinal: no nausea or vomiting, no abdominal pain or change in bowel habits  Musculoskeletal: no arthralgias or myalgias    OBJECTIVE:     Vital Signs (Most Recent)  Temp: 96.7 °F (35.9 °C) (10/19/18 2036)  Pulse: 87 (10/19/18 2036)  Resp: 18 (10/19/18 2036)  BP: 132/62 (10/19/18 2036)    Physical Exam:  General: well developed, well nourished  Lungs:  clear to auscultation bilaterally and normal respiratory effort  Cardiovascular: Heart: regular rate and rhythm, S1, S2 normal, no murmur, click, rub or gallop. Chest Wall: no tenderness. Extremities: no cyanosis or edema, or clubbing. Pulses: 2+ and symmetric.  Abdomen/Rectal: Abdomen: soft, non-tender non-distented; bowel sounds normal; no masses,  no organomegaly. Rectal: not examined  Skin: Skin color, texture, turgor normal. No rashes or lesions  Musculoskeletal:normal gait  Psych:   Neuro: Cranial nerves:  CN II Visual fields full to confrontation.   CN III, IV, VI Pupils are equal, round, and reactive to light.  CN III: no palsy  Nystagmus: none   Diplopia: none  Ophthalmoparesis: none  CN V Facial sensation intact.   CN VII Facial expression full, symmetric.   CN VIII normal.   CN IX normal.   CN X normal.   CN XI normal.   CN XII normal.      Laboratory  CBC:   Recent Labs   Lab 10/19/18  1056   WBC 7.26   RBC 3.90*   HGB 9.3*    HCT 30.2*      MCV 77*   MCH 23.8*   MCHC 30.8*     CMP:   Recent Labs   Lab 10/20/18  0653   GLU 90   CALCIUM 8.5*   ALBUMIN 3.3*   PROT 6.5      K 4.1   CO2 24      BUN 10   CREATININE 0.7   ALKPHOS 58   ALT 13   AST 17   BILITOT 0.3     Recent Labs   Lab 10/19/18  1059   COLORU Yellow   SPECGRAV <=1.005*   PHUR 6.0   PROTEINUA Negative   BACTERIA Rare   NITRITE Negative   LEUKOCYTESUR 3+*   UROBILINOGEN Negative     TSH   Date Value Ref Range Status   10/19/2018 0.805 0.400 - 4.000 uIU/mL Final     No results found for this or any previous visit.  Alcohol, Medical, Serum   Date Value Ref Range Status   10/19/2018 33 (H) <10 mg/dL Final     Acetaminophen (Tylenol), Serum   Date Value Ref Range Status   10/19/2018 <3.0 (L) 10.0 - 20.0 ug/mL Final     Comment:     Toxic Levels:  Adults (4 hr post-ingestion).........>150 ug/mL  Adults (12 hr post-ingestion)........>40 ug/mL  Peds (2 hr post-ingestion, liquid)...>225 ug/mL       Results for orders placed or performed during the hospital encounter of 08/12/18   Salicylate level   Result Value Ref Range    Salicylate Lvl <5.0 (L) 15.0 - 30.0 mg/dL     Results for orders placed or performed during the hospital encounter of 10/19/18   Drug screen panel, emergency   Result Value Ref Range    Benzodiazepines Negative     Methadone metabolites Negative     Cocaine (Metab.) Presumptive Positive     Opiate Scrn, Ur Negative     Barbiturate Screen, Ur Negative     Amphetamine Screen, Ur Negative     THC Negative     Phencyclidine Negative     Creatinine, Random Ur 8.1 (L) 15.0 - 325.0 mg/dL    Toxicology Information SEE COMMENT      Preg Test, Ur   Date Value Ref Range Status   10/04/2018 Negative  Final       ASSESSMENT/PLAN:     Active Hospital Problems    Diagnosis  POA    *Depression with suicidal ideation [F32.9, R45.851]  Not Applicable    Hypothyroidism [E03.9]  Yes    Neuropathy [G62.9]  Yes    COPD (chronic obstructive pulmonary disease)  [J44.9]  Yes      Resolved Hospital Problems   No resolved problems to display.       Plan: Further orders for psych  Restart her Atrovent inhalerFor her COPD   continue Synthroid 75 mcg p.o. Daily
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
## 2018-10-21 PROCEDURE — 11400000 HC PSYCH PRIVATE ROOM

## 2018-10-21 PROCEDURE — 99233 SBSQ HOSP IP/OBS HIGH 50: CPT | Mod: ,,, | Performed by: PSYCHIATRY & NEUROLOGY

## 2018-10-21 PROCEDURE — S4991 NICOTINE PATCH NONLEGEND: HCPCS | Performed by: PSYCHIATRY & NEUROLOGY

## 2018-10-21 PROCEDURE — 25000003 PHARM REV CODE 250: Performed by: PSYCHIATRY & NEUROLOGY

## 2018-10-21 PROCEDURE — 90836 PSYTX W PT W E/M 45 MIN: CPT | Mod: ,,, | Performed by: PSYCHIATRY & NEUROLOGY

## 2018-10-21 RX ORDER — VENLAFAXINE HYDROCHLORIDE 75 MG/1
75 CAPSULE, EXTENDED RELEASE ORAL DAILY
Status: DISCONTINUED | OUTPATIENT
Start: 2018-10-22 | End: 2018-10-25

## 2018-10-21 RX ORDER — LANOLIN ALCOHOL/MO/W.PET/CERES
400 CREAM (GRAM) TOPICAL NIGHTLY
Status: DISCONTINUED | OUTPATIENT
Start: 2018-10-21 | End: 2018-10-31 | Stop reason: HOSPADM

## 2018-10-21 RX ORDER — QUETIAPINE FUMARATE 200 MG/1
200 TABLET, FILM COATED ORAL NIGHTLY
Status: DISCONTINUED | OUTPATIENT
Start: 2018-10-21 | End: 2018-10-31 | Stop reason: HOSPADM

## 2018-10-21 RX ORDER — FERROUS SULFATE 325(65) MG
325 TABLET ORAL 2 TIMES DAILY
Status: DISCONTINUED | OUTPATIENT
Start: 2018-10-21 | End: 2018-10-31 | Stop reason: HOSPADM

## 2018-10-21 RX ORDER — GABAPENTIN 400 MG/1
400 CAPSULE ORAL 3 TIMES DAILY
Status: DISCONTINUED | OUTPATIENT
Start: 2018-10-21 | End: 2018-10-22

## 2018-10-21 RX ADMIN — NICOTINE 1 PATCH: 14 PATCH, EXTENDED RELEASE TRANSDERMAL at 08:10

## 2018-10-21 RX ADMIN — GABAPENTIN 300 MG: 300 CAPSULE ORAL at 08:10

## 2018-10-21 RX ADMIN — FERROUS SULFATE TAB 325 MG (65 MG ELEMENTAL FE) 325 MG: 325 (65 FE) TAB at 10:10

## 2018-10-21 RX ADMIN — Medication 400 MG: at 08:10

## 2018-10-21 RX ADMIN — Medication 1 TABLET: at 10:10

## 2018-10-21 RX ADMIN — VENLAFAXINE HYDROCHLORIDE 37.5 MG: 37.5 CAPSULE, EXTENDED RELEASE ORAL at 08:10

## 2018-10-21 RX ADMIN — LEVOTHYROXINE SODIUM 75 MCG: 25 TABLET ORAL at 05:10

## 2018-10-21 RX ADMIN — THERA TABS 1 TABLET: TAB at 08:10

## 2018-10-21 RX ADMIN — FOLIC ACID 1 MG: 1 TABLET ORAL at 08:10

## 2018-10-21 RX ADMIN — PANTOPRAZOLE SODIUM 40 MG: 40 TABLET, DELAYED RELEASE ORAL at 08:10

## 2018-10-21 RX ADMIN — QUETIAPINE FUMARATE 200 MG: 200 TABLET ORAL at 08:10

## 2018-10-21 RX ADMIN — DIAZEPAM 5 MG: 5 TABLET ORAL at 02:10

## 2018-10-21 RX ADMIN — GABAPENTIN 400 MG: 400 CAPSULE ORAL at 02:10

## 2018-10-21 RX ADMIN — FERROUS SULFATE TAB 325 MG (65 MG ELEMENTAL FE) 325 MG: 325 (65 FE) TAB at 08:10

## 2018-10-21 RX ADMIN — DIAZEPAM 5 MG: 5 TABLET ORAL at 08:10

## 2018-10-21 RX ADMIN — GABAPENTIN 400 MG: 400 CAPSULE ORAL at 08:10

## 2018-10-21 NOTE — PLAN OF CARE
Problem: Patient Care Overview (Adult)  Goal: Plan of Care Review  Outcome: Ongoing (interventions implemented as appropriate)  Pt spending most of her time in room in bed.Pt does report slightly improved sleep last night.Appetite good.Pt continues to voice thoughts of wanting to hurt self.Medication compliant.Will continue to monitor pt closely.

## 2018-10-21 NOTE — PLAN OF CARE
Problem: Overarching Goals (Adult)  Goal: Optimized Coping Skills in Response to Life Stressors    Intervention: Promote Effective Coping Strategies  Group Note:    Behavior:  The patient attended group, but refused to participate. She would not even share her name with this counselor. She was guarded and maintained poor eye contact.    Intervention:  The counselor implemented a motivational interviewing based group therapy session with materials and handouts prescribed in the Group Treatment for Substance Abuse, second edition, A Tdacrq-ke-Cqdzvn Therapy Manual. The materials used were from Action/Maintenance Session twelve discussing mindfulness (pp.186-195).    Response:  The patient chose not to respond.    Plan:  Continue to encourage the patient to participate in therapeutic activities.

## 2018-10-21 NOTE — PROGRESS NOTES
"PSYCHIATRY DAILY INPATIENT PROGRESS NOTE  SUBSEQUENT HOSPITAL VISIT    ENCOUNTER DATE: 10/21/2018  SITE: [facility]    DATE OF ADMISSION: 10/19/2018  7:46 PM  LENGTH OF STAY: 2 days      HISTORY    CHIEF COMPLAINT   Jo-Ann Wray is a 53 y.o. female, seen during daily pearson rounds on the inpatient unit.  Jo-Ann Wray presents with the chief complaint of  suicidal ideation, "I wanted to hurt myself."        HPI   (Elements: Location, Quality, Severity, Duration, Timing, Content, Modifying Factors, Associated Signs & Symptoms)    The patient was seen and examined. The chart was reviewed. Reviewed notes by MD, LMSW, RN, and LPN notes from the last 24 hours.     Staff reports no behavioral or management issues.     The patient has been compliant with treatment. The patient denied any side effects.    She reports unchanged symptoms as documented below. Cocaine withdrawals persists. She cites financial stressors ("bills"), inability to be assertive or "say no,"and grief (sister  2 years ago, pt just found last year- she was murdered).     Continued Symptoms of Depression: +diminished mood, + loss of interest/anhedonia; +irritability, +diminished energy, change in sleep---poor sleep, +change in appetite--eating less, +diminished concentration or cognition or indecisiveness, +PMA/R, +excessive guilt or hopelessness or worthlessness, +suicidal ideations     Continued Changes in Sleep: No problems with initiation or maintenance, no early morning awakening with inability to return to sleep; +hypersomnolence with broken night sleep     +Suicidal/(no)Homicidal ideations: +active/passive deations, +organized plans, future intentions---hard for her to imagine the future. When, asked where she sees herself in one year, she said not here.     Denied current Symptoms of psychosis: no hallucinations, delusions, disorganized thinking, disorganized behavior or abnormal motor behavior, or negative symptoms; +h/o " of substance induced psychosis      Denied current Symptoms of paula or hypomania: No current symptoms of paula or hypomania--no elevated, expansive, or irritable mood with no increased energy or activity; with no inflated self-esteem or grandiosity, decreased need for sleep, increased rate of speech, FOI or racing thoughts, distractibility, increased goal directed activity or PMA, or risky/disinhibited behavior; she gives a vague an inconsistent history of Bipolar disorder with possible past hypomania vs paula vs substance induced mood disorder      +Symptoms of CAROL: +excessive anxiety/worry/fear, more days than not, about numerous issues, +difficult to control, with +restlessness, +fatigue, +poor concentration, +irritability, +muscle tension, +sleep disturbance; + causes functionally impairing distress      +Symptoms of Panic Disorder: +recurrent panic attacks, maybe precipitated or un-precipitated, +source of worry and/or behavioral changes secondary; with agoraphobia     +Symptoms of PTSD: +h/o trauma; + re-experiencing/intrusive symptoms, +avoidant behavior,+negative alterations in cognition or mood, or +hyperarousal symptoms; without dissociative symptoms     +Substance Use: Endorses intoxication, withdrawal, tolerance, used in larger amounts or duration than intended, unsuccessful attempts to limit or quit, increased time engaging in or seeking out, cravings or strong desire to use, failure to fulfill obligations, negative consequences in social/interpersonal/occupational,/recreational areas, use in dangerous situations, and medical or psychological consequences     -Continued symptoms of cocaine withdrawal  -Alcohol Withdrawal is currently controlled with valium taper.   -She wants to attend rehab post discharge, ideally in [place].      PSYCHOTHERAPY ADD-ON +[id number]     Time: 40 minutes  Participants: Met with patient    Therapeutic Intervention Type: behavior modifying psychotherapy, supportive  psychotherapy  Why chosen therapy is appropriate versus another modality: relevant to diagnosis, patient responds to this modality, evidence based practice    Target symptoms: alcohol abuse, depression, anxiety , substance abuse  Primary focus: psychosocial stressors, substance use  Psychotherapeutic techniques: supportive, behavioral, and cognitive techniques;     Outcome monitoring methods: self-report, observation    Patient's response to intervention:  The patient's response to intervention is accepting.    Progress toward goals:  The patient's progress toward goals is fair .          ROS  General ROS: negative  Ophthalmic ROS: negative  ENT ROS: negative  Allergy and Immunology ROS: negative  Hematological and Lymphatic ROS: negative  Endocrine ROS: negative  Respiratory ROS: no cough, shortness of breath, or wheezing  Cardiovascular ROS: no chest pain or dyspnea on exertion  Gastrointestinal ROS: no abdominal pain, change in bowel habits, or black or bloody stools  Genito-Urinary ROS: no dysuria, trouble voiding, or hematuria  Musculoskeletal ROS: negative  Neurological ROS: no TIA or stroke symptoms  Dermatological ROS: negative    PAST MEDICAL HISTORY   Past Medical History:   Diagnosis Date    Anxiety     Asthma     Bipolar 1 disorder     COPD (chronic obstructive pulmonary disease)     Hypothyroid            PSYCHOTROPIC MEDICATIONS   Scheduled Meds:   diazePAM  5 mg Oral TID    folic acid  1 mg Oral Daily    gabapentin  300 mg Oral TID    levothyroxine  75 mcg Oral Before breakfast    multivitamin  1 tablet Oral Daily    nicotine  1 patch Transdermal Daily    pantoprazole  40 mg Oral Daily    QUEtiapine  100 mg Oral QHS    venlafaxine  37.5 mg Oral Daily     Continuous Infusions:  PRN Meds:.acetaminophen, albuterol, aluminum-magnesium hydroxide-simethicone, docusate sodium, hydrOXYzine pamoate, loperamide, OLANZapine **AND** OLANZapine        EXAMINATION    VITALS   Vitals:    10/21/18 0832  "  BP: 121/65   Pulse: 75   Resp: 16   Temp: 97.8 °F (36.6 °C)     Body mass index is 28.9 kg/m².      CONSTITUTIONAL  General Appearance: WF, in casual garb; NAD     MUSCULOSKELETAL  Muscle Strength and Tone:  normal  Abnormal Involuntary Movements:  none  Gait and Station:  normal; non-ataxic     PSYCHIATRIC   Level of Consciousness: awake, alert  Orientation: p/p/t/s  Grooming:  adequate to circumstances  Psychomotor Behavior: no PMA/R  Speech: nl r/t/v/s  Language:  English fluent  Mood: "the same"  Affect: decreased range, anxious, dysthymic  Thought Process:  linear and organized  Associations:  intact; no BARBARA  Thought Content:  denied AVH/delusions; denied HI, +SI  Memory:  intact to recent and remote events  Attention:  intact to conversation; not distractible   Fund of Knowledge:  age and education appropriate  Estimate if Intelligence:  average based on work/education history, vocabulary and mental status exam  Insight:  good- seeks help  Judgment:   good- no bx issues, compliant and cooperative      DIAGNOSTIC TESTING   Laboratory Results  No results found for this or any previous visit (from the past 24 hour(s)).      ASSESSMENT      IMPRESSION   Unspecified Mood Disorder  Unspecified Anxiety Disorder  PTSD     Alcohol Use Disorder, severe, continuous, with physiological dependence  Cocaine Use Disorder, severe, continuous, with physiological dependence  Nicotine Dependence     Alcohol Withdrawal  Cocaine Withdrawal     Psychosocial stressors     Hypothyroidism  COPD/Asthma  Chronic Neuropathic pain  GERD     Pre-diabetes  Iron deficient Microcytic Anemia  B12 insuffiencey      MEDICAL DECISION MAKING          PROBLEM LIST AND MANAGEMENT PLANS; PRESCRIPTION DRUG MANAGEMENT  Compliance: yes  Side Effects: no  Regimen Adjustments:   Mood: Increase Seroquel to 200 mg po q HS     Anxiety/PTSD: Increase Effexor XR 75 mg po q day; consider trial of prazosin; gabapentin as below; seroquel as above     Alcohol " Use/Cocaine Use: pt counseled; rehab once stable     Nicotine Dependence: pt counseled; nicotine patch daily     Withdrawal: Continue Valium 5 mg po TID- will taper off as able     Psychosocial stressors: pt counseled; SW to assist with resources     Hypothyroidism: Continue Synthroid 75 mcg po q AM     COPD/Asthma: FM consulted; currently asymptomatic; Albuterol inhaler 2 puffs q 6 hours prn SOB/wheezing     Neuropathy/Chronic Pain: Increase Gabapentin to 400 mg po TID; B12 deficiency likely was contributing- treatment as below     GERD: Continue Protonix 40 mg po q day     Prediabetes: rechecked CMP- levels normalized; HgA1c was elevated in the prediabetes range; likely increased from substance use, diet and lifestyle; pt counseled on exercise and diet      Iron deficient Anemia: Start Iron 325 mg po bid    B12 deficiency: pt declined B12 IM repletion; Start Folbic Po q day     DIAGNOSTIC TESTING  Labs reviewed; follow up pending labs     Disposition:  -SW to assist with aftercare planning and collateral  -Once stable discharge home with outpatient follow up care and/or rehab  -Continue inpatient treatment under a [facility] and/or [facility] for danger to self and grave disability as evident by depression and anxiety with SI and heavy addiction/sustance use requiring inpatient detox and psychiatric stabilization.         NEED FOR CONTINUED HOSPITALIZATION  Psychiatric illness continues to pose a potential threat to life or bodily function, of self or others, thereby requiring the need for continued inpatient psychiatric hospitalization: Yes    Protective inpatient pyschiatric hospitalization required while a safe disposition plan is enacted: Yes    Patient stabilized and ready for discharge from inpatient psychiatric unit: No      STAFF:   Daniel Antunez MD  Psychiatry

## 2018-10-21 NOTE — PLAN OF CARE
Problem: Patient Care Overview (Adult)  Goal: Plan of Care Review  Outcome: Ongoing (interventions implemented as appropriate)  Pt came to the nurses station to request medication for c/o nausea.  Pt received 30 ml mylanta at 1933 then went to lie down.  Pt is isolative to assigned bed and does not participate in unit activities.  Pt is quiet, withdrawn, cooperative.  Scheduled PM meds were brought to pt's bedside and pt did take them.  No further complaints except for the report of nausea.  No interaction with peers.  Appetite is fair.  All precautions maintained.

## 2018-10-22 PROCEDURE — 11400000 HC PSYCH PRIVATE ROOM

## 2018-10-22 PROCEDURE — 25000003 PHARM REV CODE 250: Performed by: PSYCHIATRY & NEUROLOGY

## 2018-10-22 PROCEDURE — 90833 PSYTX W PT W E/M 30 MIN: CPT | Mod: ,,, | Performed by: PSYCHIATRY & NEUROLOGY

## 2018-10-22 PROCEDURE — 99233 SBSQ HOSP IP/OBS HIGH 50: CPT | Mod: ,,, | Performed by: PSYCHIATRY & NEUROLOGY

## 2018-10-22 PROCEDURE — S4991 NICOTINE PATCH NONLEGEND: HCPCS | Performed by: PSYCHIATRY & NEUROLOGY

## 2018-10-22 RX ORDER — DIAZEPAM 2 MG/1
2 TABLET ORAL 4 TIMES DAILY
Status: DISCONTINUED | OUTPATIENT
Start: 2018-10-22 | End: 2018-10-24

## 2018-10-22 RX ORDER — QUETIAPINE FUMARATE 25 MG/1
25 TABLET, FILM COATED ORAL DAILY
Status: DISCONTINUED | OUTPATIENT
Start: 2018-10-22 | End: 2018-10-23

## 2018-10-22 RX ADMIN — VENLAFAXINE HYDROCHLORIDE 75 MG: 75 CAPSULE, EXTENDED RELEASE ORAL at 08:10

## 2018-10-22 RX ADMIN — HYDROXYZINE PAMOATE 50 MG: 50 CAPSULE ORAL at 07:10

## 2018-10-22 RX ADMIN — FERROUS SULFATE TAB 325 MG (65 MG ELEMENTAL FE) 325 MG: 325 (65 FE) TAB at 08:10

## 2018-10-22 RX ADMIN — GABAPENTIN 400 MG: 400 CAPSULE ORAL at 08:10

## 2018-10-22 RX ADMIN — GABAPENTIN 500 MG: 100 CAPSULE ORAL at 08:10

## 2018-10-22 RX ADMIN — GABAPENTIN 500 MG: 100 CAPSULE ORAL at 02:10

## 2018-10-22 RX ADMIN — Medication 1 TABLET: at 08:10

## 2018-10-22 RX ADMIN — Medication 400 MG: at 08:10

## 2018-10-22 RX ADMIN — NICOTINE 1 PATCH: 14 PATCH, EXTENDED RELEASE TRANSDERMAL at 09:10

## 2018-10-22 RX ADMIN — QUETIAPINE FUMARATE 200 MG: 200 TABLET ORAL at 08:10

## 2018-10-22 RX ADMIN — DIAZEPAM 5 MG: 5 TABLET ORAL at 08:10

## 2018-10-22 RX ADMIN — QUETIAPINE FUMARATE 25 MG: 25 TABLET ORAL at 11:10

## 2018-10-22 RX ADMIN — THERA TABS 1 TABLET: TAB at 08:10

## 2018-10-22 RX ADMIN — LEVOTHYROXINE SODIUM 75 MCG: 25 TABLET ORAL at 05:10

## 2018-10-22 RX ADMIN — DIAZEPAM 2 MG: 2 TABLET ORAL at 08:10

## 2018-10-22 RX ADMIN — DIAZEPAM 2 MG: 2 TABLET ORAL at 04:10

## 2018-10-22 RX ADMIN — PANTOPRAZOLE SODIUM 40 MG: 40 TABLET, DELAYED RELEASE ORAL at 08:10

## 2018-10-22 RX ADMIN — FOLIC ACID 1 MG: 1 TABLET ORAL at 08:10

## 2018-10-22 RX ADMIN — DIAZEPAM 2 MG: 2 TABLET ORAL at 01:10

## 2018-10-22 NOTE — PLAN OF CARE
Problem: Patient Care Overview (Adult)  Goal: Plan of Care Review  Outcome: Ongoing (interventions implemented as appropriate)  Pt.  Slept 7.5  Hours so far this shift without problems noted. q 15 min safety checks done this shift. Safety and comfort maintained. Cont. Plan.

## 2018-10-22 NOTE — PROGRESS NOTES
"PSYCHIATRY DAILY INPATIENT PROGRESS NOTE  SUBSEQUENT HOSPITAL VISIT    ENCOUNTER DATE: 10/22/2018  SITE: [facility]    DATE OF ADMISSION: 10/19/2018  7:46 PM  LENGTH OF STAY: 3 days      HISTORY    CHIEF COMPLAINT   Jo-Ann Wray is a 53 y.o. female, seen during daily pearson rounds on the inpatient unit.  Jo-Ann Wray presents with the chief complaint of  suicidal ideation, "I wanted to hurt myself."        HPI   (Elements: Location, Quality, Severity, Duration, Timing, Content, Modifying Factors, Associated Signs & Symptoms)    The patient was seen and examined. The chart was reviewed. Reviewed notes by MD, LMSW, RN, and LPN notes from the last 24 hours.     Staff reports no behavioral or management issues.     The patient has been compliant with treatment. The patient denied any side effects.    She reports continued symptoms as documented below. Cocaine withdrawals persists. She cites financial stressors ("bills"), inability to be assertive or "say no,"and grief (sister  2 years ago, pt just found last year- she was murdered). She has continued anxiety, particularly regarding whether or not she is ready to commit to going to rehab.     Continued Symptoms of Depression: +diminished mood, + loss of interest/anhedonia; +irritability, +diminished energy, change in sleep, +change in appetite--eating less, +diminished concentration or cognition or indecisiveness, less PMA/R, +excessive guilt or hopelessness or worthlessness, +suicidal ideations     Continued but less Changes in Sleep: No problems with initiation or maintenance, no early morning awakening with inability to return to sleep; less hypersomnolence with broken night sleep     Continued Suicidal/(no)Homicidal ideations: +active/passive deations, +organized plans, future intentions---hard for her to imagine the future. When, asked where she sees herself in one year, she said not here.     Denied current Symptoms of psychosis: no " hallucinations, delusions, disorganized thinking, disorganized behavior or abnormal motor behavior, or negative symptoms; +h/o of substance induced psychosis      Denied current Symptoms of paula or hypomania: No current symptoms of paula or hypomania--no elevated, expansive, or irritable mood with no increased energy or activity; with no inflated self-esteem or grandiosity, decreased need for sleep, increased rate of speech, FOI or racing thoughts, distractibility, increased goal directed activity or PMA, or risky/disinhibited behavior; she gives a vague an inconsistent history of Bipolar disorder with possible past hypomania vs paula vs substance induced mood disorder      Continued Symptoms of CAROL: +excessive anxiety/worry/fear, more days than not, about numerous issues, +difficult to control, with +restlessness, +fatigue, +poor concentration, +irritability, +muscle tension, +sleep disturbance; + causes functionally impairing distress      Continued but less Symptoms of Panic Disorder: less panic attacks since admission;  with agoraphobia     Continued Symptoms of PTSD: +h/o trauma; + re-experiencing/intrusive symptoms, +avoidant behavior,+negative alterations in cognition or mood, or +hyperarousal symptoms; without dissociative symptoms     +Substance Use: Endorses intoxication, withdrawal, tolerance, used in larger amounts or duration than intended, unsuccessful attempts to limit or quit, increased time engaging in or seeking out, cravings or strong desire to use, failure to fulfill obligations, negative consequences in social/interpersonal/occupational,/recreational areas, use in dangerous situations, and medical or psychological consequences     -Continued symptoms of cocaine withdrawal  -Alcohol Withdrawal is currently controlled with valium taper.   -She wants to attend rehab post discharge, ideally in [place].      PSYCHOTHERAPY ADD-ON [id number]     Time: 20 minutes  Participants: Met with  patient    Therapeutic Intervention Type: behavior modifying psychotherapy, supportive psychotherapy  Why chosen therapy is appropriate versus another modality: relevant to diagnosis, patient responds to this modality, evidence based practice    Target symptoms: alcohol abuse, depression, anxiety , substance abuse  Primary focus: psychosocial stressors, substance use, depression, anxiety  Psychotherapeutic techniques: supportive, behavioral, and cognitive techniques; motivational techniques    Outcome monitoring methods: self-report, observation    Patient's response to intervention:  The patient's response to intervention is accepting.    Progress toward goals:  The patient's progress toward goals is fair .          ROS  General ROS: negative  Ophthalmic ROS: negative  ENT ROS: negative  Allergy and Immunology ROS: negative  Hematological and Lymphatic ROS: negative  Endocrine ROS: negative  Respiratory ROS: no cough, shortness of breath, or wheezing  Cardiovascular ROS: no chest pain or dyspnea on exertion  Gastrointestinal ROS: no abdominal pain, change in bowel habits, or black or bloody stools  Genito-Urinary ROS: no dysuria, trouble voiding, or hematuria  Musculoskeletal ROS: negative  Neurological ROS: no TIA or stroke symptoms  Dermatological ROS: negative    PAST MEDICAL HISTORY   Past Medical History:   Diagnosis Date    Anxiety     Asthma     Bipolar 1 disorder     COPD (chronic obstructive pulmonary disease)     Hypothyroid          PSYCHOTROPIC MEDICATIONS   Scheduled Meds:   diazePAM  5 mg Oral TID    ferrous sulfate  325 mg Oral BID    folic acid  1 mg Oral Daily    folic acid-vit B6-vit B12 2.5-25-2 mg  1 tablet Oral Daily    gabapentin  400 mg Oral TID    levothyroxine  75 mcg Oral Before breakfast    magnesium oxide  400 mg Oral QHS    multivitamin  1 tablet Oral Daily    nicotine  1 patch Transdermal Daily    pantoprazole  40 mg Oral Daily    QUEtiapine  200 mg Oral QHS     "venlafaxine  75 mg Oral Daily     Continuous Infusions:  PRN Meds:.acetaminophen, albuterol, aluminum-magnesium hydroxide-simethicone, docusate sodium, hydrOXYzine pamoate, loperamide, OLANZapine **AND** OLANZapine        EXAMINATION    VITALS   Vitals:    10/21/18 2034   BP: (!) 125/58   Pulse: 76   Resp: 18   Temp: 98.1 °F (36.7 °C)     Body mass index is 28.9 kg/m².      CONSTITUTIONAL  General Appearance: WF, in casual garb; NAD     MUSCULOSKELETAL  Muscle Strength and Tone:  normal  Abnormal Involuntary Movements:  none  Gait and Station:  normal; non-ataxic     PSYCHIATRIC   Level of Consciousness: awake, alert  Orientation: p/p/t/s  Grooming:  adequate to circumstances  Psychomotor Behavior: no PMA/R  Speech: nl r/t/v/s  Language:  English fluent  Mood: "not good"  Affect: decreased range, anxious, dysthymic  Thought Process:  linear and organized  Associations:  intact; no BARBARA  Thought Content:  denied AVH/delusions; denied HI, +SI  Memory:  intact to recent and remote events  Attention:  intact to conversation; not distractible   Fund of Knowledge:  age and education appropriate  Estimate if Intelligence:  average based on work/education history, vocabulary and mental status exam  Insight:  good- seeks help  Judgment:   good- no bx issues, compliant and cooperative      DIAGNOSTIC TESTING   Laboratory Results  No results found for this or any previous visit (from the past 24 hour(s)).      ASSESSMENT      IMPRESSION   Unspecified Mood Disorder  Unspecified Anxiety Disorder  PTSD     Alcohol Use Disorder, severe, continuous, with physiological dependence  Cocaine Use Disorder, severe, continuous, with physiological dependence  Nicotine Dependence     Alcohol Withdrawal  Cocaine Withdrawal     Psychosocial stressors     Hypothyroidism  COPD/Asthma  Chronic Neuropathic pain  GERD     Pre-diabetes  Iron deficient Microcytic Anemia  B12 insuffiencey      MEDICAL DECISION MAKING          PROBLEM LIST AND " MANAGEMENT PLANS; PRESCRIPTION DRUG MANAGEMENT  Compliance: yes  Side Effects: no  Regimen Adjustments:     Mood: Increase Seroquel to 25 mg po q AM and 200 mg po q HS     Anxiety/PTSD: Increased Effexor XR 75 mg po q day; consider trial of prazosin; gabapentin as below; seroquel as above     Alcohol Use/Cocaine Use: pt counseled; rehab once stable     Nicotine Dependence: pt counseled; nicotine patch daily     Withdrawal: Decrease Valium to 2 mg po QID- will taper off as able     Psychosocial stressors: pt counseled; SW assisting with resources     Hypothyroidism: Continue Synthroid 75 mcg po q AM     COPD/Asthma: FM consulted; currently asymptomatic; Albuterol inhaler 2 puffs q 6 hours prn SOB/wheezing     Neuropathy/Chronic Pain: Increase Gabapentin to 500 mg po TID; B12 deficiency likely was contributing- treatment as below     GERD: Continue Protonix 40 mg po q day     Prediabetes: rechecked CMP- levels normalized; HgA1c was elevated in the prediabetes range; likely increased from substance use, diet and lifestyle; pt counseled on exercise and diet      Iron deficient Anemia: Continue Iron 325 mg po bid    B12 deficiency: pt declined B12 IM repletion; Continue Folbic Po q day     DIAGNOSTIC TESTING  Labs reviewed; follow up pending labs     Disposition:  -SW to assist with aftercare planning and collateral  -Once stable discharge home with outpatient follow up care and/or rehab  -Continue inpatient treatment under a PEC and/or CEC for danger to self and grave disability as evident by depression and anxiety with SI and heavy addiction/sustance use requiring inpatient detox and psychiatric stabilization.         NEED FOR CONTINUED HOSPITALIZATION  Psychiatric illness continues to pose a potential threat to life or bodily function, of self or others, thereby requiring the need for continued inpatient psychiatric hospitalization: Yes    Protective inpatient pyschiatric hospitalization required while a safe  disposition plan is enacted: Yes    Patient stabilized and ready for discharge from inpatient psychiatric unit: No      STAFF:   Daniel Antunez MD  Psychiatry

## 2018-10-22 NOTE — PLAN OF CARE
Problem: Patient Care Overview (Adult)  Goal: Plan of Care Review  Outcome: Ongoing (interventions implemented as appropriate)  Up and about the unit.  Took PM meds then went to bed.  Pt was hesitant to take the ferrous sulfate tablet but eventually did take it along with her other PM meds.  Pt spent some time in the dayroom and watched some TV.  Pt is quiet, withdrawn, guarded, depressed and sad with a flat affect.  Intermittent SI.  Poor interaction with peers.  Isolates to self.  All precautions maintained.

## 2018-10-22 NOTE — PLAN OF CARE
"Problem: Overarching Goals (Adult)  Goal: Develops/Participates in Therapeutic [place] to Support Successful Transition    Intervention: Foster Therapeutic [place]  Safety Plan and Extensive Safety Plan were completed with patient.    Patient hand wrote her extensive safety plan and identified the following: warning signs of crisis, internal coping strategies, people and social settings that provide distractions, people whom she can ask for help as well as professionals or agencies she can contact during a crisis.    The LMSW discussed safety planning with pt's ryan. He denies having any weapons in the home and verbally confirmed he would "secure the knives".    Refer to safety plans in patient's chart for details.         "

## 2018-10-22 NOTE — PLAN OF CARE
Problem: Patient Care Overview (Adult)  Goal: Discharge Needs Assessment  Outcome: Ongoing (interventions implemented as appropriate)  Patient wishes to attend inpatient rehab upon discharge from [facility].

## 2018-10-22 NOTE — PLAN OF CARE
"Problem: Overarching Goals (Adult)  Goal: Develops/Participates in Therapeutic [place] to Support Successful Transition    Intervention: Foster Therapeutic [place]      Collateral Contact:  Patient provided collateral consent for her spouse Taylor Urbano: [phone]    Patient requested to be present when the LMSW placed telephone call to Mr. Urbano.  He was reached at the above number, and placed on speaker phone with patient present. He disclosed the following:  Patient "wasn't sleeping much" "wouldn't listen to me" and "was very agitated". He endorsed she has been in previous psychiatric placentas, most recently over 1 week ago. He was very supportive of pt's plan to seek further treatment at inpatient rehab.   The LMSW discussed safety planning with Mr. Urbano. He verbally denied any weapons in the home and confirmed he would "secure the knives".   He asked how patient was doing. The LMSW informed him patient attended treatment team and met with  to complete assessment and safety plan. She has been cooperative with care and appears motivated to seek further substance abuse treatment. Mr. Urbano verbalized that he loved the patient and thanked the LMSW for calling.   He was advised to call the unit with any further questions or concerns.         "

## 2018-10-22 NOTE — PROGRESS NOTES
"   10/22/18 1030   [facility] Group Therapy   Group Name Leisure Skills Training   Specific Interventions Cognitive Stimulation Training   Participation Level Minimal   Participation Quality Other (see comments)   Insight/Motivation Limited   Affect/Mood Display Depressed   Cognition Alert   Psychomotor WNL   Patient present for the activity group, observed peers participate, sat alone. Patient states "I was scared because I need help reading and writing." Staff offered assistance, expressive art coloring and music to the patient. Patient states she likes coloring. Patient chose to sit and observe and left group about 20 minutes before it ended.  "

## 2018-10-22 NOTE — PLAN OF CARE
"Problem: Overarching Goals (Adult)  Goal: Develops/Participates in Therapeutic [place] to Support Successful Transition    Intervention: Foster Therapeutic [place]  Group Note:    Behavior:  Patient presented for group therapy, exhibited an improved, less-depressed mood. Patient was motivated, participated with out prompting and exhibited insight to her current situation as evidenced by comments below          Intervention:  After establishing group rules, the SW implemented a brief Motivational Interviewing group therapy session. Topics discussed include: goal setting, short-term, long-term goals and potential obstacles one may face when working to achieve defined goals.             Response:  Patient disclosed she plans to attend rehab and then "stay clean" afterwards. She plans to "stay clean" by attending "AA/NA".         Plan:  Encourage continued participation in therapeutic activities                       "
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
## 2018-10-22 NOTE — PLAN OF CARE
"Problem: Patient Care Overview (Adult)  Goal: Interdisciplinary Rounds/Family Conf  Outcome: Ongoing (interventions implemented as appropriate)  INITIAL TREATMENT TEAM       Chief Complaint:  Suicidal Ideation  "I wanted to hurt myself"    Pt was brought to ER  after reportedly taking 4 Klonopin and 2 Xanax after smoking crack and drinking ETOH.  She was recently discharged after from a [facility] after a 7 day stay . Discharge Dx: Drug induced psychosis. Pt has a hx of depression, anxiety and substance use (Crack/Cocaine).         Current:  Patient attended treatment team, appeared depressed and anxious. She was tearful intermittently throughout the meeting.  "My nerves are bad". She reported feeling anxious because she was trying to decide whether to go to treatment or go home.  "I know I need the help" "I miss my dog".  Patient disclosed her fiance was agreeable for her to seek further substance abuse treatment. She disclosed they have been struggling financially which has been an additional stressor. Patient verbalized willingness to sign JEWEL Consents for inpatient rehab referral process to be initiated.       Plan:  Medication management per psychiatrist  Obtain collateral  Assist with community resources   Staff to assist with rehab placement              "
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
## 2018-10-22 NOTE — PLAN OF CARE
Problem: Patient Care Overview (Adult)  Goal: Plan of Care Review  Outcome: Ongoing (interventions implemented as appropriate)  Pt remains depressed and endorses some passive S/I, no plan.  Able to contract for safety.  Pt states she is ready to get better and turn her life around.  Encouraged pt to continue taking meds to help with depression and attend groups to learn new coping skills, understanding verbalized.  Will continue to monitor mood and behavior.

## 2018-10-23 PROCEDURE — 25000003 PHARM REV CODE 250: Performed by: PSYCHIATRY & NEUROLOGY

## 2018-10-23 PROCEDURE — S4991 NICOTINE PATCH NONLEGEND: HCPCS | Performed by: PSYCHIATRY & NEUROLOGY

## 2018-10-23 PROCEDURE — 90833 PSYTX W PT W E/M 30 MIN: CPT | Mod: ,,, | Performed by: PSYCHIATRY & NEUROLOGY

## 2018-10-23 PROCEDURE — 99233 SBSQ HOSP IP/OBS HIGH 50: CPT | Mod: ,,, | Performed by: PSYCHIATRY & NEUROLOGY

## 2018-10-23 PROCEDURE — 11400000 HC PSYCH PRIVATE ROOM

## 2018-10-23 RX ORDER — GABAPENTIN 300 MG/1
600 CAPSULE ORAL 3 TIMES DAILY
Status: DISCONTINUED | OUTPATIENT
Start: 2018-10-23 | End: 2018-10-25

## 2018-10-23 RX ORDER — QUETIAPINE FUMARATE 25 MG/1
50 TABLET, FILM COATED ORAL DAILY
Status: DISCONTINUED | OUTPATIENT
Start: 2018-10-24 | End: 2018-10-24

## 2018-10-23 RX ADMIN — QUETIAPINE FUMARATE 200 MG: 200 TABLET ORAL at 08:10

## 2018-10-23 RX ADMIN — QUETIAPINE FUMARATE 25 MG: 25 TABLET ORAL at 08:10

## 2018-10-23 RX ADMIN — NICOTINE 1 PATCH: 14 PATCH, EXTENDED RELEASE TRANSDERMAL at 09:10

## 2018-10-23 RX ADMIN — THERA TABS 1 TABLET: TAB at 08:10

## 2018-10-23 RX ADMIN — VENLAFAXINE HYDROCHLORIDE 75 MG: 75 CAPSULE, EXTENDED RELEASE ORAL at 08:10

## 2018-10-23 RX ADMIN — PANTOPRAZOLE SODIUM 40 MG: 40 TABLET, DELAYED RELEASE ORAL at 09:10

## 2018-10-23 RX ADMIN — Medication 400 MG: at 08:10

## 2018-10-23 RX ADMIN — GABAPENTIN 500 MG: 100 CAPSULE ORAL at 08:10

## 2018-10-23 RX ADMIN — LEVOTHYROXINE SODIUM 75 MCG: 25 TABLET ORAL at 05:10

## 2018-10-23 RX ADMIN — DIAZEPAM 2 MG: 2 TABLET ORAL at 05:10

## 2018-10-23 RX ADMIN — FERROUS SULFATE TAB 325 MG (65 MG ELEMENTAL FE) 325 MG: 325 (65 FE) TAB at 08:10

## 2018-10-23 RX ADMIN — DIAZEPAM 2 MG: 2 TABLET ORAL at 08:10

## 2018-10-23 RX ADMIN — DIAZEPAM 2 MG: 2 TABLET ORAL at 12:10

## 2018-10-23 RX ADMIN — HYDROXYZINE PAMOATE 50 MG: 50 CAPSULE ORAL at 07:10

## 2018-10-23 RX ADMIN — Medication 1 TABLET: at 08:10

## 2018-10-23 RX ADMIN — GABAPENTIN 600 MG: 300 CAPSULE ORAL at 08:10

## 2018-10-23 RX ADMIN — ACETAMINOPHEN 650 MG: 325 TABLET ORAL at 01:10

## 2018-10-23 RX ADMIN — GABAPENTIN 600 MG: 300 CAPSULE ORAL at 03:10

## 2018-10-23 NOTE — PLAN OF CARE
Problem: Patient Care Overview (Adult)  Goal: Plan of Care Review  Outcome: Ongoing (interventions implemented as appropriate)  Pt is still flat and depressed.  Expresses some passive S/i, no plan.  Able to contract for safety.  Encouraged pt to continue taking all meds and attending groups to help with depression and to report any issues or S/I to staff.  Pt verbalized understanding, will continue to monitor.

## 2018-10-23 NOTE — PROGRESS NOTES
"   10/23/18 1030   [facility] Group Therapy   Group Name Leisure Skills Training   Specific Interventions Coping Skills Training   Participation Level Supportive   Participation Quality Other (see comments)   Insight/Motivation Improved   Affect/Mood Display Flat   Cognition Alert   Psychomotor WNL   Patient reluctant to stay in group, afraid of failing due to not being able to read or write. Staff encouraged participation, offered assistance for the following skills used matching, following direction and making decisions. Patient state "I was scared to play." Patient observed peers learn new game, remembered some of the directions, corrected and offered suggestions when needed to peers. At the end of the activity patient said she think she could play this game and help  supplies.  "

## 2018-10-23 NOTE — PSYCH
The patient has signed a release of information for [facility]. [address] [facility] , [place] [address]. [phone]. A packet has been faxed to [phone].

## 2018-10-23 NOTE — PLAN OF CARE
Problem: Patient Care Overview (Adult)  Goal: Plan of Care Review  Outcome: Ongoing (interventions implemented as appropriate)  Pt cooperative, denies si, denies A/V hallucinations, withdrawn flat affect, med compliant, safety precautions maintained, will continue to monitor

## 2018-10-23 NOTE — PROGRESS NOTES
"PSYCHIATRY DAILY INPATIENT PROGRESS NOTE  SUBSEQUENT HOSPITAL VISIT    ENCOUNTER DATE: 10/23/2018  SITE: [facility]    DATE OF ADMISSION: 10/19/2018  7:46 PM  LENGTH OF STAY: 4 days    HISTORY    CHIEF COMPLAINT   Jo-Ann Wray is a 53 y.o. female, seen during daily pearson rounds on the inpatient unit.  JoA-nn Wray presents with the chief complaint of  suicidal ideation, "I wanted to hurt myself."    HPI   (Elements: Location, Quality, Severity, Duration, Timing, Content, Modifying Factors, Associated Signs & Symptoms)    The patient was seen and examined. The chart was reviewed. Reviewed notes by RNs, CTRS and LMSW notes from the last 24 hours.     Staff reports no behavioral or management issues.     The patient has been compliant with treatment. The patient denied any side effects.    Cocaine withdrawals persists with mild improvement. She cites financial stressors ("bills"), inability to be assertive or "say no,"and grief (sister  2 years ago, pt just found last year- she was murdered). She has continued anxiety, particularly regarding whether or not she is ready to commit to going to rehab. She identified her substance use as a source of socializing, pleasure and stress management, which in part drove her usage.    Continued but less Symptoms of Depression: +diminished mood, + loss of interest/anhedonia; +irritability, +diminished energy, less change in sleep, +change in appetite--eating less, +diminished concentration or cognition or indecisiveness, less PMA/R, +excessive guilt or hopelessness or worthlessness, +suicidal ideations     Continued but lessening Changes in Sleep: No problems with initiation or maintenance, no early morning awakening with inability to return to sleep; no hypersomnolence with less broken night sleep     Continued but less Suicidal/(no)Homicidal ideations: less active/passive deations, no organized plans, no future intentions. She is more future oriented " towards going to rehab and feeling somewhat more hopeful.     Denied current Symptoms of psychosis: no hallucinations, delusions, disorganized thinking, disorganized behavior or abnormal motor behavior, or negative symptoms; +h/o of substance induced psychosis      Denied current Symptoms of paula or hypomania: No current symptoms of paula or hypomania--no elevated, expansive, or irritable mood with no increased energy or activity; with no inflated self-esteem or grandiosity, decreased need for sleep, increased rate of speech, FOI or racing thoughts, distractibility, increased goal directed activity or PMA, or risky/disinhibited behavior; she gives a vague an inconsistent history of Bipolar disorder with possible past hypomania vs paula vs substance induced mood disorder      Continued Symptoms of CAROL: +excessive anxiety/worry/fear, more days than not, about numerous issues, +difficult to control, with +restlessness, +fatigue, +poor concentration, +irritability, +muscle tension, +sleep disturbance; + causes functionally impairing distress      Continued but less Symptoms of Panic Disorder: no panic attacks since admission;  with agoraphobia     Continued Symptoms of PTSD: +h/o trauma; + re-experiencing/intrusive symptoms, +avoidant behavior,+negative alterations in cognition or mood, or +hyperarousal symptoms; without dissociative symptoms     +Substance Use: Endorses intoxication, withdrawal, tolerance, used in larger amounts or duration than intended, unsuccessful attempts to limit or quit, increased time engaging in or seeking out, cravings or strong desire to use, failure to fulfill obligations, negative consequences in social/interpersonal/occupational,/recreational areas, use in dangerous situations, and medical or psychological consequences     -Continued symptoms of cocaine withdrawal  -Alcohol Withdrawal is currently controlled with valium taper.   -She wants to attend rehab post discharge, ideally in [facility]  [place].      PSYCHOTHERAPY ADD-ON [id number]     Time: 20 minutes  Participants: Met with patient    Therapeutic Intervention Type: behavior modifying psychotherapy, supportive psychotherapy  Why chosen therapy is appropriate versus another modality: relevant to diagnosis, patient responds to this modality, evidence based practice    Target symptoms: alcohol abuse, depression, anxiety , substance abuse  Primary focus: psychosocial stressors, substance use, depression, anxiety  Psychotherapeutic techniques: supportive, behavioral, and cognitive techniques; motivational techniques    Outcome monitoring methods: self-report, observation    Patient's response to intervention:  The patient's response to intervention is accepting.    Progress toward goals:  The patient's progress toward goals is fair .        ROS  General ROS: negative  Ophthalmic ROS: negative  ENT ROS: negative  Allergy and Immunology ROS: negative  Hematological and Lymphatic ROS: negative  Endocrine ROS: negative  Respiratory ROS: no cough, shortness of breath, or wheezing  Cardiovascular ROS: no chest pain or dyspnea on exertion  Gastrointestinal ROS: no abdominal pain, change in bowel habits, or black or bloody stools  Genito-Urinary ROS: no dysuria, trouble voiding, or hematuria  Musculoskeletal ROS: negative  Neurological ROS: no TIA or stroke symptoms  Dermatological ROS: negative    PAST MEDICAL HISTORY   Past Medical History:   Diagnosis Date    Addiction to drug     Alcohol abuse     Anxiety     Asthma     Bipolar 1 disorder     COPD (chronic obstructive pulmonary disease)     Depression     Fatigue     History of psychiatric hospitalization     Hx of psychiatric care     Hypothyroid     Psychiatric problem     Depression, Anxiety, Suicidal Ideations    Psychosis     hx of drug induced psychosis    Sleep difficulties     Suicide attempt     2005, 2013 - OD on "medications"    Therapy     [facility]    " "Withdrawal symptoms, alcohol     Withdrawal symptoms, drug or narcotic          PSYCHOTROPIC MEDICATIONS   Scheduled Meds:   diazePAM  2 mg Oral QID    ferrous sulfate  325 mg Oral BID    folic acid-vit B6-vit B12 2.5-25-2 mg  1 tablet Oral Daily    gabapentin  500 mg Oral TID    levothyroxine  75 mcg Oral Before breakfast    magnesium oxide  400 mg Oral QHS    multivitamin  1 tablet Oral Daily    nicotine  1 patch Transdermal Daily    pantoprazole  40 mg Oral Daily    QUEtiapine  200 mg Oral QHS    QUEtiapine  25 mg Oral Daily    venlafaxine  75 mg Oral Daily     Continuous Infusions:  PRN Meds:.acetaminophen, albuterol, aluminum-magnesium hydroxide-simethicone, docusate sodium, hydrOXYzine pamoate, loperamide, OLANZapine **AND** OLANZapine        EXAMINATION    VITALS   Vitals:    10/22/18 2053   BP: 104/74   Pulse: 88   Resp: 16   Temp: 97.9 °F (36.6 °C)     Body mass index is 28.9 kg/m².      CONSTITUTIONAL  General Appearance: WF, in casual garb; NAD     MUSCULOSKELETAL  Muscle Strength and Tone:  normal  Abnormal Involuntary Movements:  none  Gait and Station:  normal; non-ataxic     PSYCHIATRIC   Level of Consciousness: awake, alert  Orientation: p/p/t/s  Grooming:  adequate to circumstances  Psychomotor Behavior: no PMA/R  Speech: nl r/t/v/s  Language:  English fluent  Mood: "a little darius than yesterday"  Affect: decreased range, anxious, dysthymic  Thought Process:  linear and organized  Associations:  intact; no BARBARA  Thought Content:  denied AVH/delusions; denied HI, +SI  Memory:  intact to recent and remote events  Attention:  intact to conversation; not distractible   Fund of Knowledge:  age and education appropriate  Estimate if Intelligence:  average based on work/education history, vocabulary and mental status exam  Insight:  good- seeks help  Judgment:   good- no bx issues, compliant and cooperative      DIAGNOSTIC TESTING   Laboratory Results  No results found for this or any previous " visit (from the past 24 hour(s)).      ASSESSMENT      IMPRESSION   Unspecified Mood Disorder  Unspecified Anxiety Disorder  PTSD     Alcohol Use Disorder, severe, continuous, with physiological dependence  Cocaine Use Disorder, severe, continuous, with physiological dependence  Nicotine Dependence     Alcohol Withdrawal  Cocaine Withdrawal     Psychosocial stressors     Hypothyroidism  COPD/Asthma  Chronic Neuropathic pain  GERD     Pre-diabetes  Iron deficient Microcytic Anemia  B12 insuffiencey      MEDICAL DECISION MAKING          PROBLEM LIST AND MANAGEMENT PLANS; PRESCRIPTION DRUG MANAGEMENT  Compliance: yes  Side Effects: no  Regimen Adjustments:     Mood: Increase Seroquel to 50 mg po q AM and 200 mg po q HS     Anxiety/PTSD: Continued Effexor XR 75 mg po q day; consider trial of prazosin; gabapentin as below; seroquel as above     Alcohol Use/Cocaine Use: pt counseled; rehab once stable     Nicotine Dependence: pt counseled; nicotine patch daily     Withdrawal: Decrease Valium to 2 mg po QID- will taper off as able     Psychosocial stressors: pt counseled; SW assisting with resources     Hypothyroidism: Continue Synthroid 75 mcg po q AM     COPD/Asthma: FM consulted; currently asymptomatic; Albuterol inhaler 2 puffs q 6 hours prn SOB/wheezing     Neuropathy/Chronic Pain: Increase Gabapentin to 600 mg po TID; B12 deficiency likely was contributing- treatment as below     GERD: Continue Protonix 40 mg po q day     Prediabetes: rechecked CMP- levels normalized; HgA1c was elevated in the prediabetes range; likely increased from substance use, diet and lifestyle; pt counseled on exercise and diet      Iron deficient Anemia: Continue Iron 325 mg po bid    B12 deficiency: pt declined B12 IM repletion; Continue Folbic Po q day     DIAGNOSTIC TESTING  Labs reviewed; follow up pending labs     Disposition:  -SW to assist with aftercare planning and collateral  -Once stable discharge home with outpatient follow up  care and/or rehab  -Continue inpatient treatment under a PEC and/or CEC for danger to self and grave disability as evident by depression and anxiety with SI and heavy addiction/sustance use requiring inpatient detox and psychiatric stabilization.         NEED FOR CONTINUED HOSPITALIZATION  Psychiatric illness continues to pose a potential threat to life or bodily function, of self or others, thereby requiring the need for continued inpatient psychiatric hospitalization: Yes    Protective inpatient pyschiatric hospitalization required while a safe disposition plan is enacted: Yes    Patient stabilized and ready for discharge from inpatient psychiatric unit: No      STAFF:   Daniel Antunez MD  Psychiatry

## 2018-10-23 NOTE — PLAN OF CARE
"Problem: Overarching Goals (Adult)  Goal: Develops/Participates in Therapeutic [place] to Support Successful Transition    Intervention: [facility] Therapeutic [place]  Group Note:    Behavior:  Patient attended group therapy, exhibited an improved mood and participated appropriately in group discussion.          Intervention:  The SW implemented a motivational interviewing based group therapy session with materials and handouts prescribed in the Group Treatment for Substance Abuse, second edition, A Lzpghn-az-Lbttbv Therapy Manual. The materials used were from Pre contemplation/Contemplation/Preparation Session Eleven discussing gratitude.         Response:  Patient verbalized she is grateful for the opportunity to attend rehab when she is discharged. She reflected on past attempts when she was "forced by the legal system" to seek treatment. Patient stated she is proud of herself because she "made the decision" herself.      Plan:  Encourage continued participation in therapeutic activities.                         "

## 2018-10-23 NOTE — PLAN OF CARE
Problem: Patient Care Overview (Adult)  Goal: Plan of Care Review  Outcome: Ongoing (interventions implemented as appropriate)  Pt has slept 8 hours with one interruption so far.  NAD.  Resp even & unlabored.  Pathways clear and bed is low.  Q 15 minute safety checks ongoing.  All precautions maintained.

## 2018-10-23 NOTE — PLAN OF CARE
Problem: Overarching Goals (Adult)  Goal: Develops/Participates in Therapeutic [place] to Support Successful Transition    Intervention: [facility]rapeutic [place]  [facility] provided patient with a comprehensive list of community resources  - The [facility] Referral Resource Guide consisting of agencies and their contact information that may be able to provide basic need assistance with food, shelter, clothing and financial assistance. The resource guide was obtained online as well as the [employer] assistance information and contact phone number.    Due to pt's  disclosed difficulty reading she stated her fiance would be able to assist with the above. He was contacted via telephone ,and notified patient will d/c with the above resource information. He verbalized his appreciation.

## 2018-10-24 PROCEDURE — 25000003 PHARM REV CODE 250: Performed by: PSYCHIATRY & NEUROLOGY

## 2018-10-24 PROCEDURE — S4991 NICOTINE PATCH NONLEGEND: HCPCS | Performed by: PSYCHIATRY & NEUROLOGY

## 2018-10-24 PROCEDURE — 99233 SBSQ HOSP IP/OBS HIGH 50: CPT | Mod: ,,, | Performed by: PSYCHIATRY & NEUROLOGY

## 2018-10-24 PROCEDURE — 11400000 HC PSYCH PRIVATE ROOM

## 2018-10-24 RX ORDER — QUETIAPINE FUMARATE 25 MG/1
50 TABLET, FILM COATED ORAL DAILY
Status: DISCONTINUED | OUTPATIENT
Start: 2018-10-24 | End: 2018-10-25

## 2018-10-24 RX ORDER — DIAZEPAM 2 MG/1
2 TABLET ORAL 2 TIMES DAILY
Status: DISCONTINUED | OUTPATIENT
Start: 2018-10-24 | End: 2018-10-25

## 2018-10-24 RX ADMIN — Medication 1 TABLET: at 08:10

## 2018-10-24 RX ADMIN — Medication 400 MG: at 08:10

## 2018-10-24 RX ADMIN — DIAZEPAM 2 MG: 2 TABLET ORAL at 08:10

## 2018-10-24 RX ADMIN — QUETIAPINE FUMARATE 50 MG: 25 TABLET ORAL at 08:10

## 2018-10-24 RX ADMIN — THERA TABS 1 TABLET: TAB at 08:10

## 2018-10-24 RX ADMIN — GABAPENTIN 600 MG: 300 CAPSULE ORAL at 02:10

## 2018-10-24 RX ADMIN — NICOTINE 1 PATCH: 14 PATCH, EXTENDED RELEASE TRANSDERMAL at 09:10

## 2018-10-24 RX ADMIN — FERROUS SULFATE TAB 325 MG (65 MG ELEMENTAL FE) 325 MG: 325 (65 FE) TAB at 08:10

## 2018-10-24 RX ADMIN — QUETIAPINE FUMARATE 200 MG: 200 TABLET ORAL at 08:10

## 2018-10-24 RX ADMIN — PANTOPRAZOLE SODIUM 40 MG: 40 TABLET, DELAYED RELEASE ORAL at 08:10

## 2018-10-24 RX ADMIN — VENLAFAXINE HYDROCHLORIDE 75 MG: 75 CAPSULE, EXTENDED RELEASE ORAL at 08:10

## 2018-10-24 RX ADMIN — LEVOTHYROXINE SODIUM 75 MCG: 25 TABLET ORAL at 05:10

## 2018-10-24 RX ADMIN — ACETAMINOPHEN 650 MG: 325 TABLET ORAL at 02:10

## 2018-10-24 RX ADMIN — GABAPENTIN 600 MG: 300 CAPSULE ORAL at 08:10

## 2018-10-24 NOTE — PSYCH
The patient has signed a release of information for [facility] in [place], [place].  A packet has been faxed to [phone].  Discussed other options for residential treatment ; however, the patient refused to allow any contact with treatment centers outside of the [facility].

## 2018-10-24 NOTE — PROGRESS NOTES
"PSYCHIATRY DAILY INPATIENT PROGRESS NOTE  SUBSEQUENT HOSPITAL VISIT    ENCOUNTER DATE: 10/24/2018  SITE: [facility]    DATE OF ADMISSION: 10/19/2018  7:46 PM  LENGTH OF STAY: 5 days    HISTORY    CHIEF COMPLAINT   Jo-Ann Wray is a 53 y.o. female, seen during daily pearson rounds on the inpatient unit.  Jo-Ann Wray presents with the chief complaint of  suicidal ideation, "I wanted to hurt myself."    HPI   (Elements: Location, Quality, Severity, Duration, Timing, Content, Modifying Factors, Associated Signs & Symptoms)    The patient was seen and examined. The chart was reviewed. Reviewed notes by RNs, CTRS and LMSW notes from the last 24 hours.     Staff reports no behavioral or management issues.     The patient has been compliant with treatment. The patient denied any side effects.    Cocaine withdrawals persists with mild improvement. She continues to have anxiety regarding her psychosocial circumstance.  She is very reserved with significant PMR.  Rapport building attempted.      Psychiatric review of systems largely unchanged given patients affect and unwillingness to expand on questions.  She has a very externalized locus of control.  She is sedated this morning     Continued but less Symptoms of Depression: +diminished mood, + loss of interest/anhedonia; +irritability, +diminished energy, less change in sleep, +change in appetite--eating less, +diminished concentration or cognition or indecisiveness, less PMA/R, +excessive guilt or hopelessness or worthlessness, +suicidal ideations     Continued but lessening Changes in Sleep: No problems with initiation or maintenance, no early morning awakening with inability to return to sleep; no hypersomnolence with less broken night sleep     Continued but less Suicidal/(no)Homicidal ideations: less active/passive deations, no organized plans, no future intentions. She is more future oriented towards going to rehab and feeling somewhat more " hopeful.     Denied current Symptoms of psychosis: no hallucinations, delusions, disorganized thinking, disorganized behavior or abnormal motor behavior, or negative symptoms; +h/o of substance induced psychosis      Denied current Symptoms of paula or hypomania: No current symptoms of paula or hypomania--no elevated, expansive, or irritable mood with no increased energy or activity; with no inflated self-esteem or grandiosity, decreased need for sleep, increased rate of speech, FOI or racing thoughts, distractibility, increased goal directed activity or PMA, or risky/disinhibited behavior; she gives a vague an inconsistent history of Bipolar disorder with possible past hypomania vs paula vs substance induced mood disorder      Continued Symptoms of CAROL: +excessive anxiety/worry/fear, more days than not, about numerous issues, +difficult to control, with +restlessness, +fatigue, +poor concentration, +irritability, +muscle tension, +sleep disturbance; + causes functionally impairing distress      Continued but less Symptoms of Panic Disorder: no panic attacks since admission;  with agoraphobia     Continued Symptoms of PTSD: +h/o trauma; + re-experiencing/intrusive symptoms, +avoidant behavior,+negative alterations in cognition or mood, or +hyperarousal symptoms; without dissociative symptoms     +Substance Use: Endorses intoxication, withdrawal, tolerance, used in larger amounts or duration than intended, unsuccessful attempts to limit or quit, increased time engaging in or seeking out, cravings or strong desire to use, failure to fulfill obligations, negative consequences in social/interpersonal/occupational,/recreational areas, use in dangerous situations, and medical or psychological consequences     -Continued symptoms of cocaine withdrawal  -Alcohol Withdrawal is currently controlled with valium taper.   -She wants to attend rehab post discharge, ideally in [place].      PSYCHOTHERAPY ADD-ON +[id number]  "    Time: 20 minutes  Participants: Met with patient    Therapeutic Intervention Type: behavior modifying psychotherapy, supportive psychotherapy  Why chosen therapy is appropriate versus another modality: relevant to diagnosis, patient responds to this modality, evidence based practice    Target symptoms: alcohol abuse, depression, anxiety , substance abuse  Primary focus: psychosocial stressors, substance use, depression, anxiety  Psychotherapeutic techniques: supportive, behavioral, and cognitive techniques; motivational techniques    Outcome monitoring methods: self-report, observation    Patient's response to intervention:  The patient's response to intervention is accepting.    Progress toward goals:  The patient's progress toward goals is fair .        ROS  General ROS: negative  Ophthalmic ROS: negative  ENT ROS: negative  Allergy and Immunology ROS: negative  Hematological and Lymphatic ROS: negative  Endocrine ROS: negative  Respiratory ROS: no cough, shortness of breath, or wheezing  Cardiovascular ROS: no chest pain or dyspnea on exertion  Gastrointestinal ROS: no abdominal pain, change in bowel habits, or black or bloody stools  Genito-Urinary ROS: no dysuria, trouble voiding, or hematuria  Musculoskeletal ROS: negative  Neurological ROS: no TIA or stroke symptoms  Dermatological ROS: negative    PAST MEDICAL HISTORY   Past Medical History:   Diagnosis Date    Addiction to drug     Alcohol abuse     Anxiety     Asthma     Bipolar 1 disorder     COPD (chronic obstructive pulmonary disease)     Depression     Fatigue     History of psychiatric hospitalization     Hx of psychiatric care     Hypothyroid     Psychiatric problem     Depression, Anxiety, Suicidal Ideations    Psychosis     hx of drug induced psychosis    Sleep difficulties     Suicide attempt     2005, 2013 - OD on "medications"    Therapy     [facility]    Withdrawal symptoms, alcohol     " "Withdrawal symptoms, drug or narcotic          PSYCHOTROPIC MEDICATIONS   Scheduled Meds:   diazePAM  2 mg Oral BID    ferrous sulfate  325 mg Oral BID    folic acid-vit B6-vit B12 2.5-25-2 mg  1 tablet Oral Daily    gabapentin  600 mg Oral TID    levothyroxine  75 mcg Oral Before breakfast    magnesium oxide  400 mg Oral QHS    multivitamin  1 tablet Oral Daily    nicotine  1 patch Transdermal Daily    pantoprazole  40 mg Oral Daily    QUEtiapine  200 mg Oral QHS    QUEtiapine  50 mg Oral Daily    venlafaxine  75 mg Oral Daily     Continuous Infusions:  PRN Meds:.acetaminophen, albuterol, aluminum-magnesium hydroxide-simethicone, docusate sodium, hydrOXYzine pamoate, loperamide, OLANZapine **AND** OLANZapine        EXAMINATION    VITALS   Vitals:    10/24/18 0800   BP: 107/63   Pulse: 71   Resp: 18   Temp: 97.3 °F (36.3 °C)     Body mass index is 30 kg/m².      CONSTITUTIONAL  General Appearance: WF, in casual garb; NAD     MUSCULOSKELETAL  Muscle Strength and Tone:  normal  Abnormal Involuntary Movements:  none  Gait and Station:  normal; non-ataxic     PSYCHIATRIC   Level of Consciousness: awake, alert  Orientation: p/p/t/s  Grooming:  adequate to circumstances  Psychomotor Behavior: +PMR  Speech: nl r/t/v/s  Language:  English fluent  Mood: "alright I guess"  Affect: decreased range, anxious, dysthymic  Thought Process:  linear and organized  Associations:  intact; no BARBARA  Thought Content:  denied AVH/delusions; denied HI, +SI  Memory:  intact to recent and remote events  Attention:  intact to conversation; not distractible   Fund of Knowledge:  age and education appropriate  Estimate if Intelligence:  average based on work/education history, vocabulary and mental status exam  Insight:  good- seeks help  Judgment:   good- no bx issues, compliant and cooperative      DIAGNOSTIC TESTING   Laboratory Results  No results found for this or any previous visit (from the past 24 hour(s)).      ASSESSMENT "      IMPRESSION   Unspecified Mood Disorder  Unspecified Anxiety Disorder  PTSD     Alcohol Use Disorder, severe, continuous, with physiological dependence  Cocaine Use Disorder, severe, continuous, with physiological dependence  Nicotine Dependence     Alcohol Withdrawal  Cocaine Withdrawal     Psychosocial stressors     Hypothyroidism  COPD/Asthma  Chronic Neuropathic pain  GERD     Pre-diabetes  Iron deficient Microcytic Anemia  B12 insuffiencey      MEDICAL DECISION MAKING          PROBLEM LIST AND MANAGEMENT PLANS; PRESCRIPTION DRUG MANAGEMENT  Compliance: yes  Side Effects: no  Regimen Adjustments:     Mood: Continue Seroquel to 50 mg po q AM and 200 mg po q HS     Anxiety/PTSD: Continued Effexor XR 75 mg po q day; consider trial of prazosin; gabapentin as below; seroquel as above     Alcohol Use/Cocaine Use: pt counseled; rehab once stable     Nicotine Dependence: pt counseled; nicotine patch daily     Withdrawal: Decrease Valium to 2 mg po BID- will taper off as able     Psychosocial stressors: pt counseled; SW assisting with resources     Hypothyroidism: Continue Synthroid 75 mcg po q AM     COPD/Asthma: FM consulted; currently asymptomatic; Albuterol inhaler 2 puffs q 6 hours prn SOB/wheezing     Neuropathy/Chronic Pain: Increase Gabapentin to 600 mg po TID; B12 deficiency likely was contributing- treatment as below     GERD: Continue Protonix 40 mg po q day     Prediabetes: rechecked CMP- levels normalized; HgA1c was elevated in the prediabetes range; likely increased from substance use, diet and lifestyle; pt counseled on exercise and diet      Iron deficient Anemia: Continue Iron 325 mg po bid    B12 deficiency: pt declined B12 IM repletion; Continue Folbic Po q day     DIAGNOSTIC TESTING  Labs reviewed; follow up pending labs     Disposition:  -SW to assist with aftercare planning and collateral  -Once stable discharge home with outpatient follow up care and/or rehab  -Continue inpatient treatment  under a PEC and/or CEC for danger to self and grave disability as evident by depression and anxiety with SI and heavy addiction/sustance use requiring inpatient detox and psychiatric stabilization.         NEED FOR CONTINUED HOSPITALIZATION  Psychiatric illness continues to pose a potential threat to life or bodily function, of self or others, thereby requiring the need for continued inpatient psychiatric hospitalization: Yes    Protective inpatient pyschiatric hospitalization required while a safe disposition plan is enacted: Yes    Patient stabilized and ready for discharge from inpatient psychiatric unit: No      STAFF:   Charles Tellez MD  Psychiatry

## 2018-10-24 NOTE — PLAN OF CARE
Problem: Patient Care Overview (Adult)  Goal: Plan of Care Review  Outcome: Ongoing (interventions implemented as appropriate)  Pt out on unit.Pt reports slightly improved sleep last night.Also reports some improvement in mood and denies any suicidal thoughts today.Appetite good.Participating in unit activities.Medication compliant.

## 2018-10-24 NOTE — NURSING
"Received pt. In dayroom at start of the shift watching a TV. Later during one to one pt. Stated she is hoping to go to rehab in [place] and states Nils had her sign a release for [facility]. " My figenae will be able to visit me if I am in [place]." Pt. Report improved mood even thugh affect remains blunted/constricted. Denies detox sxs and denies thoughts of self harm at this time. Cont. Plan.  "

## 2018-10-24 NOTE — PROGRESS NOTES
"   10/24/18 1030   [facility] [[email or web address] Group Therapy   Group Name Leisure Skills Training   Specific Interventions Coping Skills Training   Participation Level None   Patient refused to participate in group. Patient states "I'm tired. I woke up around 5:00 am this morning. I tossed and turned and couldn't go back to sleep." Patient reports she was going to her room to try to get some sleep.  "

## 2018-10-24 NOTE — PSYCH
Called [facility] to follow up on the packet that was sent. The intake person related that they will not have a female bed for two to three weeks.

## 2018-10-24 NOTE — PLAN OF CARE
Problem: Overarching Goals (Adult)  Goal: Optimized Coping Skills in Response to Life Stressors  Patient did not attend Group today. Stating she had a migrane and was going to lie down. Will continue to encourage patient participation in group.

## 2018-10-24 NOTE — PLAN OF CARE
Problem: Patient Care Overview (Adult)  Goal: Plan of Care Review  Outcome: Ongoing (interventions implemented as appropriate)  Pt.  Slept 7  Hours  this shift without problems noted. q 15 min safety checks done this shift. Safety and comfort maintained. Cont. Plan.

## 2018-10-25 PROCEDURE — 11400000 HC PSYCH PRIVATE ROOM

## 2018-10-25 PROCEDURE — 25000003 PHARM REV CODE 250: Performed by: PSYCHIATRY & NEUROLOGY

## 2018-10-25 PROCEDURE — S4991 NICOTINE PATCH NONLEGEND: HCPCS | Performed by: PSYCHIATRY & NEUROLOGY

## 2018-10-25 PROCEDURE — 99233 SBSQ HOSP IP/OBS HIGH 50: CPT | Mod: ,,, | Performed by: PSYCHIATRY & NEUROLOGY

## 2018-10-25 RX ORDER — QUETIAPINE FUMARATE 25 MG/1
75 TABLET, FILM COATED ORAL DAILY
Status: DISCONTINUED | OUTPATIENT
Start: 2018-10-26 | End: 2018-10-29

## 2018-10-25 RX ORDER — DIAZEPAM 2 MG/1
2 TABLET ORAL NIGHTLY
Status: DISCONTINUED | OUTPATIENT
Start: 2018-10-25 | End: 2018-10-30

## 2018-10-25 RX ADMIN — GABAPENTIN 700 MG: 400 CAPSULE ORAL at 08:10

## 2018-10-25 RX ADMIN — DIAZEPAM 2 MG: 2 TABLET ORAL at 08:10

## 2018-10-25 RX ADMIN — PANTOPRAZOLE SODIUM 40 MG: 40 TABLET, DELAYED RELEASE ORAL at 08:10

## 2018-10-25 RX ADMIN — GABAPENTIN 700 MG: 400 CAPSULE ORAL at 02:10

## 2018-10-25 RX ADMIN — QUETIAPINE FUMARATE 200 MG: 200 TABLET ORAL at 08:10

## 2018-10-25 RX ADMIN — QUETIAPINE FUMARATE 50 MG: 25 TABLET ORAL at 08:10

## 2018-10-25 RX ADMIN — GABAPENTIN 600 MG: 300 CAPSULE ORAL at 08:10

## 2018-10-25 RX ADMIN — LEVOTHYROXINE SODIUM 75 MCG: 25 TABLET ORAL at 05:10

## 2018-10-25 RX ADMIN — Medication 1 TABLET: at 08:10

## 2018-10-25 RX ADMIN — HYDROXYZINE PAMOATE 50 MG: 50 CAPSULE ORAL at 01:10

## 2018-10-25 RX ADMIN — VENLAFAXINE HYDROCHLORIDE 75 MG: 75 CAPSULE, EXTENDED RELEASE ORAL at 08:10

## 2018-10-25 RX ADMIN — FERROUS SULFATE TAB 325 MG (65 MG ELEMENTAL FE) 325 MG: 325 (65 FE) TAB at 08:10

## 2018-10-25 RX ADMIN — NICOTINE 1 PATCH: 14 PATCH, EXTENDED RELEASE TRANSDERMAL at 08:10

## 2018-10-25 RX ADMIN — Medication 400 MG: at 08:10

## 2018-10-25 RX ADMIN — THERA TABS 1 TABLET: TAB at 08:10

## 2018-10-25 NOTE — PROGRESS NOTES
"   10/25/18 1030   [facility] Group Therapy   Group Name Leisure Skills Training   Specific Interventions Cognitive Stimulation Training   Participation Level Active;Sharing   Participation Quality Cooperative;Requires Prompting   Insight/Motivation Applies New Skills;Improved   Affect/Mood Display Depressed   Cognition Alert   Psychomotor WNL   Patient reluctant to participate, needed much encouragement, afraid of failing, accepted help. After a  Female patient blurted you(staff) helping her. Patient said "she's helping me because I have a learning disability." Patient read, wrote several items alone and shared.   "

## 2018-10-25 NOTE — PLAN OF CARE
Problem: Overarching Goals (Adult)  Goal: Optimized Coping Skills in Response to Life Stressors    Intervention: Promote Effective Coping Strategies  Group Note    Behavior:  Patient did not stay for group. Patient was sitting in the room, but refused to come to the table and walked out shortly after group began. Patient returned when group was over. Patient presents with depressed mood and affect. Patient states she is unsure if she is going to rehab. States she wants to go to rehab in [place] so people can come on the city bus and visit her. Encouraged patient to be open to other options as well and concentrate on improving herself.       Intervention:  Stress Management. Patients answered true/false questions about how they manage stress. Discussed self care, supports and problem solving.        Plan:  Will continue to encourage patient participation in group.

## 2018-10-25 NOTE — PROGRESS NOTES
"PSYCHIATRY DAILY INPATIENT PROGRESS NOTE  SUBSEQUENT HOSPITAL VISIT    ENCOUNTER DATE: 10/25/2018  SITE: [facility]    DATE OF ADMISSION: 10/19/2018  7:46 PM  LENGTH OF STAY: 6 days    HISTORY    CHIEF COMPLAINT   Jo-Ann Wray is a 53 y.o. female, seen during daily pearson rounds on the inpatient unit.  Jo-Ann Wray presents with the chief complaint of  suicidal ideation, "I wanted to hurt myself."    HPI   (Elements: Location, Quality, Severity, Duration, Timing, Content, Modifying Factors, Associated Signs & Symptoms)    The patient was seen and examined. The chart was reviewed. Reviewed notes by MD and CTRS from the last 24 hours.     Staff reports no behavioral or management issues. She sporadically attends and participates in group activities. She attended some groups today    The patient has been compliant with treatment. The patient denied any side effects.    Cocaine withdrawals persists with mild improvement. Alcohol withdrawal is being managed well at this time. She continues to have anxiety regarding her significant psychosocial circumstance.      She had a migraine yesterday which she attributes to her minimal participation. She reports that she is making some improvement     Continued but less Symptoms of Depression: +diminished mood, + loss of interest/anhedonia; less irritability, less diminished energy, no change in sleep, less change in appetite--eating better, +diminished concentration or cognition or indecisiveness, less PMA/R, less excessive guilt or hopelessness or worthlessness, less suicidal ideations     Continued but lessening Changes in Sleep: No problems with initiation or maintenance, no early morning awakening with inability to return to sleep; no hypersomnolence     Continued but lessening Suicidal/(no)Homicidal ideations: less active/passive deations, no organized plans, no future intentions. She is more future oriented towards going to rehab and feeling somewhat " more hopeful regarding her future.      Denied current Symptoms of psychosis: no hallucinations, delusions, disorganized thinking, disorganized behavior or abnormal motor behavior, or negative symptoms; +h/o of substance induced psychosis      Denied current Symptoms of paula or hypomania: No current symptoms of paula or hypomania--no elevated, expansive, or irritable mood with no increased energy or activity; with no inflated self-esteem or grandiosity, decreased need for sleep, increased rate of speech, FOI or racing thoughts, distractibility, increased goal directed activity or PMA, or risky/disinhibited behavior; she gives a vague an inconsistent history of Bipolar disorder with possible past hypomania vs paula vs substance induced mood disorder      Continued but less Symptoms of CAROL: less excessive anxiety/worry/fear, +difficult to control, with less restlessness, fatigue, poor concentration, irritability, muscle tension, and sleep disturbance    Controlled Symptoms of Panic Disorder: no panic attacks since admission;  with agoraphobia     Continued but less Symptoms of PTSD: +h/o trauma; + re-experiencing/intrusive symptoms, +avoidant behavior,+negative alterations in cognition or mood, or +hyperarousal symptoms; without dissociative symptoms     +Substance Use: Endorses intoxication, withdrawal, tolerance, used in larger amounts or duration than intended, unsuccessful attempts to limit or quit, increased time engaging in or seeking out, cravings or strong desire to use, failure to fulfill obligations, negative consequences in social/interpersonal/occupational,/recreational areas, use in dangerous situations, and medical or psychological consequences     -Continued symptoms of cocaine withdrawal  -Alcohol Withdrawal is currently controlled and improving with valium taper.   -She wants to attend rehab post discharge, ideally in [place].            ROS  General ROS: negative  Ophthalmic ROS: negative  ENT  "ROS: negative  Allergy and Immunology ROS: negative  Hematological and Lymphatic ROS: negative  Endocrine ROS: negative  Respiratory ROS: no cough, shortness of breath, or wheezing  Cardiovascular ROS: no chest pain or dyspnea on exertion  Gastrointestinal ROS: no abdominal pain, change in bowel habits, or black or bloody stools  Genito-Urinary ROS: no dysuria, trouble voiding, or hematuria  Musculoskeletal ROS: negative  Neurological ROS: no TIA or stroke symptoms  Dermatological ROS: negative    PAST MEDICAL HISTORY   Past Medical History:   Diagnosis Date    Addiction to drug     Alcohol abuse     Anxiety     Asthma     Bipolar 1 disorder     COPD (chronic obstructive pulmonary disease)     Depression     Fatigue     History of psychiatric hospitalization     Hx of psychiatric care     Hypothyroid     Psychiatric problem     Depression, Anxiety, Suicidal Ideations    Psychosis     hx of drug induced psychosis    Sleep difficulties     Suicide attempt     2005, 2013 - OD on "medications"    Therapy     [facility]    Withdrawal symptoms, alcohol     Withdrawal symptoms, drug or narcotic          PSYCHOTROPIC MEDICATIONS   Scheduled Meds:   diazePAM  2 mg Oral BID    ferrous sulfate  325 mg Oral BID    folic acid-vit B6-vit B12 2.5-25-2 mg  1 tablet Oral Daily    gabapentin  600 mg Oral TID    levothyroxine  75 mcg Oral Before breakfast    magnesium oxide  400 mg Oral QHS    multivitamin  1 tablet Oral Daily    nicotine  1 patch Transdermal Daily    pantoprazole  40 mg Oral Daily    QUEtiapine  200 mg Oral QHS    QUEtiapine  50 mg Oral Daily    venlafaxine  75 mg Oral Daily     Continuous Infusions:  PRN Meds:.acetaminophen, albuterol, aluminum-magnesium hydroxide-simethicone, docusate sodium, hydrOXYzine pamoate, loperamide, OLANZapine **AND** OLANZapine        EXAMINATION    VITALS   Vitals:    10/25/18 0733   BP: (!) 106/55   Pulse: 77   Resp: 20   Temp: " "98.7 °F (37.1 °C)     Body mass index is 30 kg/m².      CONSTITUTIONAL  General Appearance: WF, in casual garb; NAD     MUSCULOSKELETAL  Muscle Strength and Tone:  normal  Abnormal Involuntary Movements:  none  Gait and Station:  normal; non-ataxic     PSYCHIATRIC   Level of Consciousness: awake, alert  Orientation: p/p/t/s  Grooming:  adequate to circumstances  Psychomotor Behavior: less PMR  Speech: nl r/t/v/s  Language:  English fluent  Mood: "a little better"  Affect: decreased range, less anxious, less dysthymic  Thought Process:  linear and organized  Associations:  intact; no BARBARA  Thought Content:  denied AVH/delusions; denied HI, less SI  Memory:  intact to recent and remote events  Attention:  intact to conversation; not distractible   Fund of Knowledge:  age and education appropriate  Estimate if Intelligence:  average based on work/education history, vocabulary and mental status exam  Insight:  good- seeks help  Judgment:   good- no bx issues, compliant and cooperative      DIAGNOSTIC TESTING   Laboratory Results  No results found for this or any previous visit (from the past 24 hour(s)).      ASSESSMENT      IMPRESSION   Unspecified Mood Disorder  Unspecified Anxiety Disorder  PTSD     Alcohol Use Disorder, severe, continuous, with physiological dependence  Cocaine Use Disorder, severe, continuous, with physiological dependence  Nicotine Dependence     Alcohol Withdrawal  Cocaine Withdrawal     Psychosocial stressors     Hypothyroidism  COPD/Asthma  Chronic Neuropathic pain  GERD     Pre-diabetes  Iron deficient Microcytic Anemia  B12 insuffiencey      MEDICAL DECISION MAKING          PROBLEM LIST AND MANAGEMENT PLANS; PRESCRIPTION DRUG MANAGEMENT  Compliance: yes  Side Effects: no  Regimen Adjustments:     Mood: Increase Seroquel to 75 mg po q AM and 200 mg po q HS     Anxiety/PTSD: Increase Effexor XR to 112.5 mg po q day; consider trial of prazosin; gabapentin as below; seroquel as above     Alcohol " Use/Cocaine Use: pt counseled; rehab once stable     Nicotine Dependence: pt counseled; nicotine patch daily     Withdrawal: Decrease Valium to 2 mg po q HS- will taper off as able     Psychosocial stressors: pt counseled; SW assisting with resources     Hypothyroidism: Continue Synthroid 75 mcg po q AM     COPD/Asthma: FM consulted; currently asymptomatic; Albuterol inhaler 2 puffs q 6 hours prn SOB/wheezing     Neuropathy/Chronic Pain: Increase Gabapentin to 700 mg po TID; B12 deficiency likely was contributing- treatment as below     GERD: Continue Protonix 40 mg po q day     Prediabetes: rechecked CMP- levels normalized; HgA1c was elevated in the prediabetes range; likely increased from substance use, diet and lifestyle; pt counseled on exercise and diet      Iron deficient Anemia: Continue Iron 325 mg po bid    B12 deficiency: pt declined B12 IM repletion; Continue Folbic Po q day     DIAGNOSTIC TESTING  Labs reviewed; follow up pending labs     Disposition:  -SW to assist with aftercare planning and collateral  -Once stable discharge home with outpatient follow up care and/or rehab  -Continue inpatient treatment under a PEC and/or CEC for danger to self and grave disability as evident by depression and anxiety with SI and heavy addiction/sustance use requiring inpatient detox and psychiatric stabilization.         NEED FOR CONTINUED HOSPITALIZATION  Psychiatric illness continues to pose a potential threat to life or bodily function, of self or others, thereby requiring the need for continued inpatient psychiatric hospitalization: Yes    Protective inpatient pyschiatric hospitalization required while a safe disposition plan is enacted: Yes    Patient stabilized and ready for discharge from inpatient psychiatric unit: No      STAFF:   Daniel Antunez MD  Psychiatry

## 2018-10-25 NOTE — NURSING
Received pt.watching TV at start of the shift. Pt. Remains isolative to self, minimal interaction, soft spoken. During one to one pt. Report improved mood since admission, but seemed preoccupied. Pt. Focused on rehab in [facility]. Reassurance and support provided. Cont. Plan.

## 2018-10-25 NOTE — PLAN OF CARE
Problem: Patient Care Overview (Adult)  Goal: Plan of Care Review  Outcome: Ongoing (interventions implemented as appropriate)  Pt out on unit off and on.Participating in some activities.Appetite adequate.Hygiene and grooming fair.Mood remains down but denies suicidal thoughts.Pt c/o anxiety and received prn Vistaril 50mg po.Medication compliant.

## 2018-10-26 PROCEDURE — 86580 TB INTRADERMAL TEST: CPT | Performed by: PSYCHIATRY & NEUROLOGY

## 2018-10-26 PROCEDURE — 25000003 PHARM REV CODE 250: Performed by: PSYCHIATRY & NEUROLOGY

## 2018-10-26 PROCEDURE — 11400000 HC PSYCH PRIVATE ROOM

## 2018-10-26 PROCEDURE — S4991 NICOTINE PATCH NONLEGEND: HCPCS | Performed by: PSYCHIATRY & NEUROLOGY

## 2018-10-26 PROCEDURE — 63600175 PHARM REV CODE 636 W HCPCS: Performed by: PSYCHIATRY & NEUROLOGY

## 2018-10-26 PROCEDURE — 99232 SBSQ HOSP IP/OBS MODERATE 35: CPT | Mod: ,,, | Performed by: PSYCHIATRY & NEUROLOGY

## 2018-10-26 RX ADMIN — NICOTINE 1 PATCH: 14 PATCH, EXTENDED RELEASE TRANSDERMAL at 08:10

## 2018-10-26 RX ADMIN — LEVOTHYROXINE SODIUM 75 MCG: 25 TABLET ORAL at 05:10

## 2018-10-26 RX ADMIN — GABAPENTIN 700 MG: 400 CAPSULE ORAL at 08:10

## 2018-10-26 RX ADMIN — FERROUS SULFATE TAB 325 MG (65 MG ELEMENTAL FE) 325 MG: 325 (65 FE) TAB at 08:10

## 2018-10-26 RX ADMIN — DIAZEPAM 2 MG: 2 TABLET ORAL at 08:10

## 2018-10-26 RX ADMIN — VENLAFAXINE HYDROCHLORIDE 112.5 MG: 75 CAPSULE, EXTENDED RELEASE ORAL at 08:10

## 2018-10-26 RX ADMIN — THERA TABS 1 TABLET: TAB at 08:10

## 2018-10-26 RX ADMIN — TUBERCULIN PURIFIED PROTEIN DERIVATIVE 5 UNITS: 5 INJECTION INTRADERMAL at 03:10

## 2018-10-26 RX ADMIN — QUETIAPINE FUMARATE 75 MG: 25 TABLET ORAL at 08:10

## 2018-10-26 RX ADMIN — HYDROXYZINE PAMOATE 50 MG: 50 CAPSULE ORAL at 04:10

## 2018-10-26 RX ADMIN — PANTOPRAZOLE SODIUM 40 MG: 40 TABLET, DELAYED RELEASE ORAL at 08:10

## 2018-10-26 RX ADMIN — QUETIAPINE FUMARATE 200 MG: 200 TABLET ORAL at 08:10

## 2018-10-26 RX ADMIN — Medication 400 MG: at 08:10

## 2018-10-26 RX ADMIN — GABAPENTIN 700 MG: 400 CAPSULE ORAL at 02:10

## 2018-10-26 RX ADMIN — Medication 1 TABLET: at 08:10

## 2018-10-26 NOTE — PLAN OF CARE
Problem: Patient Care Overview (Adult)  Goal: Plan of Care Review  Outcome: Ongoing (interventions implemented as appropriate)  Plan of care reviewed with patient, patient agrees with plan.  Denies suicidal ideations and homicidal ideations. Accepts meals, snacks and medications. Gait steady, no falls. Clear pathways and clutter-free environment maintained. Isolates in room most of time. Promoted an individualized safety plan, effective coping skills, and a drug free lifestyle. Will continue to monitor for safety.

## 2018-10-26 NOTE — PSYCH
The pre- admit screening form has been faxed to [facility], [facility].

## 2018-10-26 NOTE — PROGRESS NOTES
"PSYCHIATRY DAILY INPATIENT PROGRESS NOTE  SUBSEQUENT HOSPITAL VISIT    ENCOUNTER DATE: 10/26/2018  SITE: [facility]    DATE OF ADMISSION: 10/19/2018  7:46 PM  LENGTH OF STAY: 7 days    HISTORY    CHIEF COMPLAINT   Jo-Ann Wray is a 53 y.o. female, seen during daily pearson rounds on the inpatient unit.  Jo-Ann Wray presents with the chief complaint of  suicidal ideation, "I wanted to hurt myself."    HPI   (Elements: Location, Quality, Severity, Duration, Timing, Content, Modifying Factors, Associated Signs & Symptoms)    The patient was seen and examined. The chart was reviewed. Reviewed notes by MD and CTRS from the last 24 hours.     Staff reports no behavioral or management issues. She sporadically attends and participates in group activities.     The patient has been compliant with treatment. The patient denied any side effects.    Patients withdrawal has subsided.  Her mood is improved.  She is future oriented toward rehab.      Improved Symptoms of Depression: less diminished mood, + loss of interest/anhedonia; less irritability, less diminished energy, no change in sleep, less change in appetite--eating better, +diminished concentration or cognition or indecisiveness, less PMA/R, less excessive guilt or hopelessness or worthlessness, no suicidal ideations     Improved Changes in Sleep: No problems with initiation or maintenance, no early morning awakening with inability to return to sleep; no hypersomnolence     Improved / Resolved Suicidal/(no)Homicidal ideations: no active/passive deations, no organized plans, no future intentions. She is more future oriented towards going to rehab      No symptoms of psychosis or paula     Continued but less Symptoms of CAROL: less excessive anxiety/worry/fear, +difficult to control, with less restlessness, fatigue, poor concentration, irritability, muscle tension, and sleep disturbance    Controlled Symptoms of Panic Disorder: no panic attacks since " "admission;  with agoraphobia     Continued but less Symptoms of PTSD: +h/o trauma; + re-experiencing/intrusive symptoms, +avoidant behavior,+negative alterations in cognition or mood, or +hyperarousal symptoms; without dissociative symptoms     +Substance Use: Endorses intoxication, withdrawal, tolerance, used in larger amounts or duration than intended, unsuccessful attempts to limit or quit, increased time engaging in or seeking out, cravings or strong desire to use, failure to fulfill obligations, negative consequences in social/interpersonal/occupational,/recreational areas, use in dangerous situations, and medical or psychological consequences     Improved cocaine withdrawal  -Alcohol Withdrawal is currently controlled and improving with valium taper.   -She wants to attend rehab post discharge, ideally in [place].    ROS  General ROS: negative  Ophthalmic ROS: negative  ENT ROS: negative  Allergy and Immunology ROS: negative  Hematological and Lymphatic ROS: negative  Endocrine ROS: negative  Respiratory ROS: no cough, shortness of breath, or wheezing  Cardiovascular ROS: no chest pain or dyspnea on exertion  Gastrointestinal ROS: no abdominal pain, change in bowel habits, or black or bloody stools  Genito-Urinary ROS: no dysuria, trouble voiding, or hematuria  Musculoskeletal ROS: negative  Neurological ROS: no TIA or stroke symptoms  Dermatological ROS: negative    PAST MEDICAL HISTORY   Past Medical History:   Diagnosis Date    Addiction to drug     Alcohol abuse     Anxiety     Asthma     Bipolar 1 disorder     COPD (chronic obstructive pulmonary disease)     Depression     Fatigue     History of psychiatric hospitalization     Hx of psychiatric care     Hypothyroid     Psychiatric problem     Depression, Anxiety, Suicidal Ideations    Psychosis     hx of drug induced psychosis    Sleep difficulties     Suicide attempt     2005, 2013 - OD on "medications"    Therapy     [facility]    Withdrawal symptoms, alcohol     Withdrawal symptoms, drug or narcotic          PSYCHOTROPIC MEDICATIONS   Scheduled Meds:   diazePAM  2 mg Oral QHS    ferrous sulfate  325 mg Oral BID    folic acid-vit B6-vit B12 2.5-25-2 mg  1 tablet Oral Daily    gabapentin  700 mg Oral TID    levothyroxine  75 mcg Oral Before breakfast    magnesium oxide  400 mg Oral QHS    multivitamin  1 tablet Oral Daily    nicotine  1 patch Transdermal Daily    pantoprazole  40 mg Oral Daily    QUEtiapine  200 mg Oral QHS    QUEtiapine  75 mg Oral Daily    venlafaxine  112.5 mg Oral Daily     Continuous Infusions:  PRN Meds:.acetaminophen, albuterol, aluminum-magnesium hydroxide-simethicone, docusate sodium, hydrOXYzine pamoate, loperamide, OLANZapine **AND** OLANZapine        EXAMINATION    VITALS   Vitals:    10/26/18 0722   BP: (!) 117/56   Pulse: 71   Resp: 16   Temp: 98.3 °F (36.8 °C)     Body mass index is 30 kg/m².      CONSTITUTIONAL  General Appearance: WF, in casual garb; NAD     MUSCULOSKELETAL  Muscle Strength and Tone:  normal  Abnormal Involuntary Movements:  none  Gait and Station:  normal; non-ataxic     PSYCHIATRIC   Level of Consciousness: awake, alert  Orientation: p/p/t/s  Grooming:  adequate to circumstances  Psychomotor Behavior: less PMR  Speech: nl r/t/v/s  Language:  English fluent  Mood: "good"  Affect: improved range, less anxious, less dysthymic  Thought Process:  linear and organized  Associations:  intact; no BARBARA  Thought Content:  denied AVH/delusions; denied HI, no SI  Memory:  intact to recent and remote events  Attention:  intact to conversation; not distractible   Fund of Knowledge:  age and education appropriate  Estimate if Intelligence:  average based on work/education history, vocabulary and mental status exam  Insight:  good- seeks help  Judgment:   good- no bx issues, compliant and cooperative      DIAGNOSTIC TESTING   Laboratory Results  No results found " for this or any previous visit (from the past 24 hour(s)).      ASSESSMENT      IMPRESSION   Unspecified Mood Disorder  Unspecified Anxiety Disorder  PTSD     Alcohol Use Disorder, severe, continuous, with physiological dependence  Cocaine Use Disorder, severe, continuous, with physiological dependence  Nicotine Dependence     Alcohol Withdrawal  Cocaine Withdrawal     Psychosocial stressors     Hypothyroidism  COPD/Asthma  Chronic Neuropathic pain  GERD     Pre-diabetes  Iron deficient Microcytic Anemia  B12 insuffiencey      MEDICAL DECISION MAKING          PROBLEM LIST AND MANAGEMENT PLANS; PRESCRIPTION DRUG MANAGEMENT  Compliance: yes  Side Effects: no  Regimen Adjustments:     Mood: Increase Seroquel to 75 mg po q AM and 200 mg po q HS     Anxiety/PTSD: Increase Effexor XR to 112.5 mg po q day; consider trial of prazosin; gabapentin as below; seroquel as above     Alcohol Use/Cocaine Use: pt counseled; rehab once stable     Nicotine Dependence: pt counseled; nicotine patch daily     Withdrawal: Decrease Valium to 2 mg po q HS- will taper off as able     Psychosocial stressors: pt counseled; SW assisting with resources     Hypothyroidism: Continue Synthroid 75 mcg po q AM     COPD/Asthma: FM consulted; currently asymptomatic; Albuterol inhaler 2 puffs q 6 hours prn SOB/wheezing     Neuropathy/Chronic Pain: Increase Gabapentin to 700 mg po TID; B12 deficiency likely was contributing- treatment as below     GERD: Continue Protonix 40 mg po q day     Prediabetes: rechecked CMP- levels normalized; HgA1c was elevated in the prediabetes range; likely increased from substance use, diet and lifestyle; pt counseled on exercise and diet      Iron deficient Anemia: Continue Iron 325 mg po bid    B12 deficiency: pt declined B12 IM repletion; Continue Folbic Po q day     DIAGNOSTIC TESTING  Labs reviewed; follow up pending labs     Disposition:  -SW to assist with aftercare planning and collateral  -Once stable discharge  home with outpatient follow up care and/or rehab  -Continue inpatient treatment under a PEC and/or CEC for danger to self and grave disability as evident by depression and anxiety with SI and heavy addiction/sustance use requiring inpatient detox and psychiatric stabilization.         NEED FOR CONTINUED HOSPITALIZATION  Psychiatric illness continues to pose a potential threat to life or bodily function, of self or others, thereby requiring the need for continued inpatient psychiatric hospitalization: Yes    Protective inpatient pyschiatric hospitalization required while a safe disposition plan is enacted: Yes    Patient stabilized and ready for discharge from inpatient psychiatric unit: No      STAFF:   Charles Tellez MD  Psychiatry

## 2018-10-26 NOTE — PLAN OF CARE
PPD Tuberculin skin test 0.1 ml, applied to left inner forearm/antecubital area. Patient tolerated procedure. Skin test to be read no earlier than 10/28/2018 at 1559 hours.

## 2018-10-26 NOTE — PLAN OF CARE
Problem: Patient Care Overview (Adult)  Goal: Plan of Care Review  Outcome: Ongoing (interventions implemented as appropriate)  Pt out on unit.Pt with improved mood and able to smile at times.Appitite good and showered this am.Participating in unit activities.Medication compliant.Remains interested in rehab.

## 2018-10-26 NOTE — PLAN OF CARE
Problem: Overarching Goals (Adult)  Goal: Develops/Participates in Therapeutic [place] to Support Successful Transition    Intervention: Foster Therapeutic [place]  Sat with patient to complete the form for placement with [facility]. Patient presents with depressed mood and affect. Patient reluctantly answered the questions and went back to bed.

## 2018-10-26 NOTE — PLAN OF CARE
Problem: Patient Care Overview (Adult)  Goal: Plan of Care Review  Outcome: Ongoing (interventions implemented as appropriate)  Pt.  Slept 7  Hours  so far this shift without problems noted. q 15 min safety checks done this shift. Safety and comfort maintained. Cont. Plan.

## 2018-10-26 NOTE — PROGRESS NOTES
"   10/26/18 1030   [facility] Group Therapy   Group Name Therapeutic Recreation   Specific Interventions Coping Skills Training   Participation Level Minimal   Participation Quality Cooperative   Insight/Motivation Limited   Affect/Mood Display Flat   Cognition Alert   Psychomotor WNL   Patient observed peers participate in activity, quiet unless approached. Patient states "I feel ok. Just want to watch."  "

## 2018-10-27 PROCEDURE — 25000003 PHARM REV CODE 250: Performed by: PSYCHIATRY & NEUROLOGY

## 2018-10-27 PROCEDURE — 99232 SBSQ HOSP IP/OBS MODERATE 35: CPT | Mod: ,,, | Performed by: PSYCHIATRY & NEUROLOGY

## 2018-10-27 PROCEDURE — 11400000 HC PSYCH PRIVATE ROOM

## 2018-10-27 PROCEDURE — S4991 NICOTINE PATCH NONLEGEND: HCPCS | Performed by: PSYCHIATRY & NEUROLOGY

## 2018-10-27 RX ADMIN — QUETIAPINE FUMARATE 200 MG: 200 TABLET ORAL at 08:10

## 2018-10-27 RX ADMIN — FERROUS SULFATE TAB 325 MG (65 MG ELEMENTAL FE) 325 MG: 325 (65 FE) TAB at 08:10

## 2018-10-27 RX ADMIN — HYDROXYZINE PAMOATE 50 MG: 50 CAPSULE ORAL at 03:10

## 2018-10-27 RX ADMIN — VENLAFAXINE HYDROCHLORIDE 112.5 MG: 75 CAPSULE, EXTENDED RELEASE ORAL at 08:10

## 2018-10-27 RX ADMIN — DIAZEPAM 2 MG: 2 TABLET ORAL at 08:10

## 2018-10-27 RX ADMIN — Medication 400 MG: at 08:10

## 2018-10-27 RX ADMIN — PANTOPRAZOLE SODIUM 40 MG: 40 TABLET, DELAYED RELEASE ORAL at 08:10

## 2018-10-27 RX ADMIN — HYDROXYZINE PAMOATE 50 MG: 50 CAPSULE ORAL at 07:10

## 2018-10-27 RX ADMIN — THERA TABS 1 TABLET: TAB at 08:10

## 2018-10-27 RX ADMIN — GABAPENTIN 700 MG: 400 CAPSULE ORAL at 02:10

## 2018-10-27 RX ADMIN — LEVOTHYROXINE SODIUM 75 MCG: 25 TABLET ORAL at 05:10

## 2018-10-27 RX ADMIN — NICOTINE 1 PATCH: 14 PATCH, EXTENDED RELEASE TRANSDERMAL at 08:10

## 2018-10-27 RX ADMIN — GABAPENTIN 700 MG: 400 CAPSULE ORAL at 08:10

## 2018-10-27 RX ADMIN — Medication 1 TABLET: at 08:10

## 2018-10-27 RX ADMIN — QUETIAPINE FUMARATE 75 MG: 25 TABLET ORAL at 08:10

## 2018-10-27 NOTE — PROGRESS NOTES
"PSYCHIATRY DAILY INPATIENT PROGRESS NOTE  SUBSEQUENT HOSPITAL VISIT    ENCOUNTER DATE: 10/27/2018  SITE: [facility]    DATE OF ADMISSION: 10/19/2018  7:46 PM  LENGTH OF STAY: 8 days    HISTORY    CHIEF COMPLAINT   Jo-Ann Wray is a 53 y.o. female, seen during daily pearson rounds on the inpatient unit.  Jo-Ann Wray presents with the chief complaint of  suicidal ideation, "I wanted to hurt myself."    HPI   (Elements: Location, Quality, Severity, Duration, Timing, Content, Modifying Factors, Associated Signs & Symptoms)    The patient was seen and examined. The chart was reviewed. Reviewed notes by MD and CTRS from the last 24 hours.     Staff reports no behavioral or management issues.   The patient has been compliant with treatment. The patient denied any side effects.    Patients withdrawal has subsided.  Her mood is improved.  She is future oriented toward rehab.      She mentioned her sister being murdered today.  She found this out last year in a psychiatric hospital.     Improved Symptoms of Depression: less diminished mood, improved loss of interest/anhedonia; less irritability, less diminished energy, no change in sleep, less change in appetite--eating better, +diminished concentration or cognition or indecisiveness, less PMA/R, less excessive guilt or hopelessness or worthlessness, no suicidal ideations     Improved Changes in Sleep: No problems with initiation or maintenance, no early morning awakening with inability to return to sleep; no hypersomnolence     Improved / Resolved Suicidal/(no)Homicidal ideations: no active/passive deations, no organized plans, no future intentions. She is more future oriented towards going to rehab      No symptoms of psychosis or paula     Continued but less Symptoms of CAROL: less excessive anxiety/worry/fear, +difficult to control, with less restlessness, fatigue, poor concentration, irritability, muscle tension, and sleep disturbance    Controlled " Symptoms of Panic Disorder: no panic attacks since admission;  with agoraphobia     Continued but less Symptoms of PTSD: +h/o trauma; + re-experiencing/intrusive symptoms, +avoidant behavior,+negative alterations in cognition or mood, or +hyperarousal symptoms; without dissociative symptoms     +Substance Use: Endorses intoxication, withdrawal, tolerance, used in larger amounts or duration than intended, unsuccessful attempts to limit or quit, increased time engaging in or seeking out, cravings or strong desire to use, failure to fulfill obligations, negative consequences in social/interpersonal/occupational,/recreational areas, use in dangerous situations, and medical or psychological consequences     Improved cocaine withdrawal  -Alcohol Withdrawal is currently controlled and improving with valium taper.   -She wants to attend rehab post discharge, ideally in [place].    ROS  General ROS: negative  Ophthalmic ROS: negative  ENT ROS: negative  Allergy and Immunology ROS: negative  Hematological and Lymphatic ROS: negative  Endocrine ROS: negative  Respiratory ROS: no cough, shortness of breath, or wheezing  Cardiovascular ROS: no chest pain or dyspnea on exertion  Gastrointestinal ROS: no abdominal pain, change in bowel habits, or black or bloody stools  Genito-Urinary ROS: no dysuria, trouble voiding, or hematuria  Musculoskeletal ROS: negative  Neurological ROS: no TIA or stroke symptoms  Dermatological ROS: negative    PAST MEDICAL HISTORY   Past Medical History:   Diagnosis Date    Addiction to drug     Alcohol abuse     Anxiety     Asthma     Bipolar 1 disorder     COPD (chronic obstructive pulmonary disease)     Depression     Fatigue     History of psychiatric hospitalization     Hx of psychiatric care     Hypothyroid     Psychiatric problem     Depression, Anxiety, Suicidal Ideations    Psychosis     hx of drug induced psychosis    Sleep difficulties     Suicide attempt     2005, 2013 -  "OD on "medications"    Therapy     [facility]    Withdrawal symptoms, alcohol     Withdrawal symptoms, drug or narcotic          PSYCHOTROPIC MEDICATIONS   Scheduled Meds:   diazePAM  2 mg Oral QHS    ferrous sulfate  325 mg Oral BID    folic acid-vit B6-vit B12 2.5-25-2 mg  1 tablet Oral Daily    gabapentin  700 mg Oral TID    levothyroxine  75 mcg Oral Before breakfast    magnesium oxide  400 mg Oral QHS    multivitamin  1 tablet Oral Daily    nicotine  1 patch Transdermal Daily    pantoprazole  40 mg Oral Daily    QUEtiapine  200 mg Oral QHS    QUEtiapine  75 mg Oral Daily    venlafaxine  112.5 mg Oral Daily     Continuous Infusions:  PRN Meds:.acetaminophen, albuterol, aluminum-magnesium hydroxide-simethicone, docusate sodium, hydrOXYzine pamoate, loperamide, OLANZapine **AND** OLANZapine        EXAMINATION    VITALS   Vitals:    10/27/18 0841   BP: (!) 111/56   Pulse: 72   Resp: 18   Temp: 97.1 °F (36.2 °C)     Body mass index is 30 kg/m².      CONSTITUTIONAL  General Appearance: WF, in casual garb; NAD     MUSCULOSKELETAL  Muscle Strength and Tone:  normal  Abnormal Involuntary Movements:  none  Gait and Station:  normal; non-ataxic     PSYCHIATRIC   Level of Consciousness: awake, alert  Orientation: p/p/t/s  Grooming:  adequate to circumstances  Psychomotor Behavior: less PMR  Speech: nl r/t/v/s  Language:  English fluent  Mood: "fine"  Affect: improved range, somewhat anxious, no longer dysthymic  Thought Process:  linear and organized  Associations:  intact; no BARBARA  Thought Content:  denied AVH/delusions; denied HI, no SI  Memory:  intact to recent and remote events  Attention:  intact to conversation; not distractible   Fund of Knowledge:  age and education appropriate  Estimate if Intelligence:  average based on work/education history, vocabulary and mental status exam  Insight:  good- seeks help  Judgment:   good- no bx issues, compliant and " cooperative      DIAGNOSTIC TESTING   Laboratory Results  No results found for this or any previous visit (from the past 24 hour(s)).      ASSESSMENT      IMPRESSION   Unspecified Mood Disorder  Unspecified Anxiety Disorder  PTSD     Alcohol Use Disorder, severe, continuous, with physiological dependence  Cocaine Use Disorder, severe, continuous, with physiological dependence  Nicotine Dependence     Alcohol Withdrawal  Cocaine Withdrawal     Psychosocial stressors     Hypothyroidism  COPD/Asthma  Chronic Neuropathic pain  GERD     Pre-diabetes  Iron deficient Microcytic Anemia  B12 insuffiencey      MEDICAL DECISION MAKING          PROBLEM LIST AND MANAGEMENT PLANS; PRESCRIPTION DRUG MANAGEMENT  Compliance: yes  Side Effects: no  Regimen Adjustments:     Mood: Continue Seroquel to 75 mg po q AM and 200 mg po q HS     Anxiety/PTSD: Continue Effexor XR to 112.5 mg po q day; consider trial of prazosin; gabapentin as below; seroquel as above     Alcohol Use/Cocaine Use: pt counseled; rehab once stable     Nicotine Dependence: pt counseled; nicotine patch daily     Withdrawal: Continue Valium to 2 mg po q HS- will taper off as able     Psychosocial stressors: pt counseled; SW assisting with resources     Hypothyroidism: Continue Synthroid 75 mcg po q AM     COPD/Asthma: FM consulted; currently asymptomatic; Albuterol inhaler 2 puffs q 6 hours prn SOB/wheezing     Neuropathy/Chronic Pain: Increase Gabapentin to 700 mg po TID; B12 deficiency likely was contributing- treatment as below     GERD: Continue Protonix 40 mg po q day     Prediabetes: rechecked CMP- levels normalized; HgA1c was elevated in the prediabetes range; likely increased from substance use, diet and lifestyle; pt counseled on exercise and diet      Iron deficient Anemia: Continue Iron 325 mg po bid    B12 deficiency: pt declined B12 IM repletion; Continue Folbic Po q day     DIAGNOSTIC TESTING  Labs reviewed; follow up pending labs     Disposition:  -[facility]  to assist with aftercare planning and collateral  -Once stable discharge home with outpatient follow up care and/or rehab  -Continue inpatient treatment under a PEC and/or CEC for danger to self and grave disability as evident by depression and anxiety with SI and heavy addiction/sustance use requiring inpatient detox and psychiatric stabilization.         NEED FOR CONTINUED HOSPITALIZATION  Psychiatric illness continues to pose a potential threat to life or bodily function, of self or others, thereby requiring the need for continued inpatient psychiatric hospitalization: Yes    Protective inpatient pyschiatric hospitalization required while a safe disposition plan is enacted: Yes    Patient stabilized and ready for discharge from inpatient psychiatric unit: No      STAFF:   Charles Tellez MD  Psychiatry

## 2018-10-27 NOTE — PLAN OF CARE
"Problem: Overarching Goals (Adult)  Goal: Develops/Participates in Therapeutic [place] to Support Successful Transition    Intervention: Foster Therapeutic [place]  Group Note:    Behavior:  Patient depressed, isolative to assigned room and initially refused to attend group therapy. The LMSW attempted to further explore patient's reason for not wanting to attend group. She reported feeling depressed over the death of her sister (2 years ago) and that she has "not dealt with it'. She also stated she has anxiety and wants her "Klonopin" . She stated "I have too much on my plate", reported feeling overwhelmed especially about "bills". The LMSW encouraged patient to seek counseling services in order to further process her unresolved grief surrounding her sister's death, as well as make future attempts to prioritize her responsibilities and work on getting herself "well" first via B[facility] stay and inpatient rehab. Patient nodded her head.     She later arrived for group. She sat quietly and did not participate.      Intervention:  After establishing group rules, the LMSW implemented a Motivational Interviewing based group therapy session with materials and handouts prescribed in the Group Treatment for Substance Abuse, second edition, A Stages- of- Change Therapy Manual. The Materials used were from the Action/Maintenance session discussing wellness (pp. 260-265).        Response:  Patient did not verbally participate in group discussion. She sat quietly with a somber expression on her face.           Plan:  Encourage future participation in therapeutic activities.                     "

## 2018-10-27 NOTE — PLAN OF CARE
Problem: Patient Care Overview (Adult)  Goal: Plan of Care Review  Outcome: Ongoing (interventions implemented as appropriate)  Pt is calm and cooperative.  No longer endorses S/I.  Affect remains flat/restricted but reports improved mood.  Pt planning to discharge to rehab next week.  Encouraged pt to continue following treatment plan towards discharge, understanding verbalized.  Will continue to monitor.

## 2018-10-28 PROCEDURE — 25000003 PHARM REV CODE 250: Performed by: PSYCHIATRY & NEUROLOGY

## 2018-10-28 PROCEDURE — 11400000 HC PSYCH PRIVATE ROOM

## 2018-10-28 PROCEDURE — S4991 NICOTINE PATCH NONLEGEND: HCPCS | Performed by: PSYCHIATRY & NEUROLOGY

## 2018-10-28 PROCEDURE — 99232 SBSQ HOSP IP/OBS MODERATE 35: CPT | Mod: ,,, | Performed by: PSYCHIATRY & NEUROLOGY

## 2018-10-28 RX ORDER — VENLAFAXINE HYDROCHLORIDE 150 MG/1
150 CAPSULE, EXTENDED RELEASE ORAL DAILY
Status: DISCONTINUED | OUTPATIENT
Start: 2018-10-29 | End: 2018-10-31 | Stop reason: HOSPADM

## 2018-10-28 RX ADMIN — HYDROXYZINE PAMOATE 50 MG: 50 CAPSULE ORAL at 08:10

## 2018-10-28 RX ADMIN — LEVOTHYROXINE SODIUM 75 MCG: 25 TABLET ORAL at 06:10

## 2018-10-28 RX ADMIN — QUETIAPINE FUMARATE 75 MG: 25 TABLET ORAL at 08:10

## 2018-10-28 RX ADMIN — FERROUS SULFATE TAB 325 MG (65 MG ELEMENTAL FE) 325 MG: 325 (65 FE) TAB at 08:10

## 2018-10-28 RX ADMIN — THERA TABS 1 TABLET: TAB at 08:10

## 2018-10-28 RX ADMIN — DIAZEPAM 2 MG: 2 TABLET ORAL at 08:10

## 2018-10-28 RX ADMIN — GABAPENTIN 700 MG: 400 CAPSULE ORAL at 02:10

## 2018-10-28 RX ADMIN — QUETIAPINE FUMARATE 200 MG: 200 TABLET ORAL at 08:10

## 2018-10-28 RX ADMIN — NICOTINE 1 PATCH: 14 PATCH, EXTENDED RELEASE TRANSDERMAL at 08:10

## 2018-10-28 RX ADMIN — VENLAFAXINE HYDROCHLORIDE 112.5 MG: 75 CAPSULE, EXTENDED RELEASE ORAL at 08:10

## 2018-10-28 RX ADMIN — Medication 1 TABLET: at 08:10

## 2018-10-28 RX ADMIN — GABAPENTIN 700 MG: 400 CAPSULE ORAL at 08:10

## 2018-10-28 RX ADMIN — Medication 400 MG: at 08:10

## 2018-10-28 RX ADMIN — PANTOPRAZOLE SODIUM 40 MG: 40 TABLET, DELAYED RELEASE ORAL at 08:10

## 2018-10-28 RX ADMIN — HYDROXYZINE PAMOATE 50 MG: 50 CAPSULE ORAL at 01:10

## 2018-10-28 NOTE — PROGRESS NOTES
"PSYCHIATRY DAILY INPATIENT PROGRESS NOTE  SUBSEQUENT HOSPITAL VISIT    ENCOUNTER DATE: 10/28/2018  SITE: [facility]    DATE OF ADMISSION: 10/19/2018  7:46 PM  LENGTH OF STAY: 9 days    HISTORY    CHIEF COMPLAINT   Jo-Ann Wray is a 53 y.o. female, seen during daily pearson rounds on the inpatient unit.  Jo-Ann Wray presents with the chief complaint of  suicidal ideation, "I wanted to hurt myself."    HPI   (Elements: Location, Quality, Severity, Duration, Timing, Content, Modifying Factors, Associated Signs & Symptoms)    The patient was seen and examined. The chart was reviewed. Reviewed notes by MD and CTRS from the last 24 hours.     Staff reports no behavioral or management issues.   The patient has been compliant with treatment. The patient denied any side effects.    Patients withdrawal has subsided.  She continues to be very anxious with flat affect.      Improved Symptoms of Depression: less diminished mood, improved loss of interest/anhedonia; less irritability, less diminished energy, no change in sleep, less change in appetite--eating better, +diminished concentration or cognition or indecisiveness, less PMA/R, less excessive guilt or hopelessness or worthlessness, no suicidal ideations     Improved Changes in Sleep: No problems with initiation or maintenance, no early morning awakening with inability to return to sleep; no hypersomnolence     Improved / Resolved Suicidal/(no)Homicidal ideations: no active/passive deations, no organized plans, no future intentions. She is more future oriented towards going to rehab      No symptoms of psychosis or paula     Continued Symptoms of CAROL: + excessive anxiety/worry/fear, +difficult to control, with less restlessness, fatigue, poor concentration, irritability, muscle tension, and sleep disturbance    She requests xanax and is frequently using vistaril    Controlled Symptoms of Panic Disorder: no panic attacks since admission;  with " "agoraphobia     Continued but less Symptoms of PTSD: +h/o trauma; + re-experiencing/intrusive symptoms, +avoidant behavior,+negative alterations in cognition or mood, or +hyperarousal symptoms; without dissociative symptoms     +Substance Use: Endorses intoxication, withdrawal, tolerance, used in larger amounts or duration than intended, unsuccessful attempts to limit or quit, increased time engaging in or seeking out, cravings or strong desire to use, failure to fulfill obligations, negative consequences in social/interpersonal/occupational,/recreational areas, use in dangerous situations, and medical or psychological consequences     Improved cocaine withdrawal  -Alcohol Withdrawal is currently controlled and resolved  -She wants to attend rehab post discharge, ideally in [place].    ROS  General ROS: negative  Ophthalmic ROS: negative  ENT ROS: negative  Allergy and Immunology ROS: negative  Hematological and Lymphatic ROS: negative  Endocrine ROS: negative  Respiratory ROS: no cough, shortness of breath, or wheezing  Cardiovascular ROS: no chest pain or dyspnea on exertion  Gastrointestinal ROS: no abdominal pain, change in bowel habits, or black or bloody stools  Genito-Urinary ROS: no dysuria, trouble voiding, or hematuria  Musculoskeletal ROS: negative  Neurological ROS: no TIA or stroke symptoms  Dermatological ROS: negative    PAST MEDICAL HISTORY   Past Medical History:   Diagnosis Date    Addiction to drug     Alcohol abuse     Anxiety     Asthma     Bipolar 1 disorder     COPD (chronic obstructive pulmonary disease)     Depression     Fatigue     History of psychiatric hospitalization     Hx of psychiatric care     Hypothyroid     Psychiatric problem     Depression, Anxiety, Suicidal Ideations    Psychosis     hx of drug induced psychosis    Sleep difficulties     Suicide attempt     2005, 2013 - OD on "medications"    Therapy     [facility]    " "Withdrawal symptoms, alcohol     Withdrawal symptoms, drug or narcotic          PSYCHOTROPIC MEDICATIONS   Scheduled Meds:   diazePAM  2 mg Oral QHS    ferrous sulfate  325 mg Oral BID    folic acid-vit B6-vit B12 2.5-25-2 mg  1 tablet Oral Daily    gabapentin  700 mg Oral TID    levothyroxine  75 mcg Oral Before breakfast    magnesium oxide  400 mg Oral QHS    multivitamin  1 tablet Oral Daily    nicotine  1 patch Transdermal Daily    pantoprazole  40 mg Oral Daily    QUEtiapine  200 mg Oral QHS    QUEtiapine  75 mg Oral Daily    [START ON 10/29/2018] venlafaxine  150 mg Oral Daily     Continuous Infusions:  PRN Meds:.acetaminophen, albuterol, aluminum-magnesium hydroxide-simethicone, docusate sodium, hydrOXYzine pamoate, loperamide, OLANZapine **AND** OLANZapine        EXAMINATION    VITALS   Vitals:    10/28/18 0729   BP: (!) 107/57   Pulse: 65   Resp: 18   Temp: 97.9 °F (36.6 °C)     Body mass index is 30 kg/m².      CONSTITUTIONAL  General Appearance: WF, in casual garb; NAD     MUSCULOSKELETAL  Muscle Strength and Tone:  normal  Abnormal Involuntary Movements:  none  Gait and Station:  normal; non-ataxic     PSYCHIATRIC   Level of Consciousness: awake, alert  Orientation: p/p/t/s  Grooming:  adequate to circumstances  Psychomotor Behavior: less PMR  Speech: nl r/t/v/s  Language:  English fluent  Mood: "nervous"  Affect: improved range, + anxious, no longer dysthymic  Thought Process:  linear and organized  Associations:  intact; no BARBARA  Thought Content:  denied AVH/delusions; denied HI, no SI  Memory:  intact to recent and remote events  Attention:  intact to conversation; not distractible   Fund of Knowledge:  age and education appropriate  Estimate if Intelligence:  average based on work/education history, vocabulary and mental status exam  Insight:  good- seeks help  Judgment:   good- no bx issues, compliant and cooperative      DIAGNOSTIC TESTING   Laboratory Results  No results found for this " or any previous visit (from the past 24 hour(s)).      ASSESSMENT      IMPRESSION   Unspecified Mood Disorder  Unspecified Anxiety Disorder  PTSD     Alcohol Use Disorder, severe, continuous, with physiological dependence  Cocaine Use Disorder, severe, continuous, with physiological dependence  Nicotine Dependence     Alcohol Withdrawal  Cocaine Withdrawal     Psychosocial stressors     Hypothyroidism  COPD/Asthma  Chronic Neuropathic pain  GERD     Pre-diabetes  Iron deficient Microcytic Anemia  B12 insuffiencey      MEDICAL DECISION MAKING          PROBLEM LIST AND MANAGEMENT PLANS; PRESCRIPTION DRUG MANAGEMENT  Compliance: yes  Side Effects: no  Regimen Adjustments:     Mood: Continue Seroquel to 75 mg po q AM and 200 mg po q HS     Anxiety/PTSD: Increase Effexor XR to 150 mg po q day; consider trial of prazosin; gabapentin as below; seroquel as above     Alcohol Use/Cocaine Use: pt counseled; rehab once stable     Nicotine Dependence: pt counseled; nicotine patch daily     Withdrawal: Continue Valium to 2 mg po q HS- will taper off as able     Psychosocial stressors: pt counseled; SW assisting with resources     Hypothyroidism: Continue Synthroid 75 mcg po q AM     COPD/Asthma: FM consulted; currently asymptomatic; Albuterol inhaler 2 puffs q 6 hours prn SOB/wheezing     Neuropathy/Chronic Pain: Increase Gabapentin to 700 mg po TID; B12 deficiency likely was contributing- treatment as below     GERD: Continue Protonix 40 mg po q day     Prediabetes: rechecked CMP- levels normalized; HgA1c was elevated in the prediabetes range; likely increased from substance use, diet and lifestyle; pt counseled on exercise and diet      Iron deficient Anemia: Continue Iron 325 mg po bid    B12 deficiency: pt declined B12 IM repletion; Continue Folbic Po q day     DIAGNOSTIC TESTING  Labs reviewed; follow up pending labs     Disposition:  -SW to assist with aftercare planning and collateral  -Once stable discharge home with  outpatient follow up care and/or rehab  -Continue inpatient treatment under a PEC and/or CEC for danger to self and grave disability as evident by depression and anxiety with SI and heavy addiction/sustance use requiring inpatient detox and psychiatric stabilization.         NEED FOR CONTINUED HOSPITALIZATION  Psychiatric illness continues to pose a potential threat to life or bodily function, of self or others, thereby requiring the need for continued inpatient psychiatric hospitalization: Yes    Protective inpatient pyschiatric hospitalization required while a safe disposition plan is enacted: Yes    Patient stabilized and ready for discharge from inpatient psychiatric unit: No      STAFF:   Charles Tellez MD  Psychiatry

## 2018-10-28 NOTE — PLAN OF CARE
"Problem: Overarching Goals (Adult)  Goal: Optimized Coping Skills in Response to Life Stressors    Intervention: Promote Effective Coping Strategies  Group Note    Behavior:    Patient attended group psychotherapy today. Patient could not be prompted to participate in the session today; said she would prefer to just "observe."       Intervention:    Patients participated in a session focusing on internal and external coping skills and how to address the warning signs when a crisis is approaching; the relevance of safety planning was reviewed.       Response:    Patient, at the end of the session stated that she was attentive; her statement was acknowledged.         Plan:        Patient will be encourage to continue to attend group psychotherapy in the future with future emphasis on safety planning.         "

## 2018-10-28 NOTE — PLAN OF CARE
Problem: Patient Care Overview (Adult)  Goal: Plan of Care Review  Outcome: Ongoing (interventions implemented as appropriate)  Pt calm and cooperative, denies any S/I at this time.  Pt scheduled to discharge next week to rehab, encouraged continued compliance.  Pt agreed, will continue to monitor.

## 2018-10-28 NOTE — PLAN OF CARE
Problem: Patient Care Overview (Adult)  Goal: Plan of Care Review  Outcome: Ongoing (interventions implemented as appropriate)  Spending time in the dayroom watching TV.  Quiet, calm, withdrawn, cooperative.  Isolates to self.  Does not initiate conversation but will speak when spoken to.  Flat affect.  Pt denies SI/HI and stated that she is feeling very depressed this evening and just wanted to take her medicine and go to bed.  Pt took PM medication at 2000 and promptly went to assigned bed.  All precautions maintained.

## 2018-10-29 LAB — TB INDURATION 48 - 72 HR READ: 0 MM

## 2018-10-29 PROCEDURE — 11400000 HC PSYCH PRIVATE ROOM

## 2018-10-29 PROCEDURE — 25000003 PHARM REV CODE 250: Performed by: PSYCHIATRY & NEUROLOGY

## 2018-10-29 PROCEDURE — S4991 NICOTINE PATCH NONLEGEND: HCPCS | Performed by: PSYCHIATRY & NEUROLOGY

## 2018-10-29 PROCEDURE — 99233 SBSQ HOSP IP/OBS HIGH 50: CPT | Mod: ,,, | Performed by: PSYCHIATRY & NEUROLOGY

## 2018-10-29 RX ORDER — BUSPIRONE HYDROCHLORIDE 5 MG/1
5 TABLET ORAL 2 TIMES DAILY
Status: DISCONTINUED | OUTPATIENT
Start: 2018-10-29 | End: 2018-10-31 | Stop reason: HOSPADM

## 2018-10-29 RX ORDER — GABAPENTIN 400 MG/1
800 CAPSULE ORAL 3 TIMES DAILY
Status: DISCONTINUED | OUTPATIENT
Start: 2018-10-29 | End: 2018-10-31 | Stop reason: HOSPADM

## 2018-10-29 RX ORDER — QUETIAPINE FUMARATE 100 MG/1
100 TABLET, FILM COATED ORAL DAILY
Status: DISCONTINUED | OUTPATIENT
Start: 2018-10-30 | End: 2018-10-31 | Stop reason: HOSPADM

## 2018-10-29 RX ADMIN — QUETIAPINE FUMARATE 200 MG: 200 TABLET ORAL at 08:10

## 2018-10-29 RX ADMIN — Medication 400 MG: at 08:10

## 2018-10-29 RX ADMIN — VENLAFAXINE HYDROCHLORIDE 150 MG: 150 CAPSULE, EXTENDED RELEASE ORAL at 08:10

## 2018-10-29 RX ADMIN — BUSPIRONE HYDROCHLORIDE 5 MG: 5 TABLET ORAL at 11:10

## 2018-10-29 RX ADMIN — PANTOPRAZOLE SODIUM 40 MG: 40 TABLET, DELAYED RELEASE ORAL at 08:10

## 2018-10-29 RX ADMIN — GABAPENTIN 800 MG: 400 CAPSULE ORAL at 08:10

## 2018-10-29 RX ADMIN — GABAPENTIN 800 MG: 400 CAPSULE ORAL at 03:10

## 2018-10-29 RX ADMIN — ACETAMINOPHEN 650 MG: 325 TABLET ORAL at 05:10

## 2018-10-29 RX ADMIN — FERROUS SULFATE TAB 325 MG (65 MG ELEMENTAL FE) 325 MG: 325 (65 FE) TAB at 08:10

## 2018-10-29 RX ADMIN — HYDROXYZINE PAMOATE 50 MG: 50 CAPSULE ORAL at 01:10

## 2018-10-29 RX ADMIN — GABAPENTIN 700 MG: 400 CAPSULE ORAL at 08:10

## 2018-10-29 RX ADMIN — Medication 1 TABLET: at 08:10

## 2018-10-29 RX ADMIN — DIAZEPAM 2 MG: 2 TABLET ORAL at 08:10

## 2018-10-29 RX ADMIN — BUSPIRONE HYDROCHLORIDE 5 MG: 5 TABLET ORAL at 08:10

## 2018-10-29 RX ADMIN — HYDROXYZINE PAMOATE 50 MG: 50 CAPSULE ORAL at 07:10

## 2018-10-29 RX ADMIN — LEVOTHYROXINE SODIUM 75 MCG: 25 TABLET ORAL at 05:10

## 2018-10-29 RX ADMIN — THERA TABS 1 TABLET: TAB at 08:10

## 2018-10-29 RX ADMIN — NICOTINE 1 PATCH: 14 PATCH, EXTENDED RELEASE TRANSDERMAL at 08:10

## 2018-10-29 RX ADMIN — QUETIAPINE FUMARATE 75 MG: 25 TABLET ORAL at 08:10

## 2018-10-29 NOTE — PLAN OF CARE
Problem: Patient Care Overview (Adult)  Goal: Plan of Care Review  Outcome: Ongoing (interventions implemented as appropriate)  Pt out on unit.Pt sleeping adequate amount.Appetite good and eating 100%.Mood remains depressed.Minimal participation in unit activities.Medication compliant.

## 2018-10-29 NOTE — PLAN OF CARE
Problem: Patient Care Overview (Adult)  Goal: Plan of Care Review  Outcome: Ongoing (interventions implemented as appropriate)  Pt up and about the unit.  Spent the early evening in the dayroom watching TV.  Remains sad, depressed, withdrawn with flat affect.  Little interaction with peers.  Calm and cooperative.  Pt stated that she is feeling somewhat better than she did yesterday evening.  Pt even smiled a little at this writer but did not continue the conversation.  Denies SI/HI.  Encouraged to ask questions and verbalized feelings and concerns.  Pt took PM meds about 2000 and went to assigned bed where she is at this time.  All precautions maintained.

## 2018-10-29 NOTE — PROGRESS NOTES
"PSYCHIATRY DAILY INPATIENT PROGRESS NOTE  SUBSEQUENT HOSPITAL VISIT    ENCOUNTER DATE: 10/29/2018  SITE: [facility]    DATE OF ADMISSION: 10/19/2018  7:46 PM  LENGTH OF STAY: 10 days    HISTORY    CHIEF COMPLAINT   Jo-Ann Wray is a 53 y.o. female, seen during daily pearson rounds on the inpatient unit.  Jo-Ann Wray presents with the chief complaint of  suicidal ideation, "I wanted to hurt myself."    HPI   (Elements: Location, Quality, Severity, Duration, Timing, Content, Modifying Factors, Associated Signs & Symptoms)    The patient was seen and examined. The chart was reviewed. Reviewed notes by MD and CTRS from the last 24 hours.     Staff reports no behavioral or management issues.     The patient has been compliant with treatment. The patient denied any side effects.    Patients withdrawal have subsided and improved.  She continues to be anxious and depressedwith flat affect- mild improvements are occuring.      Improving Symptoms of Depression: less diminished mood, improved loss of interest/anhedonia; less irritability, less diminished energy, no change in sleep, less change in appetite--eating better, +diminished concentration or cognition or indecisiveness, less PMA/R, less excessive guilt or hopelessness or worthlessness, no suicidal ideations     Improved Changes in Sleep: No problems with initiation or maintenance, no early morning awakening with inability to return to sleep; no hypersomnolence     Improved / Resolved Suicidal/(no)Homicidal ideations: no active/passive deations, no organized plans, no future intentions. She is more future oriented towards going to rehab      No symptoms of psychosis or paula     Continued but less Symptoms of CAROL: + excessive anxiety/worry/fear, +difficult to control, with less restlessness, fatigue, poor concentration, irritability, muscle tension, and sleep disturbance; she recieves vistaril; prn regularly    Controlled Symptoms of Panic Disorder: " no panic attacks since admission;  with agoraphobia     Continued but less Symptoms of PTSD: +h/o trauma; less re-experiencing/intrusive symptoms, avoidant behavior, negative alterations in cognition or mood, and hyperarousal symptoms; without dissociative symptoms     +Substance Use: Endorses intoxication, withdrawal, tolerance, used in larger amounts or duration than intended, unsuccessful attempts to limit or quit, increased time engaging in or seeking out, cravings or strong desire to use, failure to fulfill obligations, negative consequences in social/interpersonal/occupational,/recreational areas, use in dangerous situations, and medical or psychological consequences     Improved cocaine withdrawal  -Alcohol Withdrawal is currently controlled and resolved  -She wants to attend rehab post discharge, ideally in [place]. Applications and acceptance are pending    ROS  General ROS: negative  Ophthalmic ROS: negative  ENT ROS: negative  Allergy and Immunology ROS: negative  Hematological and Lymphatic ROS: negative  Endocrine ROS: negative  Respiratory ROS: no cough, shortness of breath, or wheezing  Cardiovascular ROS: no chest pain or dyspnea on exertion  Gastrointestinal ROS: no abdominal pain, change in bowel habits, or black or bloody stools  Genito-Urinary ROS: no dysuria, trouble voiding, or hematuria  Musculoskeletal ROS: negative  Neurological ROS: no TIA or stroke symptoms  Dermatological ROS: negative    PAST MEDICAL HISTORY   Past Medical History:   Diagnosis Date    Addiction to drug     Alcohol abuse     Anxiety     Asthma     Bipolar 1 disorder     COPD (chronic obstructive pulmonary disease)     Depression     Fatigue     History of psychiatric hospitalization     Hx of psychiatric care     Hypothyroid     Psychiatric problem     Depression, Anxiety, Suicidal Ideations    Psychosis     hx of drug induced psychosis    Sleep difficulties     Suicide attempt     2005, 2013 - OD on  ""medications"    Therapy     [facility]    Withdrawal symptoms, alcohol     Withdrawal symptoms, drug or narcotic          PSYCHOTROPIC MEDICATIONS   Scheduled Meds:   diazePAM  2 mg Oral QHS    ferrous sulfate  325 mg Oral BID    folic acid-vit B6-vit B12 2.5-25-2 mg  1 tablet Oral Daily    gabapentin  700 mg Oral TID    levothyroxine  75 mcg Oral Before breakfast    magnesium oxide  400 mg Oral QHS    multivitamin  1 tablet Oral Daily    nicotine  1 patch Transdermal Daily    pantoprazole  40 mg Oral Daily    QUEtiapine  200 mg Oral QHS    QUEtiapine  75 mg Oral Daily    venlafaxine  150 mg Oral Daily     Continuous Infusions:  PRN Meds:.acetaminophen, albuterol, aluminum-magnesium hydroxide-simethicone, docusate sodium, hydrOXYzine pamoate, loperamide, OLANZapine **AND** OLANZapine        EXAMINATION    VITALS   Vitals:    10/29/18 0849   BP: (!) 110/59   Pulse: 72   Resp: 18   Temp: 98.4 °F (36.9 °C)     Body mass index is 30.91 kg/m².      CONSTITUTIONAL  General Appearance: WF, in casual garb; NAD     MUSCULOSKELETAL  Muscle Strength and Tone:  normal  Abnormal Involuntary Movements:  none  Gait and Station:  normal; non-ataxic     PSYCHIATRIC   Level of Consciousness: awake, alert  Orientation: p/p/t/s  Grooming:  adequate to circumstances  Psychomotor Behavior: less PMR, np PMA  Speech: nl r/t/v/s  Language:  English fluent  Mood: "anxious"  Affect: improved but still diminished range, + anxious, less dysthymic  Thought Process:  linear and organized  Associations:  intact; no BARBARA  Thought Content:  denied AVH/delusions; denied HI, no SI  Memory:  intact to recent and remote events  Attention:  intact to conversation; not distractible   Fund of Knowledge:  age and education appropriate  Estimate if Intelligence:  average based on work/education history, vocabulary and mental status exam  Insight:  good- seeks help  Judgment:   good- no bx issues, compliant and " cooperative      DIAGNOSTIC TESTING   Laboratory Results  No results found for this or any previous visit (from the past 24 hour(s)).      ASSESSMENT      IMPRESSION   Unspecified Mood Disorder  Unspecified Anxiety Disorder  PTSD     Alcohol Use Disorder, severe, continuous, with physiological dependence  Cocaine Use Disorder, severe, continuous, with physiological dependence  Nicotine Dependence     Alcohol Withdrawal  Cocaine Withdrawal     Psychosocial stressors     Hypothyroidism  COPD/Asthma  Chronic Neuropathic pain  GERD     Pre-diabetes  Iron deficient Microcytic Anemia  B12 insuffiencey      MEDICAL DECISION MAKING          PROBLEM LIST AND MANAGEMENT PLANS; PRESCRIPTION DRUG MANAGEMENT  Compliance: yes  Side Effects: no  Regimen Adjustments:     Mood: Increase Seroquel to 100 mg po q AM and 200 mg po q HS     Anxiety/PTSD: Continue Effexor XR to 150 mg po q day; consider trial of prazosin; gabapentin as below; seroquel as above; continue vistaril prn; start re-trial Buspar 5 mg po BID     Alcohol Use/Cocaine Use: pt counseled; rehab once stable     Nicotine Dependence: pt counseled; nicotine patch daily     Withdrawal: Continue Valium  2 mg po q HS- will taper off as able     Psychosocial stressors: pt counseled; SW assisting with resources     Hypothyroidism: Continue Synthroid 75 mcg po q AM     COPD/Asthma: FM consulted; currently asymptomatic; Albuterol inhaler 2 puffs q 6 hours prn SOB/wheezing     Neuropathy/Chronic Pain: Increase Gabapentin to 800 mg po TID; B12 deficiency likely was contributing- treatment as below     GERD: Continue Protonix 40 mg po q day     Prediabetes: rechecked CMP- levels normalized; HgA1c was elevated in the prediabetes range; likely increased from substance use, diet and lifestyle; pt counseled on exercise and diet      Iron deficient Anemia: Continue Iron 325 mg po bid    B12 deficiency: pt declined B12 IM repletion; Continue Folbic Po q day     DIAGNOSTIC TESTING  Labs  reviewed; follow up pending labs     Disposition:  -SW to assist with aftercare planning and collateral  -Once stable discharge home with outpatient follow up care and/or rehab if able  -Continue inpatient treatment under a PEC and/or CEC for danger to self and grave disability as evident by depression and anxiety with SI and heavy addiction/sustance use requiring inpatient detox and psychiatric stabilization.         NEED FOR CONTINUED HOSPITALIZATION  Psychiatric illness continues to pose a potential threat to life or bodily function, of self or others, thereby requiring the need for continued inpatient psychiatric hospitalization: Yes    Protective inpatient pyschiatric hospitalization required while a safe disposition plan is enacted: Yes    Patient stabilized and ready for discharge from inpatient psychiatric unit: No      STAFF:   Daniel Antunez MD  Psychiatry

## 2018-10-29 NOTE — PLAN OF CARE
Problem: Overarching Goals (Adult)  Goal: Optimized Coping Skills in Response to Life Stressors    Intervention: Promote Effective Coping Strategies  Group Note    Behavior:  Patient attended group, but left before the session was over. Patient did not participate in the activity or discussion.       Intervention:  Knowing Your Relapse Warning Signs/Taking Action to Reduce Setbacks - Patient completed and reviewed handout on recognizing warning signs, triggers to setbacks and what they can do to help reduce their chance of having a setback.        Plan:  Will continue to encourage patient participation in group.

## 2018-10-29 NOTE — PROGRESS NOTES
10/29/18 1030   [facility] Group Therapy   Group Name Leisure Education   Specific Interventions Coping Skills Training   Participation Level None   Patient was in group room, left to go to the bathroom, did not return. Patient hanging around the nurses station, moping(sad, frowning, quiet) around.

## 2018-10-30 PROCEDURE — 25000003 PHARM REV CODE 250: Performed by: PSYCHIATRY & NEUROLOGY

## 2018-10-30 PROCEDURE — 99233 SBSQ HOSP IP/OBS HIGH 50: CPT | Mod: ,,, | Performed by: PSYCHIATRY & NEUROLOGY

## 2018-10-30 PROCEDURE — S4991 NICOTINE PATCH NONLEGEND: HCPCS | Performed by: PSYCHIATRY & NEUROLOGY

## 2018-10-30 PROCEDURE — 90833 PSYTX W PT W E/M 30 MIN: CPT | Mod: ,,, | Performed by: PSYCHIATRY & NEUROLOGY

## 2018-10-30 PROCEDURE — 11400000 HC PSYCH PRIVATE ROOM

## 2018-10-30 RX ADMIN — QUETIAPINE FUMARATE 100 MG: 100 TABLET ORAL at 08:10

## 2018-10-30 RX ADMIN — BUSPIRONE HYDROCHLORIDE 5 MG: 5 TABLET ORAL at 08:10

## 2018-10-30 RX ADMIN — NICOTINE 1 PATCH: 14 PATCH, EXTENDED RELEASE TRANSDERMAL at 08:10

## 2018-10-30 RX ADMIN — Medication 400 MG: at 08:10

## 2018-10-30 RX ADMIN — PANTOPRAZOLE SODIUM 40 MG: 40 TABLET, DELAYED RELEASE ORAL at 08:10

## 2018-10-30 RX ADMIN — LEVOTHYROXINE SODIUM 75 MCG: 25 TABLET ORAL at 05:10

## 2018-10-30 RX ADMIN — THERA TABS 1 TABLET: TAB at 08:10

## 2018-10-30 RX ADMIN — GABAPENTIN 800 MG: 400 CAPSULE ORAL at 08:10

## 2018-10-30 RX ADMIN — Medication 1 TABLET: at 08:10

## 2018-10-30 RX ADMIN — FERROUS SULFATE TAB 325 MG (65 MG ELEMENTAL FE) 325 MG: 325 (65 FE) TAB at 08:10

## 2018-10-30 RX ADMIN — GABAPENTIN 800 MG: 400 CAPSULE ORAL at 02:10

## 2018-10-30 RX ADMIN — QUETIAPINE FUMARATE 200 MG: 200 TABLET ORAL at 08:10

## 2018-10-30 RX ADMIN — HYDROXYZINE PAMOATE 50 MG: 50 CAPSULE ORAL at 03:10

## 2018-10-30 RX ADMIN — HYDROXYZINE PAMOATE 50 MG: 50 CAPSULE ORAL at 04:10

## 2018-10-30 RX ADMIN — VENLAFAXINE HYDROCHLORIDE 150 MG: 150 CAPSULE, EXTENDED RELEASE ORAL at 08:10

## 2018-10-30 NOTE — PROGRESS NOTES
"   10/30/18 1030   [facility] Group Therapy   Group Name Leisure Education   Specific Interventions Coping Skills Training   Participation Level Minimal   Participation Quality Requires Prompting;Lack of Interest   Insight/Motivation Limited   Affect/Mood Display Flat   Cognition Alert   Psychomotor WNL   Patient sat unless directed into the activity. Patient encouraged to answer verbally. Patient observed peers participate and responded by shaking her head "no" when asked if she could state a benefit for a specific leisure activity.  "

## 2018-10-30 NOTE — PLAN OF CARE
Problem: Patient Care Overview (Adult)  Goal: Plan of Care Review  Outcome: Ongoing (interventions implemented as appropriate)  Pt out on unit.Pt with a little less sleep last night.Appetite good.Hygiene and grooming improved.Participating in unit activities.Mood remains depressed but no suicidal thoughts.Medication compliant.

## 2018-10-30 NOTE — PROGRESS NOTES
"PSYCHIATRY DAILY INPATIENT PROGRESS NOTE  SUBSEQUENT HOSPITAL VISIT    ENCOUNTER DATE: 10/30/2018  SITE: [facility]    DATE OF ADMISSION: 10/19/2018  7:46 PM  LENGTH OF STAY: 11 days    HISTORY    CHIEF COMPLAINT   Jo-Ann Wray is a 53 y.o. female, seen during daily pearson rounds on the inpatient unit.  Jo-Ann Wray presents with the chief complaint of  suicidal ideation, "I wanted to hurt myself."    HPI   (Elements: Location, Quality, Severity, Duration, Timing, Content, Modifying Factors, Associated Signs & Symptoms)    The patient was seen and examined. The chart was reviewed. Reviewed notes by RNs, CTRS, LPN, and LPC from the last 24 hours.     Staff reports no behavioral or management issues.     The patient has been compliant with treatment. The patient denied any side effects.    Patients withdrawal have subsided and remain improved- she is tolerating the valium detox well.  She is less anxious and depressed with and improving affect.      Improving Symptoms of Depression: less diminished mood, improved loss of interest/anhedonia; no irritability, less diminished energy, no change in sleep, less change in appetite--eating better, less diminished concentration or cognition or indecisiveness, less PMA/R, less excessive guilt or hopelessness or worthlessness, no suicidal ideations     Improved Changes in Sleep: No problems with initiation or maintenance, no early morning awakening with inability to return to sleep; no hypersomnolence     Improved / Resolved Suicidal/(no)Homicidal ideations: no active/passive deations, no organized plans, no future intentions. She is more future oriented towards going to rehab      No symptoms of psychosis or paula     Improving Symptoms of CAROL: less excessive anxiety/worry/fear,  with less restlessness, fatigue, poor concentration, irritability, muscle tension, and sleep disturbance; she recieves vistaril prn regularly    Controlled Symptoms of Panic " Disorder: no panic attacks since admission;  with agoraphobia     Improving Symptoms of PTSD: +h/o trauma; less re-experiencing/intrusive symptoms, avoidant behavior, negative alterations in cognition or mood, and hyperarousal symptoms; without dissociative symptoms     +Substance Use: Endorses intoxication, withdrawal, tolerance, used in larger amounts or duration than intended, unsuccessful attempts to limit or quit, increased time engaging in or seeking out, cravings or strong desire to use, failure to fulfill obligations, negative consequences in social/interpersonal/occupational,/recreational areas, use in dangerous situations, and medical or psychological consequences     Improved cocaine withdrawal  -Alcohol Withdrawal is currently controlled and resolved  -She wants to attend rehab or outpatient treatment post discharge, ideally in [place]. -Applications and acceptance are still pending    PSYCHOTHERAPY ADD-ON +[id number]   30 (16-37*) minutes      Time: 16 minutes  Participants: Met with patient    Therapeutic Intervention Type: insight oriented psychotherapy, behavior modifying psychotherapy, supportive psychotherapy  Why chosen therapy is appropriate versus another modality: relevant to diagnosis, patient responds to this modality, evidence based practice    Target symptoms: alcohol abuse, depression, anxiety , substance abuse  Primary focus: alcohol use, substance use, psychosocial stressors  Psychotherapeutic techniques:  supporitve, behavioral and motivational techniques; psycho-education; managing triggers for relapse    Outcome monitoring methods: self-report, observation    Patient's response to intervention:  The patient's response to intervention is accepting.    Progress toward goals:  The patient's progress toward goals is fair .          ROS  General ROS: negative  Ophthalmic ROS: negative  ENT ROS: negative  Allergy and Immunology ROS: negative  Hematological and Lymphatic ROS:  "negative  Endocrine ROS: negative  Respiratory ROS: no cough, shortness of breath, or wheezing  Cardiovascular ROS: no chest pain or dyspnea on exertion  Gastrointestinal ROS: no abdominal pain, change in bowel habits, or black or bloody stools  Genito-Urinary ROS: no dysuria, trouble voiding, or hematuria  Musculoskeletal ROS: negative  Neurological ROS: no TIA or stroke symptoms  Dermatological ROS: negative    PAST MEDICAL HISTORY   Past Medical History:   Diagnosis Date    Addiction to drug     Alcohol abuse     Anxiety     Asthma     Bipolar 1 disorder     COPD (chronic obstructive pulmonary disease)     Depression     Fatigue     History of psychiatric hospitalization     Hx of psychiatric care     Hypothyroid     Psychiatric problem     Depression, Anxiety, Suicidal Ideations    Psychosis     hx of drug induced psychosis    Sleep difficulties     Suicide attempt     2005, 2013 - OD on "medications"    Therapy     [facility]    Withdrawal symptoms, alcohol     Withdrawal symptoms, drug or narcotic          PSYCHOTROPIC MEDICATIONS   Scheduled Meds:   busPIRone  5 mg Oral BID    diazePAM  2 mg Oral QHS    ferrous sulfate  325 mg Oral BID    folic acid-vit B6-vit B12 2.5-25-2 mg  1 tablet Oral Daily    gabapentin  800 mg Oral TID    levothyroxine  75 mcg Oral Before breakfast    magnesium oxide  400 mg Oral QHS    multivitamin  1 tablet Oral Daily    nicotine  1 patch Transdermal Daily    pantoprazole  40 mg Oral Daily    QUEtiapine  100 mg Oral Daily    QUEtiapine  200 mg Oral QHS    venlafaxine  150 mg Oral Daily     Continuous Infusions:  PRN Meds:.acetaminophen, albuterol, aluminum-magnesium hydroxide-simethicone, docusate sodium, hydrOXYzine pamoate, loperamide, OLANZapine **AND** OLANZapine        EXAMINATION    VITALS   Vitals:    10/30/18 0756   BP: (!) 114/54   Pulse: 65   Resp: 18   Temp: 98.5 °F (36.9 °C)     Body mass index is 30.91 " "kg/m².      CONSTITUTIONAL  General Appearance: WF, in casual garb; NAD     MUSCULOSKELETAL  Muscle Strength and Tone:  normal  Abnormal Involuntary Movements:  none  Gait and Station:  normal; non-ataxic     PSYCHIATRIC   Level of Consciousness: awake, alert  Orientation: p/p/t/s  Grooming:  adequate to circumstances  Psychomotor Behavior: less PMR, np PMA  Speech: nl r/t/v/s  Language:  English fluent  Mood: "anxious"  Affect: improving range, less anxious, less dysthymic; improving  Thought Process:  linear and organized  Associations:  intact; no BARBARA  Thought Content:  denied AVH/delusions; denied HI, no SI  Memory:  intact to recent and remote events  Attention:  intact to conversation; not distractible   Fund of Knowledge:  age and education appropriate  Estimate if Intelligence:  average based on work/education history, vocabulary and mental status exam  Insight:  good- seeks help  Judgment:   good- no bx issues, compliant and cooperative      DIAGNOSTIC TESTING   Laboratory Results  Recent Results (from the past 24 hour(s))   POCT TB Skin Test Read    Collection Time: 10/29/18 10:12 AM   Result Value Ref Range    TB Induration 48 - 72 hr read 0 mm         ASSESSMENT      IMPRESSION   Unspecified Mood Disorder  Unspecified Anxiety Disorder  PTSD     Alcohol Use Disorder, severe, continuous, with physiological dependence  Cocaine Use Disorder, severe, continuous, with physiological dependence  Nicotine Dependence     Alcohol Withdrawal  Cocaine Withdrawal     Psychosocial stressors     Hypothyroidism  COPD/Asthma  Chronic Neuropathic pain  GERD     Pre-diabetes  Iron deficient Microcytic Anemia  B12 insuffiencey      MEDICAL DECISION MAKING          PROBLEM LIST AND MANAGEMENT PLANS; PRESCRIPTION DRUG MANAGEMENT  Compliance: yes  Side Effects: no  Regimen Adjustments:     Mood: Increased Seroquel to 100 mg po q AM and 200 mg po q HS     Anxiety/PTSD: Continue Effexor  mg po q day; consider trial of " prazosin in the future if needed; gabapentin as below; seroquel as above; continue vistaril prn; Continue Buspar 5 mg po BID     Alcohol Use/Cocaine Use: pt counseled; rehab once stable     Nicotine Dependence: pt counseled; nicotine patch daily     Withdrawal: Disconitnue Valium- taper completed     Psychosocial stressors: pt counseled; SW assisting with resources     Hypothyroidism: Continue Synthroid 75 mcg po q AM     COPD/Asthma: FM consulted; currently asymptomatic; Albuterol inhaler 2 puffs q 6 hours prn SOB/wheezing     Neuropathy/Chronic Pain: Increased Gabapentin to 800 mg po TID; B12 deficiency likely was contributing- treatment as below     GERD: Continue Protonix 40 mg po q day     Prediabetes: rechecked CMP- levels normalized; HgA1c was elevated in the prediabetes range; likely increased from substance use, diet and lifestyle; pt counseled on exercise and diet      Iron deficient Anemia: Continue Iron 325 mg po bid    B12 deficiency: pt declined B12 IM repletion; Continue Folbic Po q day     DIAGNOSTIC TESTING  Labs reviewed; follow up pending labs     Disposition:  -SW to assist with aftercare planning and collateral  -Once stable discharge home with outpatient follow up care and/or rehab if able  -Continue inpatient treatment under a PEC and/or CEC for danger to self and grave disability as evident by depression and anxiety with SI and heavy addiction/sustance use requiring inpatient detox and psychiatric stabilization. Patient is improving and should be stable for discharge tomorrow.         NEED FOR CONTINUED HOSPITALIZATION  Psychiatric illness continues to pose a potential threat to life or bodily function, of self or others, thereby requiring the need for continued inpatient psychiatric hospitalization: Yes    Protective inpatient pyschiatric hospitalization required while a safe disposition plan is enacted: Yes    Patient stabilized and ready for discharge from inpatient psychiatric unit:  No      STAFF:   Daniel Antunez MD  Psychiatry

## 2018-10-30 NOTE — CONSULTS
"  [facility]  Adult Nutrition  Consult Note    SUMMARY     Recommendations     Continue to encourage good intake of Regular diet with snacks as needed. RD following     Goals: Continue to meet % EEN/EPN from meals   Nutrition Goal Status: new  Communication of RD Recs: (POC)    Reason for Assessment    Reason for Assessment: length of stay  Diagnosis: psychological disorder(mood disorder)  Relevant Medical History: COPD, depression, S/I, drug/alcohol abuse, GERD    General Information Comments: Pt admitted to [facility] s/p SI. Good appetite & has beeb eating 100% of meals during stay. Not at risk for malnutrition, NFPE not necessary at this time.     Nutrition Discharge Planning: Regular diet    Nutrition Risk Screen    Nutrition Risk Screen: no indicators present    Nutrition/Diet History    Patient Reported Diet/Restrictions/Preferences: general  Food Preferences: no beef, tomatoes, citrus   Do you have any cultural, spiritual, [employer]al conflicts, given your current situation?: no   Food Allergies: NKFA  Factors Affecting Nutritional Intake: None identified at this time    Anthropometrics    Temp: 98.5 °F (36.9 °C)  Height Method: Stated  Height: 5' 2" (157.5 cm)  Height (inches): 62 in  Weight Method: Standard Scale  Weight: 76.7 kg (169 lb)  Weight (lb): 169 lb  Ideal Body Weight (IBW), Female: 110 lb  % Ideal Body Weight, Female (lb): 145.45 lb  BMI (Calculated): 29.3  BMI Grade: 25 - 29.9 - overweight       Lab/Procedures/Meds    Pertinent Labs Reviewed: reviewed  Pertinent Labs Comments: noted  Pertinent Medications Reviewed: reviewed  Pertinent Medications Comments: MVI, ferrous sulfate,folic acid, pantoprazole, levothyroxine     Physical Findings/Assessment    Overall Physical Appearance: nourished  Skin: intact    Estimated/Assessed Needs    Weight Used For Calorie Calculations: 76.7 kg (169 lb 1.5 oz)  Energy Calorie Requirements (kcal): 1668  Energy Need Method: Viki Orellana(PAL " 1.25)  Protein Requirements: 76g (1.0g/kg)  Weight Used For Protein Calculations: 76.7 kg (169 lb 1.5 oz)  Fluid Requirements (mL): 1mL/kcal or per MD  RDA Method (mL): 1668         Nutrition Prescription Ordered    Current Diet Order: Regular   Nutrition Order Comments: No beef, tomatoes or citrus    Evaluation of Received Nutrient/Fluid Intake       % Intake of Estimated Energy Needs: 75 - 100 %  % Meal Intake: 75 - 100 %    Nutrition Risk    Level of Risk/Frequency of Follow-up: low - moderate     Assessment and Plan    No nutrition diagnosis identifies at this time     Monitor and Evaluation    Food and Nutrient Intake: energy intake, food and beverage intake  Food and Nutrient Adminstration: diet order  Anthropometric Measurements: weight, weight change, body mass index  Biochemical Data, Medical Tests and Procedures: electrolyte and renal panel, gastrointestinal profile, glucose/endocrine profile, inflammatory profile, lipid profile  Nutrition-Focused Physical Findings: overall appearance     Nutrition Follow-Up    RD Follow-up?: Yes

## 2018-10-30 NOTE — PLAN OF CARE
Problem: Patient Care Overview (Adult)  Goal: Plan of Care Review  Outcome: Ongoing (interventions implemented as appropriate)  Plan of care reviewed with patient, patient agrees with plan.  Denies suicidal ideations and homicidal ideations. Accepts meals, snacks and medications. Gait steady, no falls. Clear pathways and clutter-free environment maintained. Isolates in room most of time. Tends to seek out nursing staff often.  Promoted an individualized safety plan, effective coping skills, and a drug free lifestyle. Will continue to monitor for safety.

## 2018-10-30 NOTE — PLAN OF CARE
Problem: Patient Care Overview (Adult)  Goal: Plan of Care Review  Recommendations     Continue to encourage good intake of Regular diet with snacks as needed. RD following     Goals: Continue to meet % EEN/EPN from meals   Nutrition Goal Status: new

## 2018-10-30 NOTE — PLAN OF CARE
Problem: Overarching Goals (Adult)  Goal: Optimized Coping Skills in Response to Life Stressors    Intervention: Promote Effective Coping Strategies  Group Note    Behavior:  Patient attended group but refused to participate. Patient presents with improved mood and affect. Patient did sit at the table with other group members this time.         Intervention:       Counselor implemented a group therapy session with materials from the Group Treatment for Substance Abuse, second edition, A Skcvjm-kn-Sbnqgc Therapy Manual. The materials used were from Action/Maintenence Sequence Session Twelve discussing Managing Anxiety. (pp. 252-259).         Response:  Patient state she has anxiety. Patient was attentive, but did not contribute to the discussion.       Plan:  Patient preparing for discharge on tomorrow.

## 2018-10-30 NOTE — PSYCH
I spoke with an employee of [employer], who related that they are still awaiting confirmation of the patient's insurance. After the insurance has been confirmed the patient will have a phone interview.   I also spoke with the patient about [facility]; therefore she signed a release of information form. A packet has been faxed to the [place] office.

## 2018-10-31 VITALS
SYSTOLIC BLOOD PRESSURE: 129 MMHG | TEMPERATURE: 97 F | BODY MASS INDEX: 31.1 KG/M2 | HEART RATE: 76 BPM | WEIGHT: 169 LBS | HEIGHT: 62 IN | DIASTOLIC BLOOD PRESSURE: 60 MMHG | RESPIRATION RATE: 18 BRPM

## 2018-10-31 PROBLEM — F32.A DEPRESSION WITH SUICIDAL IDEATION: Status: RESOLVED | Noted: 2018-10-19 | Resolved: 2018-10-31

## 2018-10-31 PROBLEM — R45.851 DEPRESSION WITH SUICIDAL IDEATION: Status: RESOLVED | Noted: 2018-10-19 | Resolved: 2018-10-31

## 2018-10-31 PROCEDURE — 25000003 PHARM REV CODE 250: Performed by: PSYCHIATRY & NEUROLOGY

## 2018-10-31 PROCEDURE — S4991 NICOTINE PATCH NONLEGEND: HCPCS | Performed by: PSYCHIATRY & NEUROLOGY

## 2018-10-31 PROCEDURE — 99239 HOSP IP/OBS DSCHRG MGMT >30: CPT | Mod: ,,, | Performed by: PSYCHIATRY & NEUROLOGY

## 2018-10-31 RX ORDER — LEVOTHYROXINE SODIUM 75 UG/1
75 TABLET ORAL
Qty: 30 TABLET | Refills: 1 | Status: ON HOLD | OUTPATIENT
Start: 2018-11-01 | End: 2019-03-18 | Stop reason: HOSPADM

## 2018-10-31 RX ORDER — VENLAFAXINE HYDROCHLORIDE 150 MG/1
150 CAPSULE, EXTENDED RELEASE ORAL DAILY
Qty: 30 CAPSULE | Refills: 1 | Status: ON HOLD | OUTPATIENT
Start: 2018-11-01 | End: 2019-03-18 | Stop reason: HOSPADM

## 2018-10-31 RX ORDER — BUSPIRONE HYDROCHLORIDE 5 MG/1
5 TABLET ORAL 2 TIMES DAILY
Qty: 60 TABLET | Refills: 1 | Status: ON HOLD | OUTPATIENT
Start: 2018-10-31 | End: 2019-03-18 | Stop reason: HOSPADM

## 2018-10-31 RX ORDER — QUETIAPINE FUMARATE 100 MG/1
TABLET, FILM COATED ORAL
Qty: 90 TABLET | Refills: 1 | Status: ON HOLD | OUTPATIENT
Start: 2018-10-31 | End: 2019-03-18 | Stop reason: HOSPADM

## 2018-10-31 RX ORDER — FERROUS SULFATE 325(65) MG
325 TABLET ORAL 2 TIMES DAILY
Qty: 60 TABLET | Refills: 1 | Status: ON HOLD | OUTPATIENT
Start: 2018-10-31 | End: 2019-03-18 | Stop reason: HOSPADM

## 2018-10-31 RX ORDER — IBUPROFEN 200 MG
1 TABLET ORAL DAILY
Status: ON HOLD | COMMUNITY
Start: 2018-10-31 | End: 2019-03-18 | Stop reason: HOSPADM

## 2018-10-31 RX ORDER — GABAPENTIN 400 MG/1
800 CAPSULE ORAL 3 TIMES DAILY
Qty: 180 CAPSULE | Refills: 1 | Status: ON HOLD | OUTPATIENT
Start: 2018-10-31 | End: 2019-03-18 | Stop reason: HOSPADM

## 2018-10-31 RX ORDER — LANOLIN ALCOHOL/MO/W.PET/CERES
400 CREAM (GRAM) TOPICAL NIGHTLY
Qty: 30 TABLET | Refills: 0 | Status: ON HOLD | OUTPATIENT
Start: 2018-10-31 | End: 2019-03-18 | Stop reason: HOSPADM

## 2018-10-31 RX ORDER — HYDROXYZINE PAMOATE 50 MG/1
50 CAPSULE ORAL EVERY 6 HOURS PRN
Qty: 90 CAPSULE | Refills: 1 | Status: ON HOLD | OUTPATIENT
Start: 2018-10-31 | End: 2019-08-16 | Stop reason: HOSPADM

## 2018-10-31 RX ORDER — PANTOPRAZOLE SODIUM 40 MG/1
40 TABLET, DELAYED RELEASE ORAL DAILY
Qty: 30 TABLET | Refills: 1 | Status: ON HOLD | OUTPATIENT
Start: 2018-10-31 | End: 2019-03-18 | Stop reason: HOSPADM

## 2018-10-31 RX ADMIN — FERROUS SULFATE TAB 325 MG (65 MG ELEMENTAL FE) 325 MG: 325 (65 FE) TAB at 08:10

## 2018-10-31 RX ADMIN — LEVOTHYROXINE SODIUM 75 MCG: 25 TABLET ORAL at 05:10

## 2018-10-31 RX ADMIN — VENLAFAXINE HYDROCHLORIDE 150 MG: 150 CAPSULE, EXTENDED RELEASE ORAL at 08:10

## 2018-10-31 RX ADMIN — HYDROXYZINE PAMOATE 50 MG: 50 CAPSULE ORAL at 06:10

## 2018-10-31 RX ADMIN — GABAPENTIN 800 MG: 400 CAPSULE ORAL at 08:10

## 2018-10-31 RX ADMIN — NICOTINE 1 PATCH: 14 PATCH, EXTENDED RELEASE TRANSDERMAL at 09:10

## 2018-10-31 RX ADMIN — ACETAMINOPHEN 650 MG: 325 TABLET ORAL at 06:10

## 2018-10-31 RX ADMIN — BUSPIRONE HYDROCHLORIDE 5 MG: 5 TABLET ORAL at 08:10

## 2018-10-31 RX ADMIN — QUETIAPINE FUMARATE 100 MG: 100 TABLET ORAL at 08:10

## 2018-10-31 RX ADMIN — HYDROXYZINE PAMOATE 50 MG: 50 CAPSULE ORAL at 02:10

## 2018-10-31 RX ADMIN — THERA TABS 1 TABLET: TAB at 09:10

## 2018-10-31 RX ADMIN — PANTOPRAZOLE SODIUM 40 MG: 40 TABLET, DELAYED RELEASE ORAL at 08:10

## 2018-10-31 RX ADMIN — GABAPENTIN 800 MG: 400 CAPSULE ORAL at 02:10

## 2018-10-31 RX ADMIN — Medication 1 TABLET: at 08:10

## 2018-10-31 NOTE — PSYCH
Patient will be following up with [facility] at [address] [place] [address] In [place], [phone].  Appointment is on 11/2/2018 at 7 am.  Patient will receive a tobacco cessation therapy appointment along with a substance abuse therapy appointment due to a positive UDS on admit.  [facility]S faxed on 10/31/2018 at 1:10 pm.

## 2018-10-31 NOTE — PROGRESS NOTES
10/31/18 1030   [facility] Group Therapy   Group Name Leisure Skills Training   Specific Interventions Cognitive Stimulation Training   Participation Level Minimal   Participation Quality Lack of Interest   Insight/Motivation Limited   Affect/Mood Display Appropriate   Cognition Alert   Psychomotor WNL   Patient joined the activity late, sat mostly observed. Patient states she did not want to say much because she did not want the teams to be uneven. Patient smiling, expressing happiness because the ladies won the game. Patient said she played this game on the unit and liked it.

## 2018-10-31 NOTE — PLAN OF CARE
Problem: Patient Care Overview (Adult)  Goal: Plan of Care Review  Outcome: Ongoing (interventions implemented as appropriate)  Plan of care reviewed with patient, patient agrees with plan.  Denies suicidal ideations and homicidal ideations. Accepts meals, snacks and medications. Gait steady, no falls. Clear pathways and clutter-free environment maintained. Out of room and stays in dayroom most of the time. Minimal interaction with peers.   Tends to seek out nursing staff often.  Promoted an individualized safety plan, effective coping skills, and a drug free lifestyle. Prepared to go to rehab. Will continue to monitor for safety.

## 2018-10-31 NOTE — PSYCH
Called [employer] at 11:14 am on 10/31/2018 to set up ride for patient to be brought to home address at [address] [place], [place] [id number] [place] [address].  Ride conformation number is [id number].  Spoke to Krystyna with [employer].  Ride is scheduled to be here between now and 3:11 pm.  Called [employer] and gave them the conformation number for them to try to get the ride.

## 2018-10-31 NOTE — PSYCH
Order for discharge received.Discharge instructions explained to pt and voiced a understanding.Calm,cooperative,no si/hi,or psychosis.Pt will receive a ride home with medicaid transportation.

## 2018-10-31 NOTE — PLAN OF CARE
Problem: Patient Care Overview (Adult)  Goal: Plan of Care Review  Outcome: Ongoing (interventions implemented as appropriate)  Pt.  Slept  8 Hours  so far this shift without problems noted. q 15 min safety checks done this shift. Safety and comfort maintained. Cont. Plan.

## 2018-10-31 NOTE — DISCHARGE SUMMARY
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 - 325.0 mg/dL Final    Toxicology Information 10/04/2018 SEE COMMENT   Final    Alcohol, Medical, Serum 10/04/2018 <10  <10 mg/dL Final    Acetaminophen (Tylenol), Serum 10/04/2018 <3.0* 10.0 - 20.0 ug/mL Final    Preg Test, Ur 10/04/2018 Negative   Final         Discharged Condition: stable and improved; not currently a danger to self/others or gravely disabled    Disposition: Home or Self Care    Is patient being discharged on multiple neuroleptics? No    Follow Up/Patient Instructions:     Medications:  Reconciled Home Medications:      Medication List      START taking these medications    busPIRone 5 MG Tab  Commonly known as:  BUSPAR  Take 1 tablet (5 mg total) by mouth 2 (two) times daily.     ferrous sulfate 325 mg (65 mg iron) Tab tablet  Commonly known as:  FEOSOL  Take 1 tablet (325 mg total) by mouth 2 (two) times daily.     folic acid-vit B6-vit B12 2.5-25-2 mg 2.5-25-2 mg Tab  Commonly known as:  FOLBIC or Equiv  Take 1 tablet by mouth once daily.  Start taking on:  11/1/2018     hydrOXYzine pamoate 50 MG Cap  Commonly known as:  VISTARIL  Take 1 capsule (50 mg total) by mouth every 6 (six) hours as needed (Insomnia or anxiety).     magnesium oxide 400 mg (241.3 mg magnesium) tablet  Commonly known as:  MAG-OX  Take 1 tablet (400 mg  total) by mouth every evening.     nicotine 14 mg/24 hr  Commonly known as:  NICODERM CQ  Place 1 patch onto the skin once daily.     venlafaxine 150 MG Cp24  Commonly known as:  EFFEXOR-XR  Take 1 capsule (150 mg total) by mouth once daily.  Start taking on:  11/1/2018        CHANGE how you take these medications    gabapentin 400 MG capsule  Commonly known as:  NEURONTIN  Take 2 capsules (800 mg total) by mouth 3 (three) times daily.  What changed:    · medication strength  · how much to take     QUEtiapine 100 MG Tab  Commonly known as:  SEROQUEL  Take one tablet (100 mg) every morning and 2 tablets (200 mg) every night at bedtime  What changed:    · medication strength  · how much to take  · how to take this  · when to take this  · additional instructions        CONTINUE taking these medications    levothyroxine 75 MCG tablet  Commonly known as:  SYNTHROID  Take 1 tablet (75 mcg total) by mouth before breakfast.  Start taking on:  11/1/2018     pantoprazole 40 MG tablet  Commonly known as:  PROTONIX  Take 1 tablet (40 mg total) by mouth once daily.     PROAIR HFA 90 mcg/actuation inhaler  Generic drug:  albuterol  Inhale 2 puffs into the lungs every 6 (six) hours as needed for Wheezing. Rescue        STOP taking these medications    ATROVENT HFA 17 mcg/actuation inhaler  Generic drug:  ipratropium     clonazePAM 0.5 MG tablet  Commonly known as:  KLONOPIN     meloxicam 7.5 MG tablet  Commonly known as:  MOBIC     nicotine (polacrilex) 2 mg Gum  Commonly known as:  NICORETTE     omeprazole 40 MG capsule  Commonly known as:  PRILOSEC     ondansetron 4 MG tablet  Commonly known as:  ZOFRAN     OXcarbazepine 300 MG Tab  Commonly known as:  TRILEPTAL     propranolol 10 MG tablet  Commonly known as:  INDERAL          No discharge procedures on file.  Follow-up Information     Go to [facility].    Specialties:  Behavioral Health, Psychiatry, Psychology  Why:  Outpatient Psych Services and,  Addictive Disorders  Contact information:  [address] [address]  [phone]                   Diet: regular     Activity as tolerated    Total time spent discharging patient: 32 minutes    Daniel Antunez MD  Psychiatry

## 2018-12-28 ENCOUNTER — HOSPITAL ENCOUNTER (EMERGENCY)
Facility: HOSPITAL | Age: 53
Discharge: HOME | End: 2018-12-28
Attending: EMERGENCY MEDICINE
Payer: MEDICARE

## 2018-12-28 VITALS
OXYGEN SATURATION: 95 % | WEIGHT: 167.56 LBS | TEMPERATURE: 98 F | SYSTOLIC BLOOD PRESSURE: 130 MMHG | BODY MASS INDEX: 30.65 KG/M2 | DIASTOLIC BLOOD PRESSURE: 73 MMHG | RESPIRATION RATE: 18 BRPM | HEART RATE: 100 BPM

## 2018-12-28 DIAGNOSIS — M25.579 ANKLE PAIN: ICD-10-CM

## 2018-12-28 PROCEDURE — 99283 EMERGENCY DEPT VISIT LOW MDM: CPT

## 2018-12-28 RX ORDER — NAPROXEN 375 MG/1
375 TABLET ORAL 2 TIMES DAILY WITH MEALS
Qty: 30 TABLET | Refills: 0 | Status: ON HOLD | OUTPATIENT
Start: 2018-12-28 | End: 2019-03-18 | Stop reason: HOSPADM

## 2018-12-28 NOTE — ED PROVIDER NOTES
"SCRIBE #1 NOTE: I, Charles Raul, am scribing for, and in the presence of, Spike Russell MD. I have scribed the entire note.       History     Chief Complaint   Patient presents with    Ankle Pain     bilateral ankle pain x 2 days     Review of patient's allergies indicates:   Allergen Reactions    Methadone Other (See Comments)     "I'm not myself."         History of Present Illness     HPI    2018, 7:53 AM  History obtained from the patient      History of Present Illness: Jo-Ann Wray is a 53 y.o. female patient with a PMHx of EtOH abuse and COPD who presents to the Emergency Department for evaluation of bilateral ankle pain which onset suddenly x2 days ago. Pt reports she injured her right ankle several months ago but denies any acute trauma. Symptoms are constant and moderate in severity. Exacerbated by bearing weight and relieved by nothing. No associated sxs. Patient denies any fever, chills, weakness/numbness, fall, gait problem, and all other sxs at this time. Pt denies tx PTA. No further complaints or concerns at this time.         Arrival mode: Personal vehicle    PCP: Primary Doctor No        Past Medical History:  Past Medical History:   Diagnosis Date    Addiction to drug     Alcohol abuse     Anxiety     Asthma     Bipolar 1 disorder     COPD (chronic obstructive pulmonary disease)     Depression     Fatigue     History of psychiatric hospitalization     Hx of psychiatric care     Hypothyroid     Psychiatric problem     Depression, Anxiety, Suicidal Ideations    Psychosis     hx of drug induced psychosis    Sleep difficulties     Suicide attempt     ,  - OD on "medications"    Therapy     [facility]    Withdrawal symptoms, alcohol     Withdrawal symptoms, drug or narcotic        Past Surgical History:  Past Surgical History:   Procedure Laterality Date     SECTION      GASTRIC BYPASS           Family " "History:  Family History   Problem Relation Age of Onset    Dementia Mother     Heart attack Mother     Stroke Mother        Social History:  Social History     Tobacco Use    Smoking status: Current Every Day Smoker     Packs/day: 0.50     Types: Cigarettes    Smokeless tobacco: Never Used   Substance and Sexual Activity    Alcohol use: Yes     Alcohol/week: 2.4 - 3.0 oz     Types: 4 - 5 Cans of beer per week     Comment: "a couple times a month"    Drug use: Yes     Types: Cocaine     Comment: used this morning     Sexual activity: Not Currently     Partners: Male        Review of Systems     Review of Systems   Constitutional: Negative for chills and fever.        (-) Fall   HENT: Negative for congestion and sore throat.    Eyes: Negative for photophobia and visual disturbance.   Respiratory: Negative for cough and shortness of breath.    Cardiovascular: Negative for chest pain.   Gastrointestinal: Negative for nausea and vomiting.   Genitourinary: Negative for dysuria, frequency, hematuria and urgency.   Musculoskeletal: Positive for arthralgias (Bilateral ankle pain). Negative for back pain, gait problem and neck pain.        (-) Acute trauma   Skin: Negative for rash and wound.   Neurological: Negative for dizziness, weakness, light-headedness, numbness and headaches.   Hematological: Does not bruise/bleed easily.   All other systems reviewed and are negative.     Physical Exam     Initial Vitals [12/28/18 0752]   BP Pulse Resp Temp SpO2   130/73 100 18 98 °F (36.7 °C) 95 %      MAP       --          Physical Exam  Nursing Notes and Vital Signs Reviewed.  Constitutional: Patient is in no acute distress. Well-developed and well-nourished.  Head: Atraumatic. Normocephalic.  Eyes: PERRL. EOM intact. Conjunctivae are not pale. No scleral icterus.  ENT: Mucous membranes are moist. Oropharynx is clear and symmetric.    Neck: Supple. Full ROM. No lymphadenopathy.  Cardiovascular: Regular rate. Regular " rhythm.  Pulmonary/Chest: No respiratory distress.   Abdominal: Soft and non-distended.  There is no tenderness.   Musculoskeletal: Moves all extremities. No obvious deformities. No edema.   Right/Left Ankle:  No obvious deformity. There is no swelling.  There is mild tenderness.  She is able to bear weight.   Ankle dorsiflexion and ankle plantar flexion are intact.  Intact sensation to light touch. Distal capillary refill takes less than 2 seconds.  PT and DP pulses are 2+ bilaterally.  Skin: Warm and dry.  Neurological:  Alert, awake, and appropriate.  Normal speech.  No acute focal neurological deficits are appreciated.  Psychiatric: Normal affect. Good eye contact. Appropriate in content.     ED Course   Procedures  ED Vital Signs:  Vitals:    12/28/18 0752   BP: 130/73   Pulse: 100   Resp: 18   Temp: 98 °F (36.7 °C)   TempSrc: Oral   SpO2: 95%   Weight: 76 kg (167 lb 8.8 oz)       Imaging Results:  Imaging Results          X-Ray Ankle Complete Bilateral (Final result)  Result time 12/28/18 08:43:58    Final result by Eloy Gaffney MD (12/28/18 08:43:58)                 Impression:      No acute fracture or dislocation.      Electronically signed by: Eloy Gaffney MD  Date:    12/28/2018  Time:    08:43             Narrative:    EXAMINATION:  XR ANKLE COMPLETE 3 VIEW BILATERAL    CLINICAL HISTORY:  XR ANKLE COMPLETE 3 VIEW BILATERALPain in unspecified ankle and joints of unspecified foot    COMPARISON:  None    FINDINGS:  Three views of the bilateral ankle were obtained.    No evidence of acute fracture or dislocation.  Small ossicle seen at the tip of the right medial malleolus has the appearance of chronic avulsion injury.  Mild osteopenia and mild soft tissue swelling with scattered degenerative changes.  Moderate plantar calcaneal spur noted laterally, left greater than right.                                      The Emergency Provider reviewed the vital signs and test results, which are outlined  above.     ED Discussion     8:52 AM: Reassessed pt at this time. Pt is awake, alert, and in NAD. Pt states her condition has improved at this time. Discussed with pt all pertinent ED information and results. Discussed pt dx and plan of tx. Gave pt all f/u and return to the ED instructions. All questions and concerns were addressed at this time. Pt expresses understanding of information and instructions, and is comfortable with plan to discharge. Pt is stable for discharge.    I discussed with patient and/or family/caretaker that evaluation in the ED does not suggest any emergent or life threatening medical conditions requiring immediate intervention beyond what was provided in the ED, and I believe patient is safe for discharge.  Regardless, an unremarkable evaluation in the ED does not preclude the development or presence of a serious of life threatening condition. As such, patient was instructed to return immediately for any worsening or change in current symptoms.      ED Medication(s):  Medications - No data to display    Current Discharge Medication List      START taking these medications    Details   naproxen (NAPROSYN) 375 MG tablet Take 1 tablet (375 mg total) by mouth 2 (two) times daily with meals.  Qty: 30 tablet, Refills: 0               Follow-up Information     [facility] In 2 days.    Contact information:  [address] [facility] [address] [phone]                         Medical Decision Making:   Clinical Tests:   Radiological Study: Ordered and Reviewed             Scribe Attestation:   Scribe #1: I performed the above scribed service and the documentation accurately describes the services I performed. I attest to the accuracy of the note.     Attending:   Physician Attestation Statement for Scribe #1: I, Spike Russell MD, personally performed the services described in this documentation, as scribed by Charles Carter, in my presence, and it is both accurate and  complete.           Clinical Impression       ICD-10-CM ICD-9-CM   1. Ankle pain M25.579 719.47       Disposition:   Disposition: Discharged  Condition: Stable         Spike Russell MD  12/28/18 0931

## 2018-12-28 NOTE — ED NOTES
Bed: Dispo 2  Expected date:   Expected time:   Means of arrival:   Comments:     Tammie Maurice RN  12/28/18 0756

## 2019-03-09 ENCOUNTER — HOSPITAL ENCOUNTER (EMERGENCY)
Facility: HOSPITAL | Age: 54
Discharge: PSYCHIATRIC OR SUBSTANCE USE FACILITY | End: 2019-03-09
Attending: EMERGENCY MEDICINE
Payer: MEDICARE

## 2019-03-09 VITALS
HEIGHT: 63 IN | TEMPERATURE: 98 F | DIASTOLIC BLOOD PRESSURE: 76 MMHG | HEART RATE: 82 BPM | RESPIRATION RATE: 16 BRPM | SYSTOLIC BLOOD PRESSURE: 126 MMHG | BODY MASS INDEX: 30.86 KG/M2 | WEIGHT: 174.19 LBS | OXYGEN SATURATION: 98 %

## 2019-03-09 DIAGNOSIS — R45.851 DEPRESSION WITH SUICIDAL IDEATION: Primary | ICD-10-CM

## 2019-03-09 DIAGNOSIS — F32.A DEPRESSION WITH SUICIDAL IDEATION: Primary | ICD-10-CM

## 2019-03-09 PROBLEM — F31.4 BIPOLAR DISORDER WITH SEVERE DEPRESSION: Status: ACTIVE | Noted: 2019-03-09

## 2019-03-09 LAB
ALBUMIN SERPL BCP-MCNC: 3.1 G/DL
ALP SERPL-CCNC: 52 U/L
ALT SERPL W/O P-5'-P-CCNC: 8 U/L
AMPHET+METHAMPHET UR QL: NEGATIVE
ANION GAP SERPL CALC-SCNC: 11 MMOL/L
APAP SERPL-MCNC: <3 UG/ML
AST SERPL-CCNC: 12 U/L
BACTERIA #/AREA URNS HPF: ABNORMAL /HPF
BARBITURATES UR QL SCN>200 NG/ML: NEGATIVE
BASOPHILS # BLD AUTO: 0.02 K/UL
BASOPHILS NFR BLD: 0.4 %
BENZODIAZ UR QL SCN>200 NG/ML: NEGATIVE
BILIRUB SERPL-MCNC: 0.1 MG/DL
BILIRUB UR QL STRIP: NEGATIVE
BUN SERPL-MCNC: 12 MG/DL
BZE UR QL SCN: NORMAL
CALCIUM SERPL-MCNC: 8.8 MG/DL
CANNABINOIDS UR QL SCN: NEGATIVE
CHLORIDE SERPL-SCNC: 110 MMOL/L
CLARITY UR: CLEAR
CO2 SERPL-SCNC: 21 MMOL/L
COLOR UR: YELLOW
CREAT SERPL-MCNC: 0.7 MG/DL
CREAT UR-MCNC: 43.4 MG/DL
DIFFERENTIAL METHOD: ABNORMAL
EOSINOPHIL # BLD AUTO: 0.1 K/UL
EOSINOPHIL NFR BLD: 1 %
ERYTHROCYTE [DISTWIDTH] IN BLOOD BY AUTOMATED COUNT: 17.6 %
EST. GFR  (AFRICAN AMERICAN): >60 ML/MIN/1.73 M^2
EST. GFR  (NON AFRICAN AMERICAN): >60 ML/MIN/1.73 M^2
ETHANOL SERPL-MCNC: 28 MG/DL
GLUCOSE SERPL-MCNC: 125 MG/DL
GLUCOSE UR QL STRIP: NEGATIVE
HCT VFR BLD AUTO: 34.1 %
HGB BLD-MCNC: 10.5 G/DL
HGB UR QL STRIP: ABNORMAL
KETONES UR QL STRIP: NEGATIVE
LEUKOCYTE ESTERASE UR QL STRIP: ABNORMAL
LYMPHOCYTES # BLD AUTO: 2 K/UL
LYMPHOCYTES NFR BLD: 38.7 %
MCH RBC QN AUTO: 24.4 PG
MCHC RBC AUTO-ENTMCNC: 30.8 G/DL
MCV RBC AUTO: 79 FL
METHADONE UR QL SCN>300 NG/ML: NEGATIVE
MICROSCOPIC COMMENT: ABNORMAL
MONOCYTES # BLD AUTO: 0.2 K/UL
MONOCYTES NFR BLD: 4.7 %
NEUTROPHILS # BLD AUTO: 2.8 K/UL
NEUTROPHILS NFR BLD: 55.2 %
NITRITE UR QL STRIP: NEGATIVE
OPIATES UR QL SCN: NEGATIVE
PCP UR QL SCN>25 NG/ML: NEGATIVE
PH UR STRIP: 6 [PH] (ref 5–8)
PLATELET # BLD AUTO: 446 K/UL
PMV BLD AUTO: 9.5 FL
POTASSIUM SERPL-SCNC: 4.3 MMOL/L
PROT SERPL-MCNC: 6.2 G/DL
PROT UR QL STRIP: NEGATIVE
RBC # BLD AUTO: 4.31 M/UL
RBC #/AREA URNS HPF: 1 /HPF (ref 0–4)
SALICYLATES SERPL-MCNC: <5 MG/DL
SODIUM SERPL-SCNC: 142 MMOL/L
SP GR UR STRIP: <=1.005 (ref 1–1.03)
SQUAMOUS #/AREA URNS HPF: 3 /HPF
TOXICOLOGY INFORMATION: NORMAL
TSH SERPL DL<=0.005 MIU/L-ACNC: 0.54 UIU/ML
URN SPEC COLLECT METH UR: ABNORMAL
UROBILINOGEN UR STRIP-ACNC: NEGATIVE EU/DL
WBC # BLD AUTO: 5.06 K/UL
WBC #/AREA URNS HPF: 3 /HPF (ref 0–5)

## 2019-03-09 PROCEDURE — 25000003 PHARM REV CODE 250: Performed by: EMERGENCY MEDICINE

## 2019-03-09 PROCEDURE — 84443 ASSAY THYROID STIM HORMONE: CPT

## 2019-03-09 PROCEDURE — 80053 COMPREHEN METABOLIC PANEL: CPT

## 2019-03-09 PROCEDURE — 80307 DRUG TEST PRSMV CHEM ANLYZR: CPT

## 2019-03-09 PROCEDURE — 81000 URINALYSIS NONAUTO W/SCOPE: CPT

## 2019-03-09 PROCEDURE — 80329 ANALGESICS NON-OPIOID 1 OR 2: CPT

## 2019-03-09 PROCEDURE — 85025 COMPLETE CBC W/AUTO DIFF WBC: CPT

## 2019-03-09 PROCEDURE — 86703 HIV-1/HIV-2 1 RESULT ANTBDY: CPT

## 2019-03-09 PROCEDURE — 99285 EMERGENCY DEPT VISIT HI MDM: CPT

## 2019-03-09 PROCEDURE — 80320 DRUG SCREEN QUANTALCOHOLS: CPT

## 2019-03-09 RX ORDER — HALOPERIDOL 5 MG/1
5 TABLET ORAL 3 TIMES DAILY
Status: ON HOLD | COMMUNITY
End: 2019-03-18 | Stop reason: HOSPADM

## 2019-03-09 RX ORDER — ACETAMINOPHEN 325 MG/1
650 TABLET ORAL
Status: COMPLETED | OUTPATIENT
Start: 2019-03-09 | End: 2019-03-09

## 2019-03-09 RX ADMIN — ACETAMINOPHEN 650 MG: 325 TABLET ORAL at 07:03

## 2019-03-09 NOTE — ED NOTES
Pt lying in bed comfortably watching tv. AAO x 4. Resp even and unlabored with equal chest rise and fall. Skin warm and dry. Pt request to use phone to call her fiance'. Pt ambulated without difficulty to nurses station with RN. Pt unable to contact anyone. Pt updated on placement. Pt verbalized understanding. Pt asked to sit up in chair in room instead of bed. Pt sitting in chair at bedside watching tv. Denies any further needs. Lillian booth at bedside doing 15 min checks

## 2019-03-09 NOTE — ED NOTES
Patient accepted by James at [facility], [address], with Dr Mullins. Call for report at [phone]

## 2019-03-09 NOTE — ED PROVIDER NOTES
"SCRIBE #1 NOTE: I, Raven Levin, am scribing for, and in the presence of, Faith Garvey MD. I have scribed the HPI, ROS, and PEx.     SCRIBE #2 NOTE: I, Savita Wolfe, am scribing for, and in the presence of,  Yuli Gill Do, MD. I have scribed the remaining portions of the note not scribed by Scribe #1.     History      Chief Complaint   Patient presents with    Suicidal     out of some meds for a few weeks, doesn't feel like herself       Review of patient's allergies indicates:   Allergen Reactions    Beef containing products      Indigestion, no anaphylaxis    Citrus and derivatives      Indigestion, no anaphylaxis    Methadone Other (See Comments)     "I'm not myself."    Tomato      Indigestion, no anaphylaxis        HPI   HPI    3/9/2019, 3:41 AM   History obtained from the patient      History of Present Illness: Jo-Ann Wray is a 53 y.o. female patient with a PMHx of EtOH abuse, Bipolar 1 disprder, COPD, depression, anxiety, suicide attempt who presents to the Emergency Department for SI which onset gradually a few times ago. Pt has a plan via OD, she has attempted suicide via OD in the past. Symptoms are constant and moderate in severity. No mitigating or exacerbating factors reported. Pt reports she is out of some of her medications. She has required inpatient psychiatric care in the past. No associated sxs. Patient denies any fever, chills, n/v/d, CP, SOB, HI, AH, VH, etoh, ivda, and all other sxs at this time. No further complaints or concerns at this time.       Arrival mode: Personal vehicle      PCP: Primary Doctor No       Past Medical History:  Past Medical History:   Diagnosis Date    Addiction to drug     Alcohol abuse     Anxiety     Asthma     Bipolar 1 disorder     COPD (chronic obstructive pulmonary disease)     Depression     Fatigue     History of psychiatric hospitalization     Hx of psychiatric care     Hypothyroid     Psychiatric problem     Depression, Anxiety, " "Suicidal Ideations    Psychosis     hx of drug induced psychosis    Sleep difficulties     Suicide attempt     , 2013 - OD on "medications"    Therapy     [facility]    Withdrawal symptoms, alcohol     Withdrawal symptoms, drug or narcotic        Past Surgical History:  Past Surgical History:   Procedure Laterality Date    CARPAL TUNNEL RELEASE       SECTION      GASTRIC BYPASS           Family History:  Family History   Problem Relation Age of Onset    Dementia Mother     Heart attack Mother     Stroke Mother        Social History:  Social History     Tobacco Use    Smoking status: Current Every Day Smoker     Packs/day: 0.50     Types: Cigarettes    Smokeless tobacco: Never Used   Substance and Sexual Activity    Alcohol use: Yes     Alcohol/week: 2.4 - 3.0 oz     Types: 4 - 5 Cans of beer per week     Comment: "a couple times a month"    Drug use: Yes     Types: Cocaine     Comment: used this morning     Sexual activity: Not Currently     Partners: Male       ROS   Review of Systems   Constitutional: Negative for chills and fever.   HENT: Negative for sore throat.    Eyes: Negative for visual disturbance.   Respiratory: Negative for shortness of breath.    Cardiovascular: Negative for chest pain.   Gastrointestinal: Negative for diarrhea, nausea and vomiting.   Genitourinary: Negative for dysuria.   Musculoskeletal: Negative for back pain.   Skin: Negative for rash.   Neurological: Negative for weakness.   Hematological: Does not bruise/bleed easily.   Psychiatric/Behavioral: Positive for suicidal ideas. Negative for hallucinations and sleep disturbance.        (-) HI  (-) EtOH  (-) IVDA   All other systems reviewed and are negative.    Physical Exam      Initial Vitals [19 0250]   BP Pulse Resp Temp SpO2   (!) 140/69 95 16 98.4 °F (36.9 °C) 95 %      MAP       --          Physical Exam  Nursing Notes and Vital Signs Reviewed.  Constitutional: " "Patient is in no acute distress. Well-developed and well-nourished.  Head: Atraumatic. Normocephalic.  Eyes: PERRL. EOM intact. Conjunctivae are not pale. No scleral icterus.  ENT: Mucous membranes are moist. Oropharynx is clear and symmetric.    Neck: Supple. Full ROM. No lymphadenopathy.  Cardiovascular: Regular rate. Regular rhythm. No murmurs, rubs, or gallops. Distal pulses are 2+ and symmetric.  Pulmonary/Chest: No respiratory distress. Clear to auscultation bilaterally. No wheezing or rales.  Abdominal: Soft and non-distended.  There is no tenderness.  No rebound, guarding, or rigidity.   Musculoskeletal: Moves all extremities. No obvious deformities. No edema. No calf tenderness.  Skin: Warm and dry.  Neurological:  Alert, awake, and appropriate.  Normal speech.  No acute focal neurological deficits are appreciated.  Psychiatric: Flat affect. Good eye contact. Appropriate in content. Suicidal ideations with plan to overdose. No HI. No hallucinations.     ED Course    Procedures  ED Vital Signs:  Vitals:    03/09/19 0250 03/09/19 0953 03/09/19 1111   BP: (!) 140/69 122/74 126/76   Pulse: 95 79 82   Resp: 16 16 16   Temp: 98.4 °F (36.9 °C) 98.3 °F (36.8 °C) 98.4 °F (36.9 °C)   TempSrc: Oral Oral    SpO2: 95% 98%    Weight: 79 kg (174 lb 2.6 oz)     Height: 5' 3" (1.6 m)         Abnormal Lab Results:  Labs Reviewed   CBC W/ AUTO DIFFERENTIAL - Abnormal; Notable for the following components:       Result Value    Hemoglobin 10.5 (*)     Hematocrit 34.1 (*)     MCV 79 (*)     MCH 24.4 (*)     MCHC 30.8 (*)     RDW 17.6 (*)     Platelets 446 (*)     Mono # 0.2 (*)     All other components within normal limits   COMPREHENSIVE METABOLIC PANEL - Abnormal; Notable for the following components:    CO2 21 (*)     Glucose 125 (*)     Albumin 3.1 (*)     Alkaline Phosphatase 52 (*)     ALT 8 (*)     All other components within normal limits   URINALYSIS, REFLEX TO URINE CULTURE - Abnormal; Notable for the following " components:    Specific Gravity, UA <=1.005 (*)     Occult Blood UA 2+ (*)     Leukocytes, UA Trace (*)     All other components within normal limits    Narrative:     Preferred Collection Type->Urine, Clean Catch   ALCOHOL,MEDICAL (ETHANOL) - Abnormal; Notable for the following components:    Alcohol, Medical, Serum 28 (*)     All other components within normal limits   ACETAMINOPHEN LEVEL - Abnormal; Notable for the following components:    Acetaminophen (Tylenol), Serum <3.0 (*)     All other components within normal limits   SALICYLATE LEVEL - Abnormal; Notable for the following components:    Salicylate Lvl <5.0 (*)     All other components within normal limits   URINALYSIS MICROSCOPIC - Abnormal; Notable for the following components:    Bacteria, UA Few (*)     All other components within normal limits    Narrative:     Preferred Collection Type->Urine, Clean Catch   TSH   DRUG SCREEN PANEL, URINE EMERGENCY    Narrative:     Preferred Collection Type->Urine, Clean Catch   HIV 1 / 2 ANTIBODY        All Lab Results:  Results for orders placed or performed during the hospital encounter of 03/09/19   CBC auto differential   Result Value Ref Range    WBC 5.06 3.90 - 12.70 K/uL    RBC 4.31 4.00 - 5.40 M/uL    Hemoglobin 10.5 (L) 12.0 - 16.0 g/dL    Hematocrit 34.1 (L) 37.0 - 48.5 %    MCV 79 (L) 82 - 98 fL    MCH 24.4 (L) 27.0 - 31.0 pg    MCHC 30.8 (L) 32.0 - 36.0 g/dL    RDW 17.6 (H) 11.5 - 14.5 %    Platelets 446 (H) 150 - 350 K/uL    MPV 9.5 9.2 - 12.9 fL    Gran # (ANC) 2.8 1.8 - 7.7 K/uL    Lymph # 2.0 1.0 - 4.8 K/uL    Mono # 0.2 (L) 0.3 - 1.0 K/uL    Eos # 0.1 0.0 - 0.5 K/uL    Baso # 0.02 0.00 - 0.20 K/uL    Gran% 55.2 38.0 - 73.0 %    Lymph% 38.7 18.0 - 48.0 %    Mono% 4.7 4.0 - 15.0 %    Eosinophil% 1.0 0.0 - 8.0 %    Basophil% 0.4 0.0 - 1.9 %    Differential Method Automated    Comprehensive metabolic panel   Result Value Ref Range    Sodium 142 136 - 145 mmol/L    Potassium 4.3 3.5 - 5.1 mmol/L     Chloride 110 95 - 110 mmol/L    CO2 21 (L) 23 - 29 mmol/L    Glucose 125 (H) 70 - 110 mg/dL    BUN, Bld 12 6 - 20 mg/dL    Creatinine 0.7 0.5 - 1.4 mg/dL    Calcium 8.8 8.7 - 10.5 mg/dL    Total Protein 6.2 6.0 - 8.4 g/dL    Albumin 3.1 (L) 3.5 - 5.2 g/dL    Total Bilirubin 0.1 0.1 - 1.0 mg/dL    Alkaline Phosphatase 52 (L) 55 - 135 U/L    AST 12 10 - 40 U/L    ALT 8 (L) 10 - 44 U/L    Anion Gap 11 8 - 16 mmol/L    eGFR if African American >60 >60 mL/min/1.73 m^2    eGFR if non African American >60 >60 mL/min/1.73 m^2   TSH   Result Value Ref Range    TSH 0.544 0.400 - 4.000 uIU/mL   Urinalysis, Reflex to Urine Culture Urine, Clean Catch   Result Value Ref Range    Specimen UA Urine, Clean Catch     Color, UA Yellow Yellow, Straw, Kristin    Appearance, UA Clear Clear    pH, UA 6.0 5.0 - 8.0    Specific Gravity, UA <=1.005 (A) 1.005 - 1.030    Protein, UA Negative Negative    Glucose, UA Negative Negative    Ketones, UA Negative Negative    Bilirubin (UA) Negative Negative    Occult Blood UA 2+ (A) Negative    Nitrite, UA Negative Negative    Urobilinogen, UA Negative <2.0 EU/dL    Leukocytes, UA Trace (A) Negative   Drug screen panel, emergency   Result Value Ref Range    Benzodiazepines Negative     Methadone metabolites Negative     Cocaine (Metab.) Presumptive Positive     Opiate Scrn, Ur Negative     Barbiturate Screen, Ur Negative     Amphetamine Screen, Ur Negative     THC Negative     Phencyclidine Negative     Creatinine, Random Ur 43.4 15.0 - 325.0 mg/dL    Toxicology Information SEE COMMENT    Ethanol   Result Value Ref Range    Alcohol, Medical, Serum 28 (H) <10 mg/dL   Acetaminophen level   Result Value Ref Range    Acetaminophen (Tylenol), Serum <3.0 (L) 10.0 - 20.0 ug/mL   Salicylate level   Result Value Ref Range    Salicylate Lvl <5.0 (L) 15.0 - 30.0 mg/dL   Urinalysis Microscopic   Result Value Ref Range    RBC, UA 1 0 - 4 /hpf    WBC, UA 3 0 - 5 /hpf    Bacteria, UA Few (A) None-Occ /hpf    Squam  T[place]irel, UA 3 /hpf    Microscopic Comment SEE COMMENT               The Emergency Provider reviewed the vital signs and test results, which are outlined above.    ED Discussion     3:48 AM: The [facility] hold has been issued by Dr. Garvey at this time for SI.    6:05 AM: Dr. Garvey transfers care of pt to Dr. Wray, pending lab results.    8:50 AM: Pt has been medically cleared by Dr. Wray at this time. Reassessed pt at this time. Pt is resting comfortably and appears in no acute distress. There are no psychiatric services offered at this facility. D/w pt all pertinent ED information and plan to transfer to psychiatric facility for psychiatric treatment. Pt verbalizes understanding. Patient being transferred by [facility] for ongoing personal protection en route. Pt will be transported by personnel trained in CPR and CPI. Patient understands that there could be unforeseen motor vehicle accidents, inclement weather, or loss of vital signs that could result in potential death or permanent disability. All questions and complaints have been addressed at this time. Pt condition is stable at this time and is clear to transfer to psychiatric facility at this time.   Accepting Facility: pending  Accepting Physician: pending      ED Medication(s):  Medications   acetaminophen tablet 650 mg (650 mg Oral Given 3/9/19 0717)             Medical Decision Making    Medical Decision Making:   Clinical Tests:   Lab Tests: Ordered and Reviewed           Scribe Attestation:   Scribe #1: I performed the above scribed service and the documentation accurately describes the services I performed. I attest to the accuracy of the note.    Attending:   Physician Attestation Statement for Scribe #1: I, Faith Garvey MD, personally performed the services described in this documentation, as scribed by Raven Levin, in my presence, and it is both accurate and complete.       Scribe Attestation:   Scribe #2: I performed the above scribed service and the  documentation accurately describes the services I performed. I attest to the accuracy of the note.    Attending Attestation:           Physician Attestation for Scribe:    Physician Attestation Statement for Scribe #2: I, Yuli Gill Do, MD, reviewed documentation, as scribed by Savita Wolfe in my presence, and it is both accurate and complete. I also acknowledge and confirm the content of the note done by Scribe #1.          Clinical Impression       ICD-10-CM ICD-9-CM   1. Depression with suicidal ideation F32.9 311    R45.851 V62.84       Disposition:   Disposition: Transferred  Condition: Stable         Yuli Gill Do, MD  03/09/19 1144       Yuli Gill Do, MD  03/09/19 1144       Yuli Gill Do, MD  03/09/19 1144

## 2019-03-09 NOTE — ED NOTES
[facility] called stating they are short staffed and it will be awhile before they can get here to transport pt.   Stated they will call when they are on their way

## 2019-03-09 NOTE — ED NOTES
Pt sitting in bed with eyes open. States that she has a headache- requesting medication. Denies any other complaints. Notified patient that breakfast tray would be here shortly. Sitter at bedside.

## 2019-03-09 NOTE — ED NOTES
Armband checked, patient verified. Verified by spelling and stated name on armband along with .  Pt states has not been taking her meds because some of it she is out of and not taking it like she is supposed to. States not feeling right. States she is suicidal and when she gets her meds filled, she would take an overdose. Pt with very flat affect, slow to answer.  was at [facility] psych unit in December for suicidal ideations.   Pt smells of alcohol but denies use.  did do small amount of cocaine last night.

## 2019-03-09 NOTE — ED NOTES
[facility] arrived and given pt belongings to place in vehicle. Pt then transported out of facility via wheelchair with two [facility] employees and [facility] and [employer] security

## 2019-03-09 NOTE — ED NOTES
Pt belongings include: Pair black tennis shoes,tapestry bag with wallet with drivers license, numerous credit cards, make up,keys, comb and glasses  ;black tshirt x 2, green pajama pants, navy jacket,robby mouse sleep shirt,shampoo,body wash,socks,underwear,brown and blue tshirt, bra  Medication: Atrovent inhaler x 2, symbicort inhaler, albuterol inhaler, proair inhaler, gabapentin 400mg x2 ,   hydroxine 50mg,benzotropine .5mg,haldol 5mg,l-thyroxine .075mg,pantoprozole 40mg, divalproex 500mg,nicotine patch 21mg,    Empty bottles of quetiapine  And meloxicam,  Placed in [facility] cab 29

## 2019-03-10 PROBLEM — F10.10 ALCOHOL ABUSE: Status: ACTIVE | Noted: 2019-03-10

## 2019-03-10 PROBLEM — R21 RASH AND NONSPECIFIC SKIN ERUPTION: Status: ACTIVE | Noted: 2019-03-10

## 2019-03-10 PROBLEM — J45.909 ASTHMA: Status: ACTIVE | Noted: 2019-03-10

## 2019-03-10 PROBLEM — F19.10 SUBSTANCE ABUSE: Status: ACTIVE | Noted: 2019-03-10

## 2019-03-11 LAB — HIV 1+2 AB+HIV1 P24 AG SERPL QL IA: NEGATIVE

## 2019-03-13 PROBLEM — M25.561 ACUTE PAIN OF RIGHT KNEE: Status: ACTIVE | Noted: 2019-03-13

## 2019-08-06 ENCOUNTER — HOSPITAL ENCOUNTER (EMERGENCY)
Facility: HOSPITAL | Age: 54
Discharge: PSYCHIATRIC OR SUBSTANCE USE FACILITY | End: 2019-08-06
Attending: EMERGENCY MEDICINE
Payer: MEDICARE

## 2019-08-06 VITALS
DIASTOLIC BLOOD PRESSURE: 77 MMHG | OXYGEN SATURATION: 95 % | HEIGHT: 63 IN | HEART RATE: 93 BPM | TEMPERATURE: 98 F | WEIGHT: 151 LBS | SYSTOLIC BLOOD PRESSURE: 129 MMHG | BODY MASS INDEX: 26.75 KG/M2 | RESPIRATION RATE: 18 BRPM

## 2019-08-06 DIAGNOSIS — R45.851 SUICIDAL IDEATIONS: Primary | ICD-10-CM

## 2019-08-06 DIAGNOSIS — S70.01XA CONTUSION OF RIGHT HIP, INITIAL ENCOUNTER: ICD-10-CM

## 2019-08-06 DIAGNOSIS — M25.551 RIGHT HIP PAIN: ICD-10-CM

## 2019-08-06 LAB
ALBUMIN SERPL BCP-MCNC: 3.5 G/DL (ref 3.5–5.2)
ALP SERPL-CCNC: 65 U/L (ref 55–135)
ALT SERPL W/O P-5'-P-CCNC: 19 U/L (ref 10–44)
AMPHET+METHAMPHET UR QL: NEGATIVE
ANION GAP SERPL CALC-SCNC: 10 MMOL/L (ref 8–16)
APAP SERPL-MCNC: <3 UG/ML (ref 10–20)
AST SERPL-CCNC: 22 U/L (ref 10–40)
B-HCG UR QL: NEGATIVE
BACTERIA #/AREA URNS HPF: ABNORMAL /HPF
BARBITURATES UR QL SCN>200 NG/ML: NEGATIVE
BASOPHILS # BLD AUTO: 0.02 K/UL (ref 0–0.2)
BASOPHILS NFR BLD: 0.3 % (ref 0–1.9)
BENZODIAZ UR QL SCN>200 NG/ML: NEGATIVE
BILIRUB SERPL-MCNC: 0.2 MG/DL (ref 0.1–1)
BILIRUB UR QL STRIP: NEGATIVE
BUN SERPL-MCNC: 9 MG/DL (ref 6–20)
BZE UR QL SCN: NORMAL
CALCIUM SERPL-MCNC: 9.2 MG/DL (ref 8.7–10.5)
CANNABINOIDS UR QL SCN: NEGATIVE
CHLORIDE SERPL-SCNC: 108 MMOL/L (ref 95–110)
CLARITY UR: CLEAR
CO2 SERPL-SCNC: 23 MMOL/L (ref 23–29)
COLOR UR: YELLOW
CREAT SERPL-MCNC: 0.7 MG/DL (ref 0.5–1.4)
CREAT UR-MCNC: 122.3 MG/DL (ref 15–325)
DIFFERENTIAL METHOD: ABNORMAL
EOSINOPHIL # BLD AUTO: 0 K/UL (ref 0–0.5)
EOSINOPHIL NFR BLD: 0.4 % (ref 0–8)
ERYTHROCYTE [DISTWIDTH] IN BLOOD BY AUTOMATED COUNT: 19 % (ref 11.5–14.5)
EST. GFR  (AFRICAN AMERICAN): >60 ML/MIN/1.73 M^2
EST. GFR  (NON AFRICAN AMERICAN): >60 ML/MIN/1.73 M^2
ETHANOL SERPL-MCNC: <10 MG/DL
GLUCOSE SERPL-MCNC: 82 MG/DL (ref 70–110)
GLUCOSE UR QL STRIP: NEGATIVE
HCT VFR BLD AUTO: 38.8 % (ref 37–48.5)
HGB BLD-MCNC: 12.4 G/DL (ref 12–16)
HGB UR QL STRIP: NEGATIVE
KETONES UR QL STRIP: NEGATIVE
LEUKOCYTE ESTERASE UR QL STRIP: ABNORMAL
LYMPHOCYTES # BLD AUTO: 2.2 K/UL (ref 1–4.8)
LYMPHOCYTES NFR BLD: 29 % (ref 18–48)
MCH RBC QN AUTO: 24.9 PG (ref 27–31)
MCHC RBC AUTO-ENTMCNC: 32 G/DL (ref 32–36)
MCV RBC AUTO: 78 FL (ref 82–98)
METHADONE UR QL SCN>300 NG/ML: NEGATIVE
MICROSCOPIC COMMENT: ABNORMAL
MONOCYTES # BLD AUTO: 0.4 K/UL (ref 0.3–1)
MONOCYTES NFR BLD: 5.3 % (ref 4–15)
NEUTROPHILS # BLD AUTO: 4.9 K/UL (ref 1.8–7.7)
NEUTROPHILS NFR BLD: 65.3 % (ref 38–73)
NITRITE UR QL STRIP: NEGATIVE
OPIATES UR QL SCN: NEGATIVE
PCP UR QL SCN>25 NG/ML: NEGATIVE
PH UR STRIP: 5 [PH] (ref 5–8)
PLATELET # BLD AUTO: 459 K/UL (ref 150–350)
PMV BLD AUTO: 9.6 FL (ref 9.2–12.9)
POTASSIUM SERPL-SCNC: 3.9 MMOL/L (ref 3.5–5.1)
PROT SERPL-MCNC: 7.1 G/DL (ref 6–8.4)
PROT UR QL STRIP: NEGATIVE
RBC # BLD AUTO: 4.97 M/UL (ref 4–5.4)
RBC #/AREA URNS HPF: 10 /HPF (ref 0–4)
SODIUM SERPL-SCNC: 141 MMOL/L (ref 136–145)
SP GR UR STRIP: >=1.03 (ref 1–1.03)
SQUAMOUS #/AREA URNS HPF: 7 /HPF
TOXICOLOGY INFORMATION: NORMAL
TSH SERPL DL<=0.005 MIU/L-ACNC: 1.86 UIU/ML (ref 0.4–4)
URN SPEC COLLECT METH UR: ABNORMAL
UROBILINOGEN UR STRIP-ACNC: NEGATIVE EU/DL
WBC # BLD AUTO: 7.49 K/UL (ref 3.9–12.7)
WBC #/AREA URNS HPF: 4 /HPF (ref 0–5)

## 2019-08-06 PROCEDURE — 81000 URINALYSIS NONAUTO W/SCOPE: CPT | Mod: 59

## 2019-08-06 PROCEDURE — 80053 COMPREHEN METABOLIC PANEL: CPT

## 2019-08-06 PROCEDURE — 80320 DRUG SCREEN QUANTALCOHOLS: CPT

## 2019-08-06 PROCEDURE — 81025 URINE PREGNANCY TEST: CPT

## 2019-08-06 PROCEDURE — 80329 ANALGESICS NON-OPIOID 1 OR 2: CPT

## 2019-08-06 PROCEDURE — 84443 ASSAY THYROID STIM HORMONE: CPT

## 2019-08-06 PROCEDURE — 80307 DRUG TEST PRSMV CHEM ANLYZR: CPT

## 2019-08-06 PROCEDURE — 85025 COMPLETE CBC W/AUTO DIFF WBC: CPT

## 2019-08-06 PROCEDURE — 99285 EMERGENCY DEPT VISIT HI MDM: CPT

## 2019-08-06 RX ORDER — HALOPERIDOL 5 MG/1
TABLET ORAL
Status: ON HOLD | COMMUNITY
Start: 2018-12-12 | End: 2019-08-16 | Stop reason: HOSPADM

## 2019-08-06 RX ORDER — QUETIAPINE FUMARATE 100 MG/1
TABLET, FILM COATED ORAL
Status: ON HOLD | COMMUNITY
Start: 2018-12-12 | End: 2019-08-16 | Stop reason: HOSPADM

## 2019-08-06 RX ORDER — GABAPENTIN 400 MG/1
800 CAPSULE ORAL
Status: ON HOLD | COMMUNITY
Start: 2018-12-12 | End: 2019-08-16 | Stop reason: HOSPADM

## 2019-08-06 RX ORDER — ALBUTEROL SULFATE 90 UG/1
2 AEROSOL, METERED RESPIRATORY (INHALATION) EVERY 6 HOURS PRN
Status: ON HOLD | COMMUNITY
Start: 2018-12-12 | End: 2019-08-16 | Stop reason: SDUPTHER

## 2019-08-06 RX ORDER — DIVALPROEX SODIUM 500 MG/1
500 TABLET, DELAYED RELEASE ORAL
Status: ON HOLD | COMMUNITY
Start: 2018-12-12 | End: 2019-08-16 | Stop reason: HOSPADM

## 2019-08-06 RX ORDER — PANTOPRAZOLE SODIUM 40 MG/1
40 TABLET, DELAYED RELEASE ORAL
Status: ON HOLD | COMMUNITY
Start: 2018-12-13 | End: 2019-08-16 | Stop reason: HOSPADM

## 2019-08-06 RX ORDER — LEVOTHYROXINE SODIUM 75 UG/1
75 TABLET ORAL
Status: ON HOLD | COMMUNITY
Start: 2018-12-13 | End: 2019-08-16 | Stop reason: HOSPADM

## 2019-08-06 NOTE — ED NOTES
Patient sleeping in bed, no acute distress noted, easily aroused with verbal stimuli. When awake, pt is alert, and oriented x 3, calm, respirations even and unlabored, and skin is warm and dry. Patient verbalized understanding of status and plan of care. Patient denies needs at this time. Lis Gibbs, at bedside. Will continue to monitor.

## 2019-08-06 NOTE — ED PROVIDER NOTES
"SCRIBE #1 NOTE: I, Galina Bolanos, am scribing for, and in the presence of, Pan Gan Jr., MD. I have scribed the entire note.       History     Chief Complaint   Patient presents with    Suicidal     pt states, "I don't want to live anymore...I haven't been taking my medications."     Review of patient's allergies indicates:   Allergen Reactions    Beef containing products      Indigestion, no anaphylaxis    Citrus and derivatives      Indigestion, no anaphylaxis    Methadone Other (See Comments)     "I'm not myself."    Tomato      Indigestion, no anaphylaxis         History of Present Illness     HPI    8/6/2019, 12:52 PM  History obtained from the patient      History of Present Illness: Jo-Ann Wray is a 54 y.o. female patient with a PMHx of bipolar 1 disorder, hypothyroid, anxiety, COPD, anxiety, asthma, alcohol abuse, depression, and psychosis who presents to the Emergency Department for evaluation of SI. When entering the room, pt states "I don't want to live anymore." She reports she has not taken her medications for a couple of weeks. Pt admits to alcohol use and states that she smoked crack yesterday. Pt is also c/o R hip pain which onset months ago. Symptoms are constant and moderate in severity. No mitigating or exacerbating factors reported. No associated sxs included. Patient denies any fever, chills, SOB, CP, n/v, gait problem, dizziness, extremity weakness/numbness, auditory/visual hallucinations, sleep disturbance, HI, and all other sxs at this time. No prior Tx. No further complaints or concerns at this time.       Arrival mode: Police/nursing home Transportation    PCP: Primary Doctor No        Past Medical History:  Past Medical History:   Diagnosis Date    Addiction to drug     Alcohol abuse     Anxiety     Asthma     Bipolar 1 disorder     COPD (chronic obstructive pulmonary disease)     Depression     Fatigue     History of psychiatric hospitalization     Hx of " "psychiatric care     Hypothyroid     Psychiatric problem     Depression, Anxiety, Suicidal Ideations    Psychosis     hx of drug induced psychosis    Sleep difficulties     Suicide attempt     , 2013 - OD on "medications"    Therapy     [facility]    Withdrawal symptoms, alcohol     Withdrawal symptoms, drug or narcotic        Past Surgical History:  Past Surgical History:   Procedure Laterality Date    CARPAL TUNNEL RELEASE       SECTION      GASTRIC BYPASS           Family History:  Family History   Problem Relation Age of Onset    Dementia Mother     Heart attack Mother     Stroke Mother        Social History:  Social History     Tobacco Use    Smoking status: Current Every Day Smoker     Packs/day: 0.50     Types: Cigarettes    Smokeless tobacco: Never Used   Substance and Sexual Activity    Alcohol use: Yes     Alcohol/week: 2.4 - 3.0 oz     Types: 4 - 5 Cans of beer per week     Comment: "a couple times a month"    Drug use: Yes     Types: Cocaine     Comment: used this morning     Sexual activity: Not Currently     Partners: Male        Review of Systems     Review of Systems   Constitutional: Negative for chills and fever.   HENT: Negative for rhinorrhea and sore throat.    Respiratory: Negative for cough and shortness of breath.    Cardiovascular: Negative for chest pain and leg swelling.   Gastrointestinal: Negative for abdominal pain, nausea and vomiting.   Genitourinary: Negative for dysuria, frequency and urgency.   Musculoskeletal: Positive for arthralgias (R hip). Negative for gait problem.   Neurological: Negative for dizziness.   Psychiatric/Behavioral: Positive for suicidal ideas. Negative for hallucinations (auditory/visual) and sleep disturbance.        (+) alcohol use  (+) drug use  (-) HI   All other systems reviewed and are negative.       Physical Exam     Initial Vitals [19 1242]   BP Pulse Resp Temp SpO2   123/70 107 20 98.6 " "°F (37 °C) 96 %      MAP       --          Physical Exam  Nursing Notes and Vital Signs Reviewed.  Constitutional: Patient is in no acute distress. Well-developed and well-nourished.  Head: Atraumatic. Normocephalic.  Eyes: PERRL. EOM intact. Conjunctivae are not pale. No scleral icterus.  ENT: Mucous membranes are moist. Oropharynx is clear and symmetric.    Neck: Supple. Full ROM. No lymphadenopathy.  Cardiovascular: Regular rate. Regular rhythm. No murmurs, rubs, or gallops. Distal pulses are 2+ and symmetric.  Pulmonary/Chest: No respiratory distress. Clear to auscultation bilaterall. No wheezing or rales.  Abdominal: Soft and non-distended.  There is no tenderness.  No rebound, guarding, or rigidity.  Musculoskeletal: Moves all extremities. No obvious deformities. No edema. No calf tenderness.  RLE: no evident deformity. Mild tenderness to palpation over R hip. Old bruising noted. ROM is normal. NVI distal to injury. Cap refill distally is <2 seconds. DP and PT pulses are equal and 2+ bilaterally. No motor deficit. No distal sensory deficit.  Skin: Warm and dry.  Neurological:  Alert, awake, and appropriate.  GCS 15.  Normal speech.  No acute focal neurological deficits are appreciated.  Psychiatric:               Behavior: normal, cooperative              Mood and Affect: flat affect              Thought Process: tangential              Suicidal Ideations: Yes              Suicidal Plan: No specific plan to harm self              Homicidal Ideations: No              Hallucinations: none     ED Course   Procedures  ED Vital Signs:  Vitals:    08/06/19 1242   BP: 123/70   Pulse: 107   Resp: 20   Temp: 98.6 °F (37 °C)   TempSrc: Oral   SpO2: 96%   Weight: 68.5 kg (151 lb)   Height: 5' 3" (1.6 m)       Abnormal Lab Results:  Labs Reviewed   CBC W/ AUTO DIFFERENTIAL - Abnormal; Notable for the following components:       Result Value    Mean Corpuscular Volume 78 (*)     Mean Corpuscular Hemoglobin 24.9 (*)     RDW " 19.0 (*)     Platelets 459 (*)     All other components within normal limits   URINALYSIS, REFLEX TO URINE CULTURE - Abnormal; Notable for the following components:    Specific Gravity, UA >=1.030 (*)     Leukocytes, UA 1+ (*)     All other components within normal limits    Narrative:     Preferred Collection Type->Urine, Clean Catch   ACETAMINOPHEN LEVEL - Abnormal; Notable for the following components:    Acetaminophen (Tylenol), Serum <3.0 (*)     All other components within normal limits   URINALYSIS MICROSCOPIC - Abnormal; Notable for the following components:    RBC, UA 10 (*)     All other components within normal limits    Narrative:     Preferred Collection Type->Urine, Clean Catch   COMPREHENSIVE METABOLIC PANEL   TSH   DRUG SCREEN PANEL, URINE EMERGENCY    Narrative:     Preferred Collection Type->Urine, Clean Catch   ALCOHOL,MEDICAL (ETHANOL)   PREGNANCY TEST, URINE RAPID        All Lab Results:  Results for orders placed or performed during the hospital encounter of 08/06/19   CBC auto differential   Result Value Ref Range    WBC 7.49 3.90 - 12.70 K/uL    RBC 4.97 4.00 - 5.40 M/uL    Hemoglobin 12.4 12.0 - 16.0 g/dL    Hematocrit 38.8 37.0 - 48.5 %    Mean Corpuscular Volume 78 (L) 82 - 98 fL    Mean Corpuscular Hemoglobin 24.9 (L) 27.0 - 31.0 pg    Mean Corpuscular Hemoglobin Conc 32.0 32.0 - 36.0 g/dL    RDW 19.0 (H) 11.5 - 14.5 %    Platelets 459 (H) 150 - 350 K/uL    MPV 9.6 9.2 - 12.9 fL    Gran # (ANC) 4.9 1.8 - 7.7 K/uL    Lymph # 2.2 1.0 - 4.8 K/uL    Mono # 0.4 0.3 - 1.0 K/uL    Eos # 0.0 0.0 - 0.5 K/uL    Baso # 0.02 0.00 - 0.20 K/uL    Gran% 65.3 38.0 - 73.0 %    Lymph% 29.0 18.0 - 48.0 %    Mono% 5.3 4.0 - 15.0 %    Eosinophil% 0.4 0.0 - 8.0 %    Basophil% 0.3 0.0 - 1.9 %    Differential Method Automated    Comprehensive metabolic panel   Result Value Ref Range    Sodium 141 136 - 145 mmol/L    Potassium 3.9 3.5 - 5.1 mmol/L    Chloride 108 95 - 110 mmol/L    CO2 23 23 - 29 mmol/L     Glucose 82 70 - 110 mg/dL    BUN, Bld 9 6 - 20 mg/dL    Creatinine 0.7 0.5 - 1.4 mg/dL    Calcium 9.2 8.7 - 10.5 mg/dL    Total Protein 7.1 6.0 - 8.4 g/dL    Albumin 3.5 3.5 - 5.2 g/dL    Total Bilirubin 0.2 0.1 - 1.0 mg/dL    Alkaline Phosphatase 65 55 - 135 U/L    AST 22 10 - 40 U/L    ALT 19 10 - 44 U/L    Anion Gap 10 8 - 16 mmol/L    eGFR if African American >60 >60 mL/min/1.73 m^2    eGFR if non African American >60 >60 mL/min/1.73 m^2   TSH   Result Value Ref Range    TSH 1.860 0.400 - 4.000 uIU/mL   Urinalysis, Reflex to Urine Culture Urine, Clean Catch   Result Value Ref Range    Specimen UA Urine, Clean Catch     Color, UA Yellow Yellow, Straw, Kristin    Appearance, UA Clear Clear    pH, UA 5.0 5.0 - 8.0    Specific Gravity, UA >=1.030 (A) 1.005 - 1.030    Protein, UA Negative Negative    Glucose, UA Negative Negative    Ketones, UA Negative Negative    Bilirubin (UA) Negative Negative    Occult Blood UA Negative Negative    Nitrite, UA Negative Negative    Urobilinogen, UA Negative <2.0 EU/dL    Leukocytes, UA 1+ (A) Negative   Drug screen panel, emergency   Result Value Ref Range    Benzodiazepines Negative     Methadone metabolites Negative     Cocaine (Metab.) Presumptive Positive     Opiate Scrn, Ur Negative     Barbiturate Screen, Ur Negative     Amphetamine Screen, Ur Negative     THC Negative     Phencyclidine Negative     Creatinine, Random Ur 122.3 15.0 - 325.0 mg/dL    Toxicology Information SEE COMMENT    Ethanol   Result Value Ref Range    Alcohol, Medical, Serum <10 <10 mg/dL   Acetaminophen level   Result Value Ref Range    Acetaminophen (Tylenol), Serum <3.0 (L) 10.0 - 20.0 ug/mL   Pregnancy, urine rapid   Result Value Ref Range    Preg Test, Ur Negative    Urinalysis Microscopic   Result Value Ref Range    RBC, UA 10 (H) 0 - 4 /hpf    WBC, UA 4 0 - 5 /hpf    Bacteria Rare None-Occ /hpf    Squam Epithel, UA 7 /hpf    Microscopic Comment SEE COMMENT          Imaging Results:  Imaging Results           X-Ray Hip 2 View Right (Final result)  Result time 08/06/19 13:58:46    Final result by Eloy Sykes MD (08/06/19 13:58:46)                 Impression:      Normal right hip.      Electronically signed by: Eloy Sykes MD  Date:    08/06/2019  Time:    13:58             Narrative:    EXAMINATION:  XR HIP 2 VIEW RIGHT    CLINICAL HISTORY:  Pain in right hip    TECHNIQUE:  AP view of the pelvis and frog leg lateral view of the right hip were performed.    COMPARISON:  None    FINDINGS:  Joint space appears normal.  No osseous injury/fracture.                                   The Emergency Provider reviewed the vital signs and test results, which are outlined above.     ED Discussion     1:02 PM: The [facility] hold has been issued by Dr. Gan at this time for SI.    2:26 PM: Pt has been medically cleared by Dr. Gan at this time. Reassessed pt at this time. Pt is resting comfortably and appears in no acute distress. There are no psychiatric services offered at this facility. D/w pt all pertinent ED information and plan to transfer to psychiatric facility for psychiatric treatment. Pt verbalizes understanding. Patient being transferred by [facility] for ongoing personal protection en route. Pt has been made aware of all risks and benefits associated with transfer, including but not limited to death, MVC, loss of vital signs, and/or permanent disability. Benefits include ability to be treated at an inpatient psychiatric facility. Pt will be transported by personnel trained in CPR and CPI. Patient understands that there could be unforeseen motor veicle accidents, inclement weather, or loss of vital signs that could result in potential death or permanent disability. All questions and complaints have been addressed at this time. Pt condition is stable at this time and is clear to transfer to psychiatric facility at this time.     4:35 PM: Pt was accepted at [facility] for the service of Dr. Benjamin. Pt is ready for  transfer.    ED Medication(s):  Medications - No data to display    New Prescriptions    No medications on file       Contact information for after-discharge care     [facility] .    Service:  Mental Health Hospital Treatment  Contact information:  [address] [place] [address]                             Medical Decision Making:   Clinical Tests:   Lab Tests: Ordered and Reviewed  Radiological Study: Ordered and Reviewed             Scribe Attestation:   Scribe #1: I performed the above scribed service and the documentation accurately describes the services I performed. I attest to the accuracy of the note.     Attending:   Physician Attestation Statement for Scribe #1: I, Pan Gan Jr., MD, personally performed the services described in this documentation, as scribed by Galina Bolanos, in my presence, and it is both accurate and complete.           Clinical Impression       ICD-10-CM ICD-9-CM   1. Suicidal ideations R45.851 V62.84   2. Right hip pain M25.551 719.45   3. Contusion of right hip, initial encounter S70.01XA 924.01       Disposition:   Disposition: Transferred  Condition: Stable         Pan Gan Jr., MD  08/06/19 8560

## 2019-08-06 NOTE — ED NOTES
Relieved Wally from sitting at this time. Pt is resting quietly in bed with eyes closed, RR e/u, NAD noted, states no current needs.

## 2019-08-06 NOTE — ED NOTES
Patient sitting up in bed, no acute distress noted, awake, alert, and oriented x 3, calm, respirations even and unlabored, and skin is warm and dry. Patient verbalized understanding of status and plan of care. Patient denies needs at this time. Wally Gibbs, at bedside. Will continue to monitor.

## 2019-08-06 NOTE — ED NOTES
Pt states she is tired of living and doesn't want to be here anymore; states she has no plan. Admits to using crack cocaine and states she usually gets suicidal after drug use.    Pt changed into gray gown and yellow socks.     Pt belonging are as follows:  1 pair of pants, 1 shirt, and one pair of underwear.   1 bag with various clothes items and loose change.     Belongings placed in [facility] locker 27.

## 2019-08-07 PROBLEM — T21.11XA: Status: ACTIVE | Noted: 2019-08-07

## 2019-08-07 NOTE — ED NOTES
SPD at bedside. Pt transported off unit via wheelchair, accompanied by 2 transport personnel and security.

## 2019-09-24 ENCOUNTER — HOSPITAL ENCOUNTER (EMERGENCY)
Facility: HOSPITAL | Age: 54
Discharge: HOME | End: 2019-09-24
Attending: EMERGENCY MEDICINE
Payer: MEDICARE

## 2019-09-24 VITALS
SYSTOLIC BLOOD PRESSURE: 112 MMHG | HEIGHT: 63 IN | TEMPERATURE: 98 F | WEIGHT: 152.13 LBS | RESPIRATION RATE: 16 BRPM | DIASTOLIC BLOOD PRESSURE: 58 MMHG | HEART RATE: 58 BPM | OXYGEN SATURATION: 97 % | BODY MASS INDEX: 26.95 KG/M2

## 2019-09-24 DIAGNOSIS — A59.9 TRICHOMONAL INFECTION: ICD-10-CM

## 2019-09-24 DIAGNOSIS — N30.90 CYSTITIS: Primary | ICD-10-CM

## 2019-09-24 LAB
BACTERIA #/AREA URNS HPF: ABNORMAL /HPF
BILIRUB UR QL STRIP: NEGATIVE
CLARITY UR: CLEAR
COLOR UR: YELLOW
GLUCOSE UR QL STRIP: NEGATIVE
HGB UR QL STRIP: ABNORMAL
KETONES UR QL STRIP: NEGATIVE
LEUKOCYTE ESTERASE UR QL STRIP: ABNORMAL
MICROSCOPIC COMMENT: ABNORMAL
NITRITE UR QL STRIP: NEGATIVE
PH UR STRIP: 7 [PH] (ref 5–8)
PROT UR QL STRIP: NEGATIVE
RBC #/AREA URNS HPF: 3 /HPF (ref 0–4)
SP GR UR STRIP: 1.01 (ref 1–1.03)
TRICHOMONAS UR QL MICRO: ABNORMAL
URN SPEC COLLECT METH UR: ABNORMAL
UROBILINOGEN UR STRIP-ACNC: NEGATIVE EU/DL
WBC #/AREA URNS HPF: 10 /HPF (ref 0–5)

## 2019-09-24 PROCEDURE — 81000 URINALYSIS NONAUTO W/SCOPE: CPT

## 2019-09-24 PROCEDURE — 99284 EMERGENCY DEPT VISIT MOD MDM: CPT

## 2019-09-24 RX ORDER — NALTREXONE HYDROCHLORIDE 50 MG/1
50 TABLET, FILM COATED ORAL DAILY
Status: ON HOLD | COMMUNITY
End: 2019-10-19

## 2019-09-24 RX ORDER — MIRTAZAPINE 15 MG/1
15 TABLET, FILM COATED ORAL NIGHTLY
Status: ON HOLD | COMMUNITY
End: 2019-10-19

## 2019-09-24 RX ORDER — CIPROFLOXACIN 250 MG/1
250 TABLET, FILM COATED ORAL 2 TIMES DAILY
Qty: 10 TABLET | Refills: 0 | Status: SHIPPED | OUTPATIENT
Start: 2019-09-24 | End: 2019-09-29

## 2019-09-24 RX ORDER — HYDROXYZINE PAMOATE 25 MG/1
25 CAPSULE ORAL 4 TIMES DAILY
Status: ON HOLD | COMMUNITY
End: 2019-10-23 | Stop reason: HOSPADM

## 2019-09-24 RX ORDER — METRONIDAZOLE 500 MG/1
500 TABLET ORAL EVERY 12 HOURS
Qty: 14 TABLET | Refills: 0 | Status: SHIPPED | OUTPATIENT
Start: 2019-09-24 | End: 2019-10-01

## 2019-09-24 NOTE — ED PROVIDER NOTES
"SCRIBE #1 NOTE: I, Dayana Leslie, am scribing for, and in the presence of, Keli Small MD. I have scribed the entire note.       History     Chief Complaint   Patient presents with    Dysuria     pt c/o dysuria since last week     Review of patient's allergies indicates:   Allergen Reactions    Beef containing products      Indigestion, no anaphylaxis    Citrus and derivatives      Indigestion, no anaphylaxis    Methadone Other (See Comments)     "I'm not myself."    Tomato      Indigestion, no anaphylaxis         History of Present Illness     HPI    9/24/2019, 9:26 AM  History obtained from the patient      History of Present Illness: Jo-Ann Wray is a 54 y.o. female patient with a PMHx of anxiety, asthma, bipolar 1 disorder, and COPD who presents to the Emergency Department for evaluation of dysuria which onset gradually 1 week ago. Symptoms are episodic and moderate in severity. No mitigating or exacerbating factors reported. Associated sxs include lower abdominal pain. Patient denies any hematuria, urinary frequency/urgency, vaginal bleeding, vaginal discharge, fever, chills, flank pain, and all other sxs at this time. No further complaints or concerns at this time.         Arrival mode: EMS    PCP: Primary Doctor No        Past Medical History:  Past Medical History:   Diagnosis Date    Addiction to drug     Alcohol abuse     Anxiety     Asthma     Bipolar 1 disorder     COPD (chronic obstructive pulmonary disease)     Depression     Fatigue     History of psychiatric hospitalization     Hx of psychiatric care     Hypothyroid     Psychiatric problem     Depression, Anxiety, Suicidal Ideations    Psychosis     hx of drug induced psychosis    Sleep difficulties     Suicide attempt     2005, 2013 - OD on "medications"    Therapy     [facility]    Withdrawal symptoms, alcohol     Withdrawal symptoms, drug or narcotic        Past Surgical " "History:  Past Surgical History:   Procedure Laterality Date    CARPAL TUNNEL RELEASE       SECTION      GASTRIC BYPASS           Family History:  Family History   Problem Relation Age of Onset    Dementia Mother     Heart attack Mother     Stroke Mother        Social History:  Social History     Tobacco Use    Smoking status: Current Every Day Smoker     Packs/day: 0.50     Types: Cigarettes    Smokeless tobacco: Never Used   Substance and Sexual Activity    Alcohol use: Yes     Alcohol/week: 4.0 - 5.0 standard drinks     Types: 4 - 5 Cans of beer per week     Comment: "a couple times a month"    Drug use: Yes     Types: Cocaine     Comment: used this morning     Sexual activity: Not Currently     Partners: Male        Review of Systems     Review of Systems   Constitutional: Negative for activity change, appetite change, chills, diaphoresis, fatigue and fever.   HENT: Negative for congestion, ear pain, nosebleeds, rhinorrhea, sinus pain, sore throat and trouble swallowing.    Eyes: Negative for pain and discharge.   Respiratory: Negative for cough, chest tightness, shortness of breath, wheezing and stridor.    Cardiovascular: Negative for chest pain, palpitations and leg swelling.   Gastrointestinal: Positive for abdominal pain (lower). Negative for abdominal distention, blood in stool, constipation, diarrhea, nausea and vomiting.   Genitourinary: Positive for dysuria. Negative for difficulty urinating, flank pain, frequency, hematuria, urgency, vaginal bleeding, vaginal discharge and vaginal pain.   Musculoskeletal: Negative for arthralgias, back pain, myalgias and neck pain.   Skin: Negative for pallor, rash and wound.   Neurological: Negative for dizziness, syncope, weakness, light-headedness, numbness and headaches.   Hematological: Does not bruise/bleed easily.   Psychiatric/Behavioral: Negative for confusion and self-injury.   All other systems reviewed and are negative.     Physical Exam " "    Initial Vitals [09/24/19 0909]   BP Pulse Resp Temp SpO2   135/67 88 16 97.8 °F (36.6 °C) 98 %      MAP       --          Physical Exam  Nursing Notes and Vital Signs Reviewed.  Constitutional: Patient is in no acute distress. Well-developed and well-nourished.  Head: Atraumatic. Normocephalic.  Eyes: PERRL. EOM intact. Conjunctivae are not pale. No scleral icterus.  ENT: Mucous membranes are moist. Oropharynx is clear and symmetric.    Neck: Supple. Full ROM. No lymphadenopathy.  Cardiovascular: Regular rate. Regular rhythm. No murmurs, rubs, or gallops. Distal pulses are 2+ and symmetric.  Pulmonary/Chest: No respiratory distress. Clear to auscultation bilaterally. No wheezing or rales.  Abdominal: Soft and non-distended.  There is no tenderness.  No rebound, guarding, or rigidity. Good bowel sounds.  Genitourinary: No CVA tenderness  Musculoskeletal: Moves all extremities. No obvious deformities. No edema. No calf tenderness.  Skin: Warm and dry.  Neurological:  Alert, awake, and appropriate.  Normal speech.  No acute focal neurological deficits are appreciated.  Psychiatric: Normal affect. Good eye contact. Appropriate in content.     ED Course   Procedures  ED Vital Signs:  Vitals:    09/24/19 0909 09/24/19 0917 09/24/19 0920   BP: 135/67 122/60    Pulse: 88 79    Resp: 16     Temp: 97.8 °F (36.6 °C)     TempSrc: Oral     SpO2: 98% 99%    Weight:   69 kg (152 lb 1.9 oz)   Height:   5' 3" (1.6 m)       Abnormal Lab Results:  Labs Reviewed   URINALYSIS, REFLEX TO URINE CULTURE - Abnormal; Notable for the following components:       Result Value    Occult Blood UA Trace (*)     Leukocytes, UA 3+ (*)     All other components within normal limits    Narrative:     Preferred Collection Type->Urine, Clean Catch   URINALYSIS MICROSCOPIC - Abnormal; Notable for the following components:    WBC, UA 10 (*)     Trichomonas, UA Rare (*)     All other components within normal limits    Narrative:     Preferred Collection " Type->Urine, Clean Catch        All Lab Results:  Results for orders placed or performed during the hospital encounter of 09/24/19   Urinalysis, Reflex to Urine Culture Urine, Clean Catch   Result Value Ref Range    Specimen UA Urine, Clean Catch     Color, UA Yellow Yellow, Straw, Kristin    Appearance, UA Clear Clear    pH, UA 7.0 5.0 - 8.0    Specific Gravity, UA 1.010 1.005 - 1.030    Protein, UA Negative Negative    Glucose, UA Negative Negative    Ketones, UA Negative Negative    Bilirubin (UA) Negative Negative    Occult Blood UA Trace (A) Negative    Nitrite, UA Negative Negative    Urobilinogen, UA Negative <2.0 EU/dL    Leukocytes, UA 3+ (A) Negative   Urinalysis Microscopic   Result Value Ref Range    RBC, UA 3 0 - 4 /hpf    WBC, UA 10 (H) 0 - 5 /hpf    Bacteria Occasional None-Occ /hpf    Trichomonas, UA Rare (A) None    Microscopic Comment SEE COMMENT               The Emergency Provider reviewed the vital signs and test results, which are outlined above.     ED Discussion     10:15 AM: Reassessed pt at this time.  Pt requesting a sandwich at this time. Discussed with pt all pertinent ED information and results. Discussed pt dx and plan of tx. Gave pt all f/u and return to the ED instructions. All questions and concerns were addressed at this time. Pt expresses understanding of information and instructions, and is comfortable with plan to discharge. Pt is stable for discharge.    I discussed with patient that evaluation in the ED does not suggest any emergent or life threatening medical conditions requiring immediate intervention beyond what was provided in the ED, and I believe patient is safe for discharge.  Regardless, an unremarkable evaluation in the ED does not preclude the development or presence of a serious of life threatening condition. As such, patient was instructed to return immediately for any worsening or change in current symptoms.         Medical Decision Making:   Clinical Tests:   Lab  Tests: Ordered and Reviewed           ED Medication(s):  Medications - No data to display    New Prescriptions    CIPROFLOXACIN HCL (CIPRO) 250 MG TABLET    Take 1 tablet (250 mg total) by mouth 2 (two) times daily. for 5 days    METRONIDAZOLE (FLAGYL) 500 MG TABLET    Take 1 tablet (500 mg total) by mouth every 12 (twelve) hours. for 7 days       Follow-up [facility] Schedule an appointment as soon as possible for a visit in 2 days.    Why:  Return to the Emergency Room, If symptoms worsen  Contact information:  [address] [facility] [address]  [phone]                       Scribe Attestation:   Scribe #1: I performed the above scribed service and the documentation accurately describes the services I performed. I attest to the accuracy of the note.     Attending:   Physician Attestation Statement for Scribe #1: I, Keli Small MD, personally performed the services described in this documentation, as scribed by Dayana Leslie, in my presence, and it is both accurate and complete.           Clinical Impression       ICD-10-CM ICD-9-CM   1. Cystitis N30.90 595.9   2. Trichomonal infection A59.9 131.9       Disposition:   Disposition: Discharged  Condition: Stable         Keli Small MD  09/24/19 1150

## 2019-09-24 NOTE — ED NOTES
Pt ready for dc per provider, in NAD, VSS, RR equal and unlabored.  Pt given sandwich and juice per request.

## 2019-10-18 ENCOUNTER — HOSPITAL ENCOUNTER (EMERGENCY)
Facility: HOSPITAL | Age: 54
Discharge: PSYCHIATRIC OR SUBSTANCE USE FACILITY | End: 2019-10-18
Attending: EMERGENCY MEDICINE
Payer: MEDICARE

## 2019-10-18 VITALS
RESPIRATION RATE: 18 BRPM | OXYGEN SATURATION: 99 % | HEART RATE: 85 BPM | SYSTOLIC BLOOD PRESSURE: 135 MMHG | TEMPERATURE: 98 F | DIASTOLIC BLOOD PRESSURE: 68 MMHG

## 2019-10-18 DIAGNOSIS — R45.851 SUICIDAL THOUGHTS: Primary | ICD-10-CM

## 2019-10-18 DIAGNOSIS — R45.851 DEPRESSION WITH SUICIDAL IDEATION: ICD-10-CM

## 2019-10-18 DIAGNOSIS — F32.A DEPRESSION WITH SUICIDAL IDEATION: ICD-10-CM

## 2019-10-18 DIAGNOSIS — F19.10 SUBSTANCE ABUSE: ICD-10-CM

## 2019-10-18 PROBLEM — F32.2 MDD (MAJOR DEPRESSIVE DISORDER), SEVERE: Status: ACTIVE | Noted: 2019-10-18

## 2019-10-18 LAB
ALBUMIN SERPL BCP-MCNC: 3.5 G/DL (ref 3.5–5.2)
ALP SERPL-CCNC: 69 U/L (ref 55–135)
ALT SERPL W/O P-5'-P-CCNC: 16 U/L (ref 10–44)
AMPHET+METHAMPHET UR QL: NEGATIVE
ANION GAP SERPL CALC-SCNC: 12 MMOL/L (ref 8–16)
AST SERPL-CCNC: 21 U/L (ref 10–40)
B-HCG UR QL: NEGATIVE
BARBITURATES UR QL SCN>200 NG/ML: NEGATIVE
BASOPHILS # BLD AUTO: 0.03 K/UL (ref 0–0.2)
BASOPHILS NFR BLD: 0.4 % (ref 0–1.9)
BENZODIAZ UR QL SCN>200 NG/ML: NEGATIVE
BILIRUB SERPL-MCNC: 0.2 MG/DL (ref 0.1–1)
BILIRUB UR QL STRIP: NEGATIVE
BUN SERPL-MCNC: 22 MG/DL (ref 6–20)
BZE UR QL SCN: NORMAL
CALCIUM SERPL-MCNC: 9.3 MG/DL (ref 8.7–10.5)
CANNABINOIDS UR QL SCN: NORMAL
CHLORIDE SERPL-SCNC: 106 MMOL/L (ref 95–110)
CLARITY UR: CLEAR
CO2 SERPL-SCNC: 21 MMOL/L (ref 23–29)
COLOR UR: YELLOW
CREAT SERPL-MCNC: 0.7 MG/DL (ref 0.5–1.4)
CREAT UR-MCNC: 113.8 MG/DL (ref 15–325)
DIFFERENTIAL METHOD: ABNORMAL
EOSINOPHIL # BLD AUTO: 0.1 K/UL (ref 0–0.5)
EOSINOPHIL NFR BLD: 1.1 % (ref 0–8)
ERYTHROCYTE [DISTWIDTH] IN BLOOD BY AUTOMATED COUNT: 18.3 % (ref 11.5–14.5)
EST. GFR  (AFRICAN AMERICAN): >60 ML/MIN/1.73 M^2
EST. GFR  (NON AFRICAN AMERICAN): >60 ML/MIN/1.73 M^2
ETHANOL SERPL-MCNC: <10 MG/DL
GLUCOSE SERPL-MCNC: 120 MG/DL (ref 70–110)
GLUCOSE UR QL STRIP: NEGATIVE
HCT VFR BLD AUTO: 40.6 % (ref 37–48.5)
HGB BLD-MCNC: 12.1 G/DL (ref 12–16)
HGB UR QL STRIP: NEGATIVE
IMM GRANULOCYTES # BLD AUTO: 0.02 K/UL (ref 0–0.04)
IMM GRANULOCYTES NFR BLD AUTO: 0.3 % (ref 0–0.5)
KETONES UR QL STRIP: NEGATIVE
LEUKOCYTE ESTERASE UR QL STRIP: NEGATIVE
LYMPHOCYTES # BLD AUTO: 3 K/UL (ref 1–4.8)
LYMPHOCYTES NFR BLD: 40 % (ref 18–48)
MCH RBC QN AUTO: 23.7 PG (ref 27–31)
MCHC RBC AUTO-ENTMCNC: 29.8 G/DL (ref 32–36)
MCV RBC AUTO: 80 FL (ref 82–98)
METHADONE UR QL SCN>300 NG/ML: NEGATIVE
MONOCYTES # BLD AUTO: 0.4 K/UL (ref 0.3–1)
MONOCYTES NFR BLD: 5.8 % (ref 4–15)
NEUTROPHILS # BLD AUTO: 4 K/UL (ref 1.8–7.7)
NEUTROPHILS NFR BLD: 52.4 % (ref 38–73)
NITRITE UR QL STRIP: NEGATIVE
NRBC BLD-RTO: 0 /100 WBC
OPIATES UR QL SCN: NEGATIVE
PCP UR QL SCN>25 NG/ML: NEGATIVE
PH UR STRIP: 6 [PH] (ref 5–8)
PLATELET # BLD AUTO: 450 K/UL (ref 150–350)
PMV BLD AUTO: 9.8 FL (ref 9.2–12.9)
POTASSIUM SERPL-SCNC: 4.1 MMOL/L (ref 3.5–5.1)
PROT SERPL-MCNC: 7.3 G/DL (ref 6–8.4)
PROT UR QL STRIP: NEGATIVE
RBC # BLD AUTO: 5.11 M/UL (ref 4–5.4)
SODIUM SERPL-SCNC: 139 MMOL/L (ref 136–145)
SP GR UR STRIP: >=1.03 (ref 1–1.03)
TOXICOLOGY INFORMATION: NORMAL
TSH SERPL DL<=0.005 MIU/L-ACNC: 1.54 UIU/ML (ref 0.4–4)
URN SPEC COLLECT METH UR: ABNORMAL
UROBILINOGEN UR STRIP-ACNC: NEGATIVE EU/DL
WBC # BLD AUTO: 7.55 K/UL (ref 3.9–12.7)

## 2019-10-18 PROCEDURE — 80320 DRUG SCREEN QUANTALCOHOLS: CPT

## 2019-10-18 PROCEDURE — G0425 INPT/ED TELECONSULT30: HCPCS | Mod: GT,,, | Performed by: PSYCHIATRY & NEUROLOGY

## 2019-10-18 PROCEDURE — 80307 DRUG TEST PRSMV CHEM ANLYZR: CPT

## 2019-10-18 PROCEDURE — 81003 URINALYSIS AUTO W/O SCOPE: CPT | Mod: 59

## 2019-10-18 PROCEDURE — G0425 PR INPT TELEHEALTH CONSULT 30M: ICD-10-PCS | Mod: GT,,, | Performed by: PSYCHIATRY & NEUROLOGY

## 2019-10-18 PROCEDURE — 84443 ASSAY THYROID STIM HORMONE: CPT

## 2019-10-18 PROCEDURE — 80053 COMPREHEN METABOLIC PANEL: CPT

## 2019-10-18 PROCEDURE — 85025 COMPLETE CBC W/AUTO DIFF WBC: CPT

## 2019-10-18 PROCEDURE — 81025 URINE PREGNANCY TEST: CPT

## 2019-10-18 PROCEDURE — 99285 EMERGENCY DEPT VISIT HI MDM: CPT

## 2019-10-18 NOTE — CONSULTS
[facility]  Psychiatry  Telepsychiatry Consult Note    Please see previous notes:    Patient agreeable to consultation via telepsychiatry.    Tele-Consultation from Psychiatry started: 10/18/2019 at 525 pm  The chief complaint leading to psychiatric consultation is: suicidal ideation  This consultation was requested by Dr. Rolando Small, the Emergency Department attending physician.  The location of the consulting psychiatrist is [facility].  The patient location is  [facility] EMERGENCY DEPARTMENT   The patient arrived at the ED at: this afternoon    Also present with the patient at the time of the consultation: tech    Patient Identification:   Jo-Ann Wray is a 54 y.o. female.    Patient information was obtained from patient and past medical records.  Patient presented voluntarily to the Emergency Department by private vehicle.    Consults  Subjective:     History of Present Illness:  55 yo woman w/ hx of bipolar disorder and polysubstance abuse.  Now here voluntarily seeking bed for psych stabalization and 'to get clean'.  Non-compliant w/ rx which should be effexor xr 150mg daily, abilify 10mg daily and buspar 30mg bid.  Off them x 1 month.  4 days ago took 12 tabs of mirtazapine in an attempt to end her life.  Still wants to die now.  Using meth, ice, weed, crack also.  Etoh.  States drinking 24-48 beers per day with last drink 18 hours ago however no detectable etoh in her system and states no hx of withdrawal.  Marginal historian    Psychiatric History:   Previous Psychiatric Hospitalizations: Yes    Previous Medication Trials: Yes    Previous Suicide Attempts: yes    History of Violence: no  History of Depression: yes  History of Cristy: yes  History of Auditory/Visual Hallucination no  History of Delusions: no  Outpatient psychiatrist (current & past): Yes    Substance Abuse History:  Tobacco:Yes  Alcohol: Yes  Illicit Substances:Yes  Detox/Rehab: Yes    Legal History: Past  "charges/incarcerations: unknown     Family Psychiatric History: unknown      Social History:  Lives with meli in apt in [place].  Disabled.      Psychiatric Mental Status Exam:  Arousal: alert  Sensorium/Orientation: oriented to grossly intact  Behavior/Cooperation: normal, cooperative   Speech: normal tone, normal rate, normal pitch, normal volume  Language: grossly intact  Mood: " depressed "   Affect: appropriate and depressed  Thought Process: normal and logical  Thought Content:   Auditory hallucinations: NO  Visual hallucinations: NO  Paranoia: NO  Delusions:  NO  Suicidal ideation: YES:       Homicidal ideation: NO  Attention/Concentration:  intact  Memory:    Recent:  Intact   Remote: Intact   3/3 immediate,  /3 at 5 min  Fund of Knowledge: Aware of current events   Abstract reasoning: proverbs were abstract  Insight: intact  Judgment: behavior is adequate to circumstances      Past Medical History:   Past Medical History:   Diagnosis Date    Addiction to drug     Alcohol abuse     Anxiety     Asthma     Bipolar 1 disorder     COPD (chronic obstructive pulmonary disease)     Depression     Fatigue     History of psychiatric hospitalization     Hx of psychiatric care     Hypothyroid     Psychiatric problem     Depression, Anxiety, Suicidal Ideations    Psychosis     hx of drug induced psychosis    Sleep difficulties     Suicide attempt     2005, 2013 - OD on "medications"    Therapy     [facility]    Withdrawal symptoms, alcohol     Withdrawal symptoms, drug or narcotic       Laboratory Data:   Labs Reviewed   CBC W/ AUTO DIFFERENTIAL - Abnormal; Notable for the following components:       Result Value    Mean Corpuscular Volume 80 (*)     Mean Corpuscular Hemoglobin 23.7 (*)     Mean Corpuscular Hemoglobin Conc 29.8 (*)     RDW 18.3 (*)     Platelets 450 (*)     All other components within normal limits   COMPREHENSIVE METABOLIC PANEL - Abnormal; Notable " "for the following components:    CO2 21 (*)     Glucose 120 (*)     BUN, Bld 22 (*)     All other components within normal limits   URINALYSIS, REFLEX TO URINE CULTURE - Abnormal; Notable for the following components:    Specific Gravity, UA >=1.030 (*)     All other components within normal limits    Narrative:     Preferred Collection Type->Urine, Clean Catch   DRUG SCREEN PANEL, URINE EMERGENCY    Narrative:     Preferred Collection Type->Urine, Clean Catch   ALCOHOL,MEDICAL (ETHANOL)   TSH       Neurological History:  Seizures: No  Head trauma: No    Allergies:    Review of patient's allergies indicates:   Allergen Reactions    Beef containing products      Indigestion, no anaphylaxis    Citrus and derivatives      Indigestion, no anaphylaxis    Methadone Other (See Comments)     "I'm not myself."    Tomato      Indigestion, no anaphylaxis       Medications in ER: Medications - No data to display    Medications at home: see above    No new subjective & objective note has been filed under this hospital service since the last note was generated.      Assessment - Diagnosis - Goals:     Diagnosis/Impression: mood disorder unspecified either primary or d/t comorbid polysubstance use disorder severe    Rec: no prns needed now .  Needs pec for safety d/t SI and hospitaiization     Time with patient: 15 min      More than 50% of the time was spent counseling/coordinating care    Consulting clinician was informed of the encounter and consult note.    Consultation ended: 10/18/2019 at 545 pm    Neida Garland MD   Psychiatry  [facility]  "

## 2019-10-18 NOTE — ED PROVIDER NOTES
"SCRIBE #1 NOTE: I, Kvng Claudio, am scribing for, and in the presence of, Keli Small MD. I have scribed the entire note.       History     Chief Complaint   Patient presents with    Mental Health Problem     psych eval, drug abuse     Review of patient's allergies indicates:   Allergen Reactions    Beef containing products      Indigestion, no anaphylaxis    Citrus and derivatives      Indigestion, no anaphylaxis    Methadone Other (See Comments)     "I'm not myself."    Tomato      Indigestion, no anaphylaxis         History of Present Illness     HPI    10/18/2019, 4:09 PM  History obtained from the patient      History of Present Illness: Jo-Ann Wray is a 54 y.o. female patient with a PMHx of psychosis and bipolar 1 disorder who presents to the Emergency Department for evaluation of SI which onset gradually several days ago. Symptoms are constant and moderate in severity. No mitigating or exacerbating factors reported. Associated sxs includes increased stress. Patient denies any HI, auditory/visual hallucinations, sleep disturbance, IVDA, EtOH use, and all other sxs at this time. No prior Tx reported. No further complaints or concerns at this time. Pt admits noncompliance with psych meds, stating that she is "tired of taking medicine." Pt reports attempted suicide 4 days ago via sleep medication overdose.       Arrival mode: Ambulatory service    PCP: Primary Doctor No        Past Medical History:  Past Medical History:   Diagnosis Date    Addiction to drug     Alcohol abuse     Anxiety     Asthma     Bipolar 1 disorder     COPD (chronic obstructive pulmonary disease)     Depression     Fatigue     History of psychiatric hospitalization     Hx of psychiatric care     Hypothyroid     Psychiatric problem     Depression, Anxiety, Suicidal Ideations    Psychosis     hx of drug induced psychosis    Sleep difficulties     Suicide attempt     2005, 2013 - OD on "medications"    " "Therapy     [facility]    Withdrawal symptoms, alcohol     Withdrawal symptoms, drug or narcotic        Past Surgical History:  Past Surgical History:   Procedure Laterality Date    CARPAL TUNNEL RELEASE       SECTION      GASTRIC BYPASS           Family History:  Family History   Problem Relation Age of Onset    Dementia Mother     Heart attack Mother     Stroke Mother        Social History:  Social History     Tobacco Use    Smoking status: Current Every Day Smoker     Packs/day: 0.50     Types: Cigarettes    Smokeless tobacco: Never Used   Substance and Sexual Activity    Alcohol use: Yes     Alcohol/week: 4.0 - 5.0 standard drinks     Types: 4 - 5 Cans of beer per week     Comment: "a couple times a month"    Drug use: Yes     Types: Cocaine     Comment: used this morning     Sexual activity: Not Currently     Partners: Male        Review of Systems     Review of Systems   Constitutional: Negative for fever.   HENT: Negative for sore throat.    Respiratory: Negative for shortness of breath.    Cardiovascular: Negative for chest pain.   Gastrointestinal: Negative for nausea.   Genitourinary: Negative for dysuria.   Musculoskeletal: Negative for back pain.   Skin: Negative for rash.   Neurological: Negative for weakness.   Hematological: Does not bruise/bleed easily.   Psychiatric/Behavioral: Positive for suicidal ideas. Negative for hallucinations (auditory/visual) and sleep disturbance.        + increased stress  - HI  - IVDA  - EtOH use   All other systems reviewed and are negative.       Physical Exam     Initial Vitals [10/18/19 1606]   BP Pulse Resp Temp SpO2   110/66 92 18 98.8 °F (37.1 °C) 96 %      MAP       --          Physical Exam  Nursing Notes and Vital Signs Reviewed.  Constitutional: Patient is in no acute distress. Well-developed and well-nourished.  Head: Atraumatic. Normocephalic.  Eyes: PERRL. EOM intact. Conjunctivae are not pale. No scleral " icterus.  ENT: Mucous membranes are moist. Oropharynx is clear and symmetric.    Neck: Supple. Full ROM. No lymphadenopathy.  Cardiovascular: Regular rate. Regular rhythm. No murmurs, rubs, or gallops. Distal pulses are 2+ and symmetric.  Pulmonary/Chest: No respiratory distress. Clear to auscultation bilaterally. No wheezing or rales.  Abdominal: Soft and non-distended.  There is no tenderness.  No rebound, guarding, or rigidity. Good bowel sounds.  Genitourinary: No CVA tenderness  Musculoskeletal: Moves all extremities. No obvious deformities. No calf tenderness.  Skin: Warm and dry.  Neurological:  Alert, awake, and appropriate.  Normal speech.  No acute focal neurological deficits are appreciated.  Psychiatric: Flat affect. Poor eye contact. Suicidal. No HI or auditory/visual hallucinations.       ED Course   Procedures  ED Vital Signs:  Vitals:    10/18/19 1606   BP: 110/66   Pulse: 92   Resp: 18   Temp: 98.8 °F (37.1 °C)   TempSrc: Oral   SpO2: 96%       Abnormal Lab Results:  Labs Reviewed   CBC W/ AUTO DIFFERENTIAL - Abnormal; Notable for the following components:       Result Value    Mean Corpuscular Volume 80 (*)     Mean Corpuscular Hemoglobin 23.7 (*)     Mean Corpuscular Hemoglobin Conc 29.8 (*)     RDW 18.3 (*)     Platelets 450 (*)     All other components within normal limits   COMPREHENSIVE METABOLIC PANEL - Abnormal; Notable for the following components:    CO2 21 (*)     Glucose 120 (*)     BUN, Bld 22 (*)     All other components within normal limits   URINALYSIS, REFLEX TO URINE CULTURE - Abnormal; Notable for the following components:    Specific Gravity, UA >=1.030 (*)     All other components within normal limits    Narrative:     Preferred Collection Type->Urine, Clean Catch   TSH   DRUG SCREEN PANEL, URINE EMERGENCY    Narrative:     Preferred Collection Type->Urine, Clean Catch   ALCOHOL,MEDICAL (ETHANOL)   PREGNANCY TEST, URINE RAPID        All Lab Results:  Results for orders placed or  performed during the hospital encounter of 10/18/19   CBC auto differential   Result Value Ref Range    WBC 7.55 3.90 - 12.70 K/uL    RBC 5.11 4.00 - 5.40 M/uL    Hemoglobin 12.1 12.0 - 16.0 g/dL    Hematocrit 40.6 37.0 - 48.5 %    Mean Corpuscular Volume 80 (L) 82 - 98 fL    Mean Corpuscular Hemoglobin 23.7 (L) 27.0 - 31.0 pg    Mean Corpuscular Hemoglobin Conc 29.8 (L) 32.0 - 36.0 g/dL    RDW 18.3 (H) 11.5 - 14.5 %    Platelets 450 (H) 150 - 350 K/uL    MPV 9.8 9.2 - 12.9 fL    Immature Granulocytes 0.3 0.0 - 0.5 %    Gran # (ANC) 4.0 1.8 - 7.7 K/uL    Immature Grans (Abs) 0.02 0.00 - 0.04 K/uL    Lymph # 3.0 1.0 - 4.8 K/uL    Mono # 0.4 0.3 - 1.0 K/uL    Eos # 0.1 0.0 - 0.5 K/uL    Baso # 0.03 0.00 - 0.20 K/uL    nRBC 0 0 /100 WBC    Gran% 52.4 38.0 - 73.0 %    Lymph% 40.0 18.0 - 48.0 %    Mono% 5.8 4.0 - 15.0 %    Eosinophil% 1.1 0.0 - 8.0 %    Basophil% 0.4 0.0 - 1.9 %    Differential Method Automated    Comprehensive metabolic panel   Result Value Ref Range    Sodium 139 136 - 145 mmol/L    Potassium 4.1 3.5 - 5.1 mmol/L    Chloride 106 95 - 110 mmol/L    CO2 21 (L) 23 - 29 mmol/L    Glucose 120 (H) 70 - 110 mg/dL    BUN, Bld 22 (H) 6 - 20 mg/dL    Creatinine 0.7 0.5 - 1.4 mg/dL    Calcium 9.3 8.7 - 10.5 mg/dL    Total Protein 7.3 6.0 - 8.4 g/dL    Albumin 3.5 3.5 - 5.2 g/dL    Total Bilirubin 0.2 0.1 - 1.0 mg/dL    Alkaline Phosphatase 69 55 - 135 U/L    AST 21 10 - 40 U/L    ALT 16 10 - 44 U/L    Anion Gap 12 8 - 16 mmol/L    eGFR if African American >60 >60 mL/min/1.73 m^2    eGFR if non African American >60 >60 mL/min/1.73 m^2   TSH   Result Value Ref Range    TSH 1.544 0.400 - 4.000 uIU/mL   Urinalysis, Reflex to Urine Culture Urine, Clean Catch   Result Value Ref Range    Specimen UA Urine, Clean Catch     Color, UA Yellow Yellow, Straw, Kristin    Appearance, UA Clear Clear    pH, UA 6.0 5.0 - 8.0    Specific Gravity, UA >=1.030 (A) 1.005 - 1.030    Protein, UA Negative Negative    Glucose, UA Negative  Negative    Ketones, UA Negative Negative    Bilirubin (UA) Negative Negative    Occult Blood UA Negative Negative    Nitrite, UA Negative Negative    Urobilinogen, UA Negative <2.0 EU/dL    Leukocytes, UA Negative Negative   Drug screen panel, emergency   Result Value Ref Range    Benzodiazepines Negative     Methadone metabolites Negative     Cocaine (Metab.) Presumptive Positive     Opiate Scrn, Ur Negative     Barbiturate Screen, Ur Negative     Amphetamine Screen, Ur Negative     THC Presumptive Positive     Phencyclidine Negative     Creatinine, Random Ur 113.8 15.0 - 325.0 mg/dL    Toxicology Information SEE COMMENT    Ethanol   Result Value Ref Range    Alcohol, Medical, Serum <10 <10 mg/dL         Imaging Results:  Imaging Results    None               The Emergency Provider reviewed the vital signs and test results, which are outlined above.     ED Discussion     4:50 PM: The PEC hold has been issued by Dr. Small at this time for SI.    5:34 PM: Pt has been medically cleared by Dr. Small at this time. Reassessed pt at this time. Pt is resting comfortably and appears in no acute distress. There are no psychiatric services offered at this facility. D/w pt all pertinent ED information and plan to transfer to psychiatric facility for psychiatric treatment. Pt verbalizes understanding. Patient being transferred by [facility] for ongoing personal protection en route. Pt has been made aware of all risks and benefits associated with transfer, including but not limited to death, MVC, loss of vital signs, and/or permanent disability. Benefits include ability to be treated at an inpatient psychiatric facility. Pt will be transported by personnel trained in CPR and CPI. Patient understands that there could be unforeseen motor vehicle accidents, inclement weather, or loss of vital signs that could result in potential death or permanent disability. All questions and complaints have been addressed at this time. Pt  condition is stable at this time and is clear to transfer to psychiatric facility at this time.     5:45 PM: Discussed pt's case with Dr. Garland (Psychiatry) who recommends PEC hold.       MDM        Medical Decision Making:   Clinical Tests:   Lab Tests: Ordered and Reviewed           ED Medication(s):  Medications - No data to display    New Prescriptions    No medications on file               Scribe Attestation:   Scribe #1: I performed the above scribed service and the documentation accurately describes the services I performed. I attest to the accuracy of the note.     Attending:   Physician Attestation Statement for Scribe #1: I, Keli Small MD, personally performed the services described in this documentation, as scribed by Kvng Snyder, in my presence, and it is both accurate and complete.           Clinical Impression       ICD-10-CM ICD-9-CM   1. Suicidal thoughts R45.851 V62.84   2. Depression with suicidal ideation F32.9 311    R45.851 V62.84   3. Substance abuse F19.10 305.90       Disposition:   Disposition: Transferred  Condition: Stable         Keli Small MD  10/18/19 7046

## 2019-10-18 NOTE — PROVIDER PROGRESS NOTES - EMERGENCY DEPT.
Encounter Date: 10/18/2019    ED Physician Progress Notes       SCRIBE NOTE: I, Kvng Snyder, am scribing for, and in the presence of,  Keli Small MD.  Physician Statement: IKeli MD, personally performed the services described in this documentation as scribed by Kvng Snyder in my presence, and it is both accurate and complete.          6:48 PM: Consult with Dr. Brooks (Psychiatry) at [facility] concerning pt. There are no psychiatric services, which the patient requires, offered at [facility] at this time. Dr. Brooks expresses understanding and will accept transfer for psychiatric evaluation.  Accepting Facility: [facility]  Accepting Physician: Dr. Brooks

## 2019-10-19 NOTE — ED NOTES
Patients belongings:    1 purse   1 wallet   1 pair of glasses  1 can of demetrio  1 drivers license   Insurance cards   Day planner  1 jacket   1 shirt   1 pair of tennis shoes   1 pair of PJ pants     1 laundry bag  Laundry bag and emptied and searched for harmful objects. Only various clothing items in bag.

## 2019-10-19 NOTE — ED NOTES
Patients medications:     1 bottle of buspirone 30mg + 1 empty bottle   1 bottle of gabapentin 300mg  2 bottles of venlafaxine 150mg  1 bottle of aripiprazole 10mg  1 bottle of pantoprazole 40mg  1 bottle of hydroxyzine 25mg  1 empty bottle of mirtazapine 15mg  1 bottle of L-thyroxine 0.075mcg  1 bottle of naltrexone 50mg  I atrovent inhaler

## 2020-01-03 ENCOUNTER — HISTORICAL (OUTPATIENT)
Dept: ADMINISTRATIVE | Facility: HOSPITAL | Age: 55
End: 2020-01-03

## 2020-01-09 LAB
FINAL CULTURE: NORMAL
FINAL CULTURE: NORMAL

## 2020-08-21 ENCOUNTER — HISTORICAL (OUTPATIENT)
Dept: RADIOLOGY | Facility: HOSPITAL | Age: 55
End: 2020-08-21

## 2022-08-09 ENCOUNTER — APPOINTMENT (OUTPATIENT)
Dept: LAB | Facility: HOSPITAL | Age: 57
End: 2022-08-09
Attending: NURSE PRACTITIONER
Payer: MEDICARE

## 2022-08-09 DIAGNOSIS — E11.9 DIABETES: Primary | ICD-10-CM

## 2022-08-09 LAB
ALBUMIN SERPL-MCNC: 3.6 GM/DL (ref 3.5–5)
ALBUMIN/GLOB SERPL: 1.3 RATIO (ref 1.1–2)
ALP SERPL-CCNC: 66 UNIT/L (ref 40–150)
ALT SERPL-CCNC: 15 UNIT/L (ref 0–55)
AST SERPL-CCNC: 17 UNIT/L (ref 5–34)
BILIRUBIN DIRECT+TOT PNL SERPL-MCNC: 0.3 MG/DL
BUN SERPL-MCNC: 13.1 MG/DL (ref 9.8–20.1)
CALCIUM SERPL-MCNC: 9.5 MG/DL (ref 8.4–10.2)
CHLORIDE SERPL-SCNC: 109 MMOL/L (ref 98–107)
CO2 SERPL-SCNC: 28 MMOL/L (ref 22–29)
CREAT SERPL-MCNC: 0.67 MG/DL (ref 0.55–1.02)
CREAT UR-MCNC: 54.6 MG/DL (ref 47–110)
EST. AVERAGE GLUCOSE BLD GHB EST-MCNC: 116.9 MG/DL
GFR SERPLBLD CREATININE-BSD FMLA CKD-EPI: >60 MLS/MIN/1.73/M2
GLOBULIN SER-MCNC: 2.8 GM/DL (ref 2.4–3.5)
GLUCOSE SERPL-MCNC: 118 MG/DL (ref 74–100)
HBA1C MFR BLD: 5.7 %
MICROALBUMIN UR-MCNC: <5 UG/ML
MICROALBUMIN/CREAT RATIO PNL UR: <9.2 MG/GM CR (ref 0–30)
POTASSIUM SERPL-SCNC: 4.5 MMOL/L (ref 3.5–5.1)
PROT SERPL-MCNC: 6.4 GM/DL (ref 6.4–8.3)
SODIUM SERPL-SCNC: 143 MMOL/L (ref 136–145)

## 2022-08-09 PROCEDURE — 83036 HEMOGLOBIN GLYCOSYLATED A1C: CPT

## 2022-08-09 PROCEDURE — 82043 UR ALBUMIN QUANTITATIVE: CPT

## 2022-08-09 PROCEDURE — 80053 COMPREHEN METABOLIC PANEL: CPT

## 2022-08-09 PROCEDURE — 36415 COLL VENOUS BLD VENIPUNCTURE: CPT

## 2022-10-14 ENCOUNTER — HOSPITAL ENCOUNTER (OUTPATIENT)
Dept: RADIOLOGY | Facility: HOSPITAL | Age: 57
Discharge: HOME | End: 2022-10-14
Attending: NURSE PRACTITIONER
Payer: MEDICARE

## 2022-10-14 DIAGNOSIS — I51.9 MYXEDEMA HEART DISEASE: ICD-10-CM

## 2022-10-14 DIAGNOSIS — E11.9 DIABETES MELLITUS WITHOUT COMPLICATION: ICD-10-CM

## 2022-10-14 DIAGNOSIS — F51.02 TRANSIENT DISORDER OF INITIATING OR MAINTAINING SLEEP: ICD-10-CM

## 2022-10-14 DIAGNOSIS — F43.25 ADJUSTMENT DISORDER WITH MIXED DISTURBANCE OF EMOTIONS AND CONDUCT: ICD-10-CM

## 2022-10-14 DIAGNOSIS — E03.9 MYXEDEMA HEART DISEASE: ICD-10-CM

## 2022-10-14 DIAGNOSIS — M25.50 PAIN IN JOINT, MULTIPLE SITES: ICD-10-CM

## 2022-10-14 DIAGNOSIS — J44.89 OBSTRUCTIVE CHRONIC BRONCHITIS WITHOUT EXACERBATION: ICD-10-CM

## 2022-10-14 PROCEDURE — 73502 X-RAY EXAM HIP UNI 2-3 VIEWS: CPT | Mod: TC,RT

## 2022-10-14 PROCEDURE — 73502 X-RAY EXAM HIP UNI 2-3 VIEWS: CPT | Mod: TC,LT

## 2022-11-27 ENCOUNTER — HOSPITAL ENCOUNTER (EMERGENCY)
Facility: HOSPITAL | Age: 57
Discharge: PSYCHIATRIC OR SUBSTANCE USE FACILITY | End: 2022-11-27
Attending: INTERNAL MEDICINE
Payer: MEDICARE

## 2022-11-27 ENCOUNTER — HOSPITAL ENCOUNTER (INPATIENT)
Facility: HOSPITAL | Age: 57
LOS: 1 days | Discharge: PSYCHIATRIC OR SUBSTANCE USE FACILITY | DRG: 885 | End: 2022-11-28
Attending: PSYCHIATRY & NEUROLOGY | Admitting: PSYCHIATRY & NEUROLOGY
Payer: MEDICARE

## 2022-11-27 VITALS
HEIGHT: 62 IN | TEMPERATURE: 99 F | BODY MASS INDEX: 27.23 KG/M2 | RESPIRATION RATE: 18 BRPM | SYSTOLIC BLOOD PRESSURE: 109 MMHG | HEART RATE: 84 BPM | OXYGEN SATURATION: 96 % | DIASTOLIC BLOOD PRESSURE: 68 MMHG | WEIGHT: 148 LBS

## 2022-11-27 DIAGNOSIS — Z00.8 MEDICAL CLEARANCE FOR PSYCHIATRIC ADMISSION: ICD-10-CM

## 2022-11-27 DIAGNOSIS — R45.851 DEPRESSION WITH SUICIDAL IDEATION: Primary | ICD-10-CM

## 2022-11-27 DIAGNOSIS — F32.A DEPRESSION WITH SUICIDAL IDEATION: Primary | ICD-10-CM

## 2022-11-27 DIAGNOSIS — F31.9 BIPOLAR DISORDER: ICD-10-CM

## 2022-11-27 LAB
ALBUMIN SERPL-MCNC: 4 GM/DL (ref 3.5–5)
ALBUMIN/GLOB SERPL: 1.1 RATIO (ref 1.1–2)
ALP SERPL-CCNC: 73 UNIT/L (ref 40–150)
ALT SERPL-CCNC: 23 UNIT/L (ref 0–55)
AMPHET UR QL SCN: NEGATIVE
APAP SERPL-MCNC: <17.4 UG/ML (ref 17.4–30)
APPEARANCE UR: CLEAR
AST SERPL-CCNC: 24 UNIT/L (ref 5–34)
BACTERIA #/AREA URNS AUTO: ABNORMAL /HPF
BARBITURATE SCN PRESENT UR: NEGATIVE
BASOPHILS # BLD AUTO: 0.04 X10(3)/MCL (ref 0–0.2)
BASOPHILS NFR BLD AUTO: 0.7 %
BENZODIAZ UR QL SCN: NEGATIVE
BILIRUB UR QL STRIP.AUTO: NEGATIVE MG/DL
BILIRUBIN DIRECT+TOT PNL SERPL-MCNC: 0.2 MG/DL
BUN SERPL-MCNC: 8.3 MG/DL (ref 9.8–20.1)
CALCIUM SERPL-MCNC: 9.8 MG/DL (ref 8.4–10.2)
CANNABINOIDS UR QL SCN: NEGATIVE
CHLORIDE SERPL-SCNC: 108 MMOL/L (ref 98–107)
CO2 SERPL-SCNC: 22 MMOL/L (ref 22–29)
COCAINE UR QL SCN: NEGATIVE
COLOR UR AUTO: COLORLESS
CREAT SERPL-MCNC: 0.69 MG/DL (ref 0.55–1.02)
EOSINOPHIL # BLD AUTO: 0.06 X10(3)/MCL (ref 0–0.9)
EOSINOPHIL NFR BLD AUTO: 1 %
ERYTHROCYTE [DISTWIDTH] IN BLOOD BY AUTOMATED COUNT: 14.7 % (ref 11.5–17)
ETHANOL SERPL-MCNC: 101 MG/DL
FENTANYL UR QL SCN: NEGATIVE
GFR SERPLBLD CREATININE-BSD FMLA CKD-EPI: >60 MLS/MIN/1.73/M2
GLOBULIN SER-MCNC: 3.5 GM/DL (ref 2.4–3.5)
GLUCOSE SERPL-MCNC: 103 MG/DL (ref 74–100)
GLUCOSE SERPL-MCNC: 88 MG/DL (ref 70–110)
GLUCOSE UR QL STRIP.AUTO: NORMAL MG/DL
HCT VFR BLD AUTO: 41.2 % (ref 37–47)
HGB BLD-MCNC: 13.2 GM/DL (ref 12–16)
IMM GRANULOCYTES # BLD AUTO: 0 X10(3)/MCL (ref 0–0.04)
IMM GRANULOCYTES NFR BLD AUTO: 0 %
KETONES UR QL STRIP.AUTO: NEGATIVE MG/DL
LEUKOCYTE ESTERASE UR QL STRIP.AUTO: NEGATIVE UNIT/L
LYMPHOCYTES # BLD AUTO: 2.56 X10(3)/MCL (ref 0.6–4.6)
LYMPHOCYTES NFR BLD AUTO: 42.2 %
MAGNESIUM SERPL-MCNC: 2.1 MG/DL (ref 1.6–2.6)
MCH RBC QN AUTO: 27.4 PG (ref 27–31)
MCHC RBC AUTO-ENTMCNC: 32 MG/DL (ref 33–36)
MCV RBC AUTO: 85.7 FL (ref 80–94)
MDMA UR QL SCN: NEGATIVE
MONOCYTES # BLD AUTO: 0.31 X10(3)/MCL (ref 0.1–1.3)
MONOCYTES NFR BLD AUTO: 5.1 %
NEUTROPHILS # BLD AUTO: 3.1 X10(3)/MCL (ref 2.1–9.2)
NEUTROPHILS NFR BLD AUTO: 51 %
NITRITE UR QL STRIP.AUTO: NEGATIVE
NRBC BLD AUTO-RTO: 0 %
OPIATES UR QL SCN: NEGATIVE
PCP UR QL: NEGATIVE
PH UR STRIP.AUTO: 5 [PH]
PH UR: 5 [PH] (ref 3–11)
PLATELET # BLD AUTO: 437 X10(3)/MCL (ref 130–400)
PMV BLD AUTO: 10.5 FL (ref 7.4–10.4)
POCT GLUCOSE: 105 MG/DL (ref 70–110)
POCT GLUCOSE: 88 MG/DL (ref 70–110)
POTASSIUM SERPL-SCNC: 4.1 MMOL/L (ref 3.5–5.1)
PROT SERPL-MCNC: 7.5 GM/DL (ref 6.4–8.3)
PROT UR QL STRIP.AUTO: NEGATIVE MG/DL
RBC # BLD AUTO: 4.81 X10(6)/MCL (ref 4.2–5.4)
RBC #/AREA URNS AUTO: ABNORMAL /HPF
RBC UR QL AUTO: NEGATIVE UNIT/L
SALICYLATES SERPL-MCNC: <5 MG/DL
SARS-COV-2 RDRP RESP QL NAA+PROBE: NEGATIVE
SODIUM SERPL-SCNC: 141 MMOL/L (ref 136–145)
SP GR UR STRIP.AUTO: 1
SPECIFIC GRAVITY, URINE AUTO (.000) (OHS): 1 (ref 1–1.03)
SQUAMOUS #/AREA URNS LPF: ABNORMAL /HPF
TSH SERPL-ACNC: 0.38 UIU/ML (ref 0.35–4.94)
UROBILINOGEN UR STRIP-ACNC: NORMAL MG/DL
WBC # SPEC AUTO: 6.1 X10(3)/MCL (ref 4.5–11.5)
WBC #/AREA URNS AUTO: ABNORMAL /HPF

## 2022-11-27 PROCEDURE — S4991 NICOTINE PATCH NONLEGEND: HCPCS | Performed by: NURSE PRACTITIONER

## 2022-11-27 PROCEDURE — 80143 DRUG ASSAY ACETAMINOPHEN: CPT | Performed by: INTERNAL MEDICINE

## 2022-11-27 PROCEDURE — 84443 ASSAY THYROID STIM HORMONE: CPT | Performed by: INTERNAL MEDICINE

## 2022-11-27 PROCEDURE — 82077 ASSAY SPEC XCP UR&BREATH IA: CPT | Performed by: INTERNAL MEDICINE

## 2022-11-27 PROCEDURE — 25000003 PHARM REV CODE 250: Performed by: NURSE PRACTITIONER

## 2022-11-27 PROCEDURE — 87635 SARS-COV-2 COVID-19 AMP PRB: CPT | Performed by: INTERNAL MEDICINE

## 2022-11-27 PROCEDURE — 80307 DRUG TEST PRSMV CHEM ANLYZR: CPT | Performed by: INTERNAL MEDICINE

## 2022-11-27 PROCEDURE — 99285 EMERGENCY DEPT VISIT HI MDM: CPT | Mod: 25

## 2022-11-27 PROCEDURE — 80053 COMPREHEN METABOLIC PANEL: CPT | Performed by: INTERNAL MEDICINE

## 2022-11-27 PROCEDURE — 83735 ASSAY OF MAGNESIUM: CPT | Performed by: INTERNAL MEDICINE

## 2022-11-27 PROCEDURE — 85025 COMPLETE CBC W/AUTO DIFF WBC: CPT | Performed by: INTERNAL MEDICINE

## 2022-11-27 PROCEDURE — 80179 DRUG ASSAY SALICYLATE: CPT | Performed by: INTERNAL MEDICINE

## 2022-11-27 PROCEDURE — 93005 ELECTROCARDIOGRAM TRACING: CPT

## 2022-11-27 PROCEDURE — 11400000 HC PSYCH PRIVATE ROOM

## 2022-11-27 PROCEDURE — 81001 URINALYSIS AUTO W/SCOPE: CPT | Performed by: INTERNAL MEDICINE

## 2022-11-27 RX ORDER — BUSPIRONE HYDROCHLORIDE 30 MG/1
30 TABLET ORAL 2 TIMES DAILY
COMMUNITY

## 2022-11-27 RX ORDER — BUSPIRONE HYDROCHLORIDE 10 MG/1
30 TABLET ORAL 2 TIMES DAILY
Status: DISCONTINUED | OUTPATIENT
Start: 2022-11-27 | End: 2022-11-28 | Stop reason: HOSPADM

## 2022-11-27 RX ORDER — CYPROHEPTADINE HYDROCHLORIDE 4 MG/1
4 TABLET ORAL NIGHTLY
COMMUNITY

## 2022-11-27 RX ORDER — CYPROHEPTADINE HYDROCHLORIDE 4 MG/1
4 TABLET ORAL NIGHTLY PRN
Status: DISCONTINUED | OUTPATIENT
Start: 2022-11-27 | End: 2022-11-28 | Stop reason: HOSPADM

## 2022-11-27 RX ORDER — TRAZODONE HYDROCHLORIDE 100 MG/1
100 TABLET ORAL
COMMUNITY

## 2022-11-27 RX ORDER — DIVALPROEX SODIUM 250 MG/1
250 TABLET, DELAYED RELEASE ORAL 2 TIMES DAILY
Status: DISCONTINUED | OUTPATIENT
Start: 2022-11-27 | End: 2022-11-28 | Stop reason: HOSPADM

## 2022-11-27 RX ORDER — ALUMINUM HYDROXIDE, MAGNESIUM HYDROXIDE, AND SIMETHICONE 2400; 240; 2400 MG/30ML; MG/30ML; MG/30ML
30 SUSPENSION ORAL EVERY 6 HOURS PRN
Status: DISCONTINUED | OUTPATIENT
Start: 2022-11-27 | End: 2022-11-28 | Stop reason: HOSPADM

## 2022-11-27 RX ORDER — HYDROXYZINE PAMOATE 50 MG/1
50 CAPSULE ORAL 3 TIMES DAILY PRN
Status: DISCONTINUED | OUTPATIENT
Start: 2022-11-27 | End: 2022-11-28 | Stop reason: HOSPADM

## 2022-11-27 RX ORDER — PANTOPRAZOLE SODIUM 40 MG/1
40 TABLET, DELAYED RELEASE ORAL
Status: DISCONTINUED | OUTPATIENT
Start: 2022-11-27 | End: 2022-11-28 | Stop reason: HOSPADM

## 2022-11-27 RX ORDER — ACETAMINOPHEN 325 MG/1
650 TABLET ORAL EVERY 6 HOURS PRN
Status: DISCONTINUED | OUTPATIENT
Start: 2022-11-27 | End: 2022-11-28 | Stop reason: HOSPADM

## 2022-11-27 RX ORDER — BUTALBITAL, ACETAMINOPHEN AND CAFFEINE 50; 325; 40 MG/1; MG/1; MG/1
1 TABLET ORAL EVERY 4 HOURS PRN
Status: ON HOLD | COMMUNITY
End: 2022-11-28

## 2022-11-27 RX ORDER — ADHESIVE BANDAGE
30 BANDAGE TOPICAL DAILY PRN
Status: DISCONTINUED | OUTPATIENT
Start: 2022-11-27 | End: 2022-11-28 | Stop reason: HOSPADM

## 2022-11-27 RX ORDER — ALBUTEROL SULFATE 90 UG/1
AEROSOL, METERED RESPIRATORY (INHALATION) EVERY 6 HOURS PRN
COMMUNITY

## 2022-11-27 RX ORDER — IBUPROFEN 200 MG
1 TABLET ORAL DAILY
Status: DISCONTINUED | OUTPATIENT
Start: 2022-11-27 | End: 2022-11-27 | Stop reason: HOSPADM

## 2022-11-27 RX ORDER — INSULIN ASPART 100 [IU]/ML
1-10 INJECTION, SOLUTION INTRAVENOUS; SUBCUTANEOUS
Status: DISCONTINUED | OUTPATIENT
Start: 2022-11-27 | End: 2022-11-28 | Stop reason: HOSPADM

## 2022-11-27 RX ORDER — MELOXICAM 7.5 MG/1
15 TABLET ORAL DAILY PRN
Status: DISCONTINUED | OUTPATIENT
Start: 2022-11-27 | End: 2022-11-28 | Stop reason: HOSPADM

## 2022-11-27 RX ORDER — SEMAGLUTIDE 1.34 MG/ML
0.5 INJECTION, SOLUTION SUBCUTANEOUS
Status: ON HOLD | COMMUNITY
Start: 2022-07-04 | End: 2022-11-28

## 2022-11-27 RX ORDER — PANTOPRAZOLE SODIUM 40 MG/1
40 TABLET, DELAYED RELEASE ORAL
COMMUNITY

## 2022-11-27 RX ORDER — ROPINIROLE 0.5 MG/1
1 TABLET, FILM COATED ORAL NIGHTLY
Status: DISCONTINUED | OUTPATIENT
Start: 2022-11-27 | End: 2022-11-28 | Stop reason: HOSPADM

## 2022-11-27 RX ORDER — ROPINIROLE 1 MG/1
1 TABLET, FILM COATED ORAL NIGHTLY
COMMUNITY

## 2022-11-27 RX ORDER — TRAZODONE HYDROCHLORIDE 50 MG/1
100 TABLET ORAL NIGHTLY
Status: DISCONTINUED | OUTPATIENT
Start: 2022-11-27 | End: 2022-11-28 | Stop reason: HOSPADM

## 2022-11-27 RX ORDER — IBUPROFEN 200 MG
1 TABLET ORAL DAILY
Status: DISCONTINUED | OUTPATIENT
Start: 2022-11-27 | End: 2022-11-28 | Stop reason: HOSPADM

## 2022-11-27 RX ORDER — LEVOTHYROXINE SODIUM 88 UG/1
TABLET ORAL
COMMUNITY

## 2022-11-27 RX ORDER — MELOXICAM 15 MG/1
15 TABLET ORAL DAILY PRN
COMMUNITY

## 2022-11-27 RX ORDER — DULOXETIN HYDROCHLORIDE 60 MG/1
60 CAPSULE, DELAYED RELEASE ORAL DAILY
Status: DISCONTINUED | OUTPATIENT
Start: 2022-11-29 | End: 2022-11-28 | Stop reason: HOSPADM

## 2022-11-27 RX ORDER — LEVOTHYROXINE SODIUM 88 UG/1
88 TABLET ORAL
Status: DISCONTINUED | OUTPATIENT
Start: 2022-11-28 | End: 2022-11-28 | Stop reason: HOSPADM

## 2022-11-27 RX ORDER — MAG HYDROX/ALUMINUM HYD/SIMETH 200-200-20
30 SUSPENSION, ORAL (FINAL DOSE FORM) ORAL
Status: DISCONTINUED | OUTPATIENT
Start: 2022-11-27 | End: 2022-11-27

## 2022-11-27 RX ORDER — MUPIROCIN 20 MG/G
OINTMENT TOPICAL 2 TIMES DAILY
Status: DISCONTINUED | OUTPATIENT
Start: 2022-11-27 | End: 2022-11-28

## 2022-11-27 RX ORDER — CYPROHEPTADINE HYDROCHLORIDE 4 MG/1
4 TABLET ORAL NIGHTLY
Status: DISCONTINUED | OUTPATIENT
Start: 2022-11-27 | End: 2022-11-27

## 2022-11-27 RX ORDER — CITALOPRAM 20 MG/1
20 TABLET, FILM COATED ORAL DAILY
Status: ON HOLD | COMMUNITY
End: 2022-11-28

## 2022-11-27 RX ORDER — DULOXETIN HYDROCHLORIDE 30 MG/1
30 CAPSULE, DELAYED RELEASE ORAL DAILY
Status: COMPLETED | OUTPATIENT
Start: 2022-11-27 | End: 2022-11-28

## 2022-11-27 RX ADMIN — ROPINIROLE 1 MG: 0.5 TABLET, FILM COATED ORAL at 08:11

## 2022-11-27 RX ADMIN — DIVALPROEX SODIUM 250 MG: 250 TABLET, DELAYED RELEASE ORAL at 01:11

## 2022-11-27 RX ADMIN — MUPIROCIN: 20 OINTMENT TOPICAL at 08:11

## 2022-11-27 RX ADMIN — PANTOPRAZOLE SODIUM 40 MG: 40 TABLET, DELAYED RELEASE ORAL at 04:11

## 2022-11-27 RX ADMIN — BUSPIRONE HYDROCHLORIDE 30 MG: 10 TABLET ORAL at 08:11

## 2022-11-27 RX ADMIN — MELOXICAM 15 MG: 7.5 TABLET ORAL at 08:11

## 2022-11-27 RX ADMIN — ACETAMINOPHEN 650 MG: 325 TABLET, FILM COATED ORAL at 04:11

## 2022-11-27 RX ADMIN — DIVALPROEX SODIUM 250 MG: 250 TABLET, DELAYED RELEASE ORAL at 08:11

## 2022-11-27 RX ADMIN — NICOTINE 1 PATCH: 14 PATCH, EXTENDED RELEASE TRANSDERMAL at 12:11

## 2022-11-27 RX ADMIN — TRAZODONE HYDROCHLORIDE 100 MG: 50 TABLET ORAL at 08:11

## 2022-11-27 RX ADMIN — DULOXETINE HYDROCHLORIDE 30 MG: 30 CAPSULE, DELAYED RELEASE ORAL at 01:11
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
## 2022-11-27 NOTE — ED PROVIDER NOTES
Encounter Date: 2022       History     Chief Complaint   Patient presents with    Suicidal     Pt arrives via [facility] with c/o SI with a plan to take her sleeping pills; denies any HI, AH,and VH     Presents by EMS with suicidal ideation and plan to OD on her sleeping pills. States her son  some years ago and he should be 30 years old this days. Thinking about him make her depressed and wish to go with him. Hx of alcohol abuse, states was recovered but started drinking yesterday. Hx of bipolar disorder but not taking meds for the last 2 years.    The history is provided by the patient and the EMS personnel.   Review of patient's allergies indicates:  No Known Allergies  No past medical history on file.  No past surgical history on file.  No family history on file.     Review of Systems   Constitutional:  Negative for fever.   HENT:  Negative for sore throat.    Respiratory:  Negative for shortness of breath.    Cardiovascular:  Negative for chest pain.   Gastrointestinal:  Negative for nausea.   Genitourinary:  Negative for dysuria.   Musculoskeletal:  Negative for back pain.   Skin:  Negative for rash.   Neurological:  Negative for weakness.   Hematological:  Does not bruise/bleed easily.   Psychiatric/Behavioral:  Positive for suicidal ideas.    All other systems reviewed and are negative.    Physical Exam     Initial Vitals [22 0544]   BP Pulse Resp Temp SpO2   109/68 84 18 98.6 °F (37 °C) 96 %      MAP       --         Physical Exam    Nursing note and vitals reviewed.  Constitutional: She appears well-developed. No distress.   HENT:   Head: Normocephalic and atraumatic.   Mouth/Throat: Oropharynx is clear and moist.   Eyes: Conjunctivae are normal. Pupils are equal, round, and reactive to light.   Neck: Neck supple.   Normal range of motion.  Cardiovascular:  Normal rate, regular rhythm, normal heart sounds and intact distal pulses.           Pulmonary/Chest: Breath sounds normal.   Abdominal:  Abdomen is soft. Bowel sounds are normal. She exhibits no distension. There is no abdominal tenderness. There is no rebound and no guarding.   Musculoskeletal:         General: No edema. Normal range of motion.      Cervical back: Normal range of motion and neck supple.     Neurological: She is alert and oriented to person, place, and time. She has normal strength. GCS score is 15. GCS eye subscore is 4. GCS verbal subscore is 5. GCS motor subscore is 6.   Skin: Skin is warm and dry. No rash noted.   Psychiatric: Her speech is normal and behavior is normal. She is not actively hallucinating. Cognition and memory are normal. She expresses impulsivity. She exhibits a depressed mood. She expresses suicidal ideation. She expresses suicidal plans. She is attentive.       ED Course   Procedures  Labs Reviewed   COMPREHENSIVE METABOLIC PANEL - Abnormal; Notable for the following components:       Result Value    Chloride 108 (*)     Glucose Level 103 (*)     Blood Urea Nitrogen 8.3 (*)     All other components within normal limits   ALCOHOL,MEDICAL (ETHANOL) - Abnormal; Notable for the following components:    Ethanol Level 101.0 (*)     All other components within normal limits   ACETAMINOPHEN LEVEL - Abnormal; Notable for the following components:    Acetaminophen Level <17.4 (*)     All other components within normal limits   CBC WITH DIFFERENTIAL - Abnormal; Notable for the following components:    MCHC 32.0 (*)     Platelet 437 (*)     MPV 10.5 (*)     All other components within normal limits   TSH - Normal   SARS-COV-2 RNA AMPLIFICATION, QUAL - Normal    Narrative:     The IDNOW COVID-19 assay is a rapid molecular in vitro diagnostic test utilizing an isothermal nucleic acid amplification technology intended for the qualitative detection of nucleic acid from the SARS-CoV-2 viral RNA in direct nasal, nasopharyngeal or throat swabs from individuals who are suspected of COVID-19 by their healthcare provider.    MAGNESIUM - Normal   CBC W/ AUTO DIFFERENTIAL    Narrative:     The following orders were created for panel order CBC auto differential.  Procedure                               Abnormality         Status                     ---------                               -----------         ------                     CBC with Differential[[id number]]        Abnormal            Final result                 Please view results for these tests on the individual orders.   SALICYLATE LEVEL   URINALYSIS, REFLEX TO URINE CULTURE   DRUG SCREEN, URINE (BEAKER)          Imaging Results    None          Medications   nicotine 14 mg/24 hr 1 patch (has no administration in time range)                              Clinical Impression:   Final diagnoses:  [Z00.8] Medical clearance for psychiatric admission  [F32.A, R45.851] Depression with suicidal ideation (Primary)      ED Disposition Condition    Transfer to Psych Facility Stable          ED Prescriptions    None       Follow-up Information    None          Mustapha Mcgill MD  11/27/22 0798
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
## 2022-11-27 NOTE — GROUP NOTE
Education Group      Group Focus: Offered group on discharge planning.      Number of patients in attendance: 7    Group Start Time: 1500  Group End Time:  1545  Groups Date: 11/27/2022  Group Topic:  Behavioral Health  Group Department: [facility]  Group Facilitators:  Susana Mayers LPN  _____________________________________________________________________    Patient Name: Jo-Ann Wray  MRN: [id number]  Patient Class: IP- Psych   Admission Date\Time: 11/27/2022 10:40 AM  Hospital Length of Stay: 0  Primary Care Provider: Primary Doctor No     Referred by: Behavioral Medicine Unit Treatment Team     Target symptoms: Depression     Patient's response to treatment: Active Listening, Self-disclosure, and Frequent Questions     Progress toward goals: Progressing adequately     Interval History: attentive     Diagnosis: MDD     Plan: Continue treatment on BMU

## 2022-11-28 VITALS
SYSTOLIC BLOOD PRESSURE: 105 MMHG | TEMPERATURE: 98 F | BODY MASS INDEX: 25.55 KG/M2 | DIASTOLIC BLOOD PRESSURE: 66 MMHG | WEIGHT: 144.19 LBS | OXYGEN SATURATION: 97 % | RESPIRATION RATE: 20 BRPM | HEIGHT: 63 IN | HEART RATE: 111 BPM

## 2022-11-28 LAB
CHOLEST SERPL-MCNC: 161 MG/DL
CHOLEST/HDLC SERPL: 3 {RATIO} (ref 0–5)
EST. AVERAGE GLUCOSE BLD GHB EST-MCNC: 122.6 MG/DL
GLUCOSE P FAST SERPL-MCNC: 90 MG/DL (ref 74–100)
HBA1C MFR BLD: 5.9 %
HDLC SERPL-MCNC: 50 MG/DL (ref 35–60)
LDLC SERPL CALC-MCNC: 99 MG/DL (ref 50–140)
POCT GLUCOSE: 95 MG/DL (ref 70–110)
T PALLIDUM AB SER QL: NONREACTIVE
TRIGL SERPL-MCNC: 60 MG/DL (ref 37–140)
VLDLC SERPL CALC-MCNC: 12 MG/DL

## 2022-11-28 PROCEDURE — 83036 HEMOGLOBIN GLYCOSYLATED A1C: CPT | Performed by: NURSE PRACTITIONER

## 2022-11-28 PROCEDURE — S4991 NICOTINE PATCH NONLEGEND: HCPCS | Performed by: NURSE PRACTITIONER

## 2022-11-28 PROCEDURE — 25000003 PHARM REV CODE 250: Performed by: NURSE PRACTITIONER

## 2022-11-28 PROCEDURE — 36415 COLL VENOUS BLD VENIPUNCTURE: CPT | Performed by: NURSE PRACTITIONER

## 2022-11-28 PROCEDURE — 82947 ASSAY GLUCOSE BLOOD QUANT: CPT | Performed by: NURSE PRACTITIONER

## 2022-11-28 PROCEDURE — 80061 LIPID PANEL: CPT | Performed by: NURSE PRACTITIONER

## 2022-11-28 PROCEDURE — 86780 TREPONEMA PALLIDUM: CPT | Performed by: NURSE PRACTITIONER

## 2022-11-28 PROCEDURE — 25000242 PHARM REV CODE 250 ALT 637 W/ HCPCS: Performed by: PSYCHIATRY & NEUROLOGY

## 2022-11-28 PROCEDURE — 99900031 HC PATIENT EDUCATION (STAT)

## 2022-11-28 PROCEDURE — 94640 AIRWAY INHALATION TREATMENT: CPT

## 2022-11-28 RX ORDER — ALBUTEROL SULFATE 90 UG/1
2 AEROSOL, METERED RESPIRATORY (INHALATION) EVERY 6 HOURS PRN
Status: DISCONTINUED | OUTPATIENT
Start: 2022-11-28 | End: 2022-11-28 | Stop reason: HOSPADM

## 2022-11-28 RX ORDER — DIVALPROEX SODIUM 250 MG/1
250 TABLET, DELAYED RELEASE ORAL 2 TIMES DAILY
Qty: 60 TABLET | Refills: 0
Start: 2022-11-28 | End: 2022-12-28

## 2022-11-28 RX ORDER — DULOXETIN HYDROCHLORIDE 60 MG/1
60 CAPSULE, DELAYED RELEASE ORAL DAILY
Qty: 30 CAPSULE | Refills: 0
Start: 2022-11-29 | End: 2022-12-29

## 2022-11-28 RX ADMIN — BUSPIRONE HYDROCHLORIDE 30 MG: 10 TABLET ORAL at 08:11

## 2022-11-28 RX ADMIN — ACETAMINOPHEN 650 MG: 325 TABLET, FILM COATED ORAL at 01:11

## 2022-11-28 RX ADMIN — PANTOPRAZOLE SODIUM 40 MG: 40 TABLET, DELAYED RELEASE ORAL at 04:11

## 2022-11-28 RX ADMIN — ALBUTEROL SULFATE 2 PUFF: 108 AEROSOL, METERED RESPIRATORY (INHALATION) at 11:11

## 2022-11-28 RX ADMIN — DULOXETINE HYDROCHLORIDE 30 MG: 30 CAPSULE, DELAYED RELEASE ORAL at 08:11

## 2022-11-28 RX ADMIN — DIVALPROEX SODIUM 250 MG: 250 TABLET, DELAYED RELEASE ORAL at 08:11

## 2022-11-28 RX ADMIN — LEVOTHYROXINE SODIUM 88 MCG: 88 TABLET ORAL at 05:11

## 2022-11-28 RX ADMIN — NICOTINE 1 PATCH: 14 PATCH, EXTENDED RELEASE TRANSDERMAL at 08:11

## 2022-11-28 NOTE — HOSPITAL COURSE
She was admitted to the psych unit and monitored for safety. She was provided with therapy. Meds were reconciled and adjusted. She was started on Depakote and Cymbalta. She was transferred to [facility] for ongoing care for insurance reasons.

## 2022-11-28 NOTE — NURSING
This nurse was informed that pt's primary insurance is C, not accepted here. Dr. Cavazos notified with new order to transfer to [facility] in [place]. Intake notifed of new orders.

## 2022-11-28 NOTE — CARE UPDATE
Jo-Ann will be laterally transported to [facility] in [place] due to having [facility] Dual Complete Medicare Insurance. [facility] is out of network to this facility. Jo-Ann will be transported by [facility] to [facility]

## 2022-11-28 NOTE — NURSING
"Awake alert and oriented. Mood and affect improved. Denies SI or hallucinations now. "I feel really stressed". Somatic complaints of right foot and hip pain. Pt admits to "self medicating with alcohol". Seasonal depression around holidays and death of son. Complliant with medications prior to admission. Q 15 min safety checks with suicide and fall precautions.Pt will be transferring to [facility] in [place] secondary to insurance discrepancy.  "

## 2022-11-28 NOTE — NURSING
Awake and alert, calm and cooperative, sitting in dayroom, complaints of right hip and right foot / ankle pain 7/10, received ordered pain medication SEE MAR, presently denies SI, denies AH, medication compliant, will continue to monitor.
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
## 2022-11-28 NOTE — NURSING
Pt discharged to [facility]placed by [facility] in [place] via [facility] per stretcher.   Belongings and discharge instructions given to Pt.

## 2022-11-29 NOTE — GROUP NOTE
Group     Group Focus: Promoting Healthy Lifestyles      Number of patients in attendance: 8    Group Start Time: 1600  Group End Time:  1630  Groups Date: 11/28/2022  Group Topic:  Behavioral Health  Group Department: [facility]  Group Facilitators:  Jose Miguel Mueller LPN  _____________________________________________________________________    Patient Name: Jo-Ann Wray  MRN: [id number]  Patient Class: IP- Psych   Admission Date\Time: 11/27/2022 10:40 AM  Hospital Length of Stay: 1  Primary Care Provider: Primary Doctor No     Referred by: Behavioral Medicine Unit Treatment Team     Target symptoms: Depression, Anxiety, and Mood Disorder     Patient's response to treatment: Active Listening and Self-disclosure     Progress toward goals: Progressing slowly     Interval History: Participated with good interaction     Diagnosis: Bipolar     Plan: Continue treatment on BMU

## 2023-01-30 ENCOUNTER — LAB VISIT (OUTPATIENT)
Dept: LAB | Facility: HOSPITAL | Age: 58
End: 2023-01-30
Attending: NURSE PRACTITIONER
Payer: MEDICARE

## 2023-01-30 DIAGNOSIS — E11.9 DIABETES MELLITUS WITHOUT COMPLICATION: Primary | ICD-10-CM

## 2023-01-30 LAB
ALBUMIN SERPL-MCNC: 3.8 G/DL (ref 3.5–5)
ALBUMIN/GLOB SERPL: 1.4 RATIO (ref 1.1–2)
ALP SERPL-CCNC: 68 UNIT/L (ref 40–150)
ALT SERPL-CCNC: 22 UNIT/L (ref 0–55)
AST SERPL-CCNC: 29 UNIT/L (ref 5–34)
BILIRUBIN DIRECT+TOT PNL SERPL-MCNC: 0.5 MG/DL
BUN SERPL-MCNC: 18.7 MG/DL (ref 9.8–20.1)
CALCIUM SERPL-MCNC: 9 MG/DL (ref 8.4–10.2)
CHLORIDE SERPL-SCNC: 104 MMOL/L (ref 98–107)
CO2 SERPL-SCNC: 24 MMOL/L (ref 22–29)
CREAT SERPL-MCNC: 0.77 MG/DL (ref 0.55–1.02)
CREAT UR-MCNC: 226.7 MG/DL (ref 47–110)
EST. AVERAGE GLUCOSE BLD GHB EST-MCNC: 137 MG/DL
GFR SERPLBLD CREATININE-BSD FMLA CKD-EPI: >60 MLS/MIN/1.73/M2
GLOBULIN SER-MCNC: 2.7 GM/DL (ref 2.4–3.5)
GLUCOSE SERPL-MCNC: 115 MG/DL (ref 74–100)
HBA1C MFR BLD: 6.4 %
MICROALBUMIN UR-MCNC: 25 UG/ML
MICROALBUMIN/CREAT RATIO PNL UR: 11 MG/GM CR (ref 0–30)
POTASSIUM SERPL-SCNC: 3.8 MMOL/L (ref 3.5–5.1)
PROT SERPL-MCNC: 6.5 GM/DL (ref 6.4–8.3)
SODIUM SERPL-SCNC: 138 MMOL/L (ref 136–145)

## 2023-01-30 PROCEDURE — 80053 COMPREHEN METABOLIC PANEL: CPT

## 2023-01-30 PROCEDURE — 36415 COLL VENOUS BLD VENIPUNCTURE: CPT

## 2023-01-30 PROCEDURE — 82043 UR ALBUMIN QUANTITATIVE: CPT

## 2023-01-30 PROCEDURE — 83036 HEMOGLOBIN GLYCOSYLATED A1C: CPT

## 2023-07-05 ENCOUNTER — LAB VISIT (OUTPATIENT)
Dept: LAB | Facility: HOSPITAL | Age: 58
End: 2023-07-05
Attending: NURSE PRACTITIONER
Payer: MEDICARE

## 2023-07-05 DIAGNOSIS — Z13.6 SCREENING FOR ISCHEMIC HEART DISEASE: ICD-10-CM

## 2023-07-05 DIAGNOSIS — Z00.00 ROUTINE GENERAL MEDICAL EXAMINATION AT A HEALTH CARE FACILITY: Primary | ICD-10-CM

## 2023-07-05 DIAGNOSIS — Z36.3 ENCOUNTER FOR ROUTINE SCREENING FOR MALFORMATION USING ULTRASONICS: ICD-10-CM

## 2023-07-05 DIAGNOSIS — Z11.3 SCREENING EXAMINATION FOR VENEREAL DISEASE: ICD-10-CM

## 2023-07-05 DIAGNOSIS — Z13.1 SCREENING FOR DIABETES MELLITUS: ICD-10-CM

## 2023-07-05 DIAGNOSIS — Z13.220 SCREENING FOR LIPOID DISORDERS: ICD-10-CM

## 2023-07-05 LAB
ALBUMIN SERPL-MCNC: 3.8 G/DL (ref 3.5–5)
ALBUMIN/GLOB SERPL: 1.5 RATIO (ref 1.1–2)
ALP SERPL-CCNC: 55 UNIT/L (ref 40–150)
ALT SERPL-CCNC: 17 UNIT/L (ref 0–55)
AST SERPL-CCNC: 19 UNIT/L (ref 5–34)
BASOPHILS # BLD AUTO: 0.04 X10(3)/MCL
BASOPHILS NFR BLD AUTO: 0.7 %
BILIRUBIN DIRECT+TOT PNL SERPL-MCNC: 0.3 MG/DL
BUN SERPL-MCNC: 17.3 MG/DL (ref 9.8–20.1)
CALCIUM SERPL-MCNC: 9.3 MG/DL (ref 8.4–10.2)
CHLORIDE SERPL-SCNC: 103 MMOL/L (ref 98–107)
CHOLEST SERPL-MCNC: 165 MG/DL
CHOLEST/HDLC SERPL: 3 {RATIO} (ref 0–5)
CO2 SERPL-SCNC: 28 MMOL/L (ref 22–29)
CREAT SERPL-MCNC: 0.61 MG/DL (ref 0.55–1.02)
DEPRECATED CALCIDIOL+CALCIFEROL SERPL-MC: 23.5 NG/ML (ref 30–80)
EOSINOPHIL # BLD AUTO: 0.11 X10(3)/MCL (ref 0–0.9)
EOSINOPHIL NFR BLD AUTO: 2 %
ERYTHROCYTE [DISTWIDTH] IN BLOOD BY AUTOMATED COUNT: 16.1 % (ref 11.5–17)
EST. AVERAGE GLUCOSE BLD GHB EST-MCNC: 125.5 MG/DL
GFR SERPLBLD CREATININE-BSD FMLA CKD-EPI: >60 MLS/MIN/1.73/M2
GLOBULIN SER-MCNC: 2.6 GM/DL (ref 2.4–3.5)
GLUCOSE SERPL-MCNC: 94 MG/DL (ref 74–100)
HBA1C MFR BLD: 6 %
HCT VFR BLD AUTO: 33.2 % (ref 37–47)
HCV AB SERPL QL IA: NONREACTIVE
HDLC SERPL-MCNC: 57 MG/DL (ref 35–60)
HGB BLD-MCNC: 10 G/DL (ref 12–16)
IMM GRANULOCYTES # BLD AUTO: 0.01 X10(3)/MCL (ref 0–0.04)
IMM GRANULOCYTES NFR BLD AUTO: 0.2 %
IRON SATN MFR SERPL: 6 % (ref 20–50)
IRON SERPL-MCNC: 26 UG/DL (ref 50–170)
LDLC SERPL CALC-MCNC: 90 MG/DL (ref 50–140)
LYMPHOCYTES # BLD AUTO: 2.65 X10(3)/MCL (ref 0.6–4.6)
LYMPHOCYTES NFR BLD AUTO: 48.3 %
MCH RBC QN AUTO: 25.2 PG (ref 27–31)
MCHC RBC AUTO-ENTMCNC: 30.1 G/DL (ref 33–36)
MCV RBC AUTO: 83.6 FL (ref 80–94)
MONOCYTES # BLD AUTO: 0.47 X10(3)/MCL (ref 0.1–1.3)
MONOCYTES NFR BLD AUTO: 8.6 %
NEUTROPHILS # BLD AUTO: 2.21 X10(3)/MCL (ref 2.1–9.2)
NEUTROPHILS NFR BLD AUTO: 40.2 %
NRBC BLD AUTO-RTO: 0 %
PLATELET # BLD AUTO: 350 X10(3)/MCL (ref 130–400)
PMV BLD AUTO: 10.2 FL (ref 7.4–10.4)
POTASSIUM SERPL-SCNC: 4.4 MMOL/L (ref 3.5–5.1)
PROT SERPL-MCNC: 6.4 GM/DL (ref 6.4–8.3)
RBC # BLD AUTO: 3.97 X10(6)/MCL (ref 4.2–5.4)
SODIUM SERPL-SCNC: 138 MMOL/L (ref 136–145)
T4 FREE SERPL-MCNC: 0.94 NG/DL (ref 0.7–1.48)
TIBC SERPL-MCNC: 406 UG/DL (ref 70–310)
TIBC SERPL-MCNC: 432 UG/DL (ref 250–450)
TRANSFERRIN SERPL-MCNC: 405 MG/DL (ref 180–382)
TRIGL SERPL-MCNC: 88 MG/DL (ref 37–140)
TSH SERPL-ACNC: 1.07 UIU/ML (ref 0.35–4.94)
VLDLC SERPL CALC-MCNC: 18 MG/DL
WBC # SPEC AUTO: 5.49 X10(3)/MCL (ref 4.5–11.5)

## 2023-07-05 PROCEDURE — 84443 ASSAY THYROID STIM HORMONE: CPT | Mod: GA

## 2023-07-05 PROCEDURE — 36415 COLL VENOUS BLD VENIPUNCTURE: CPT | Mod: GA

## 2023-07-05 PROCEDURE — 83550 IRON BINDING TEST: CPT | Mod: GA

## 2023-07-05 PROCEDURE — 84439 ASSAY OF FREE THYROXINE: CPT | Mod: GA

## 2023-07-05 PROCEDURE — 82306 VITAMIN D 25 HYDROXY: CPT | Mod: GA

## 2023-07-05 PROCEDURE — 83036 HEMOGLOBIN GLYCOSYLATED A1C: CPT | Mod: GA

## 2023-07-05 PROCEDURE — 80061 LIPID PANEL: CPT

## 2023-07-05 PROCEDURE — 85025 COMPLETE CBC W/AUTO DIFF WBC: CPT | Mod: GA

## 2023-07-05 PROCEDURE — 80053 COMPREHEN METABOLIC PANEL: CPT

## 2023-07-05 PROCEDURE — 86803 HEPATITIS C AB TEST: CPT

## 2023-07-21 ENCOUNTER — HOSPITAL ENCOUNTER (OUTPATIENT)
Dept: RADIOLOGY | Facility: HOSPITAL | Age: 58
Discharge: HOME | End: 2023-07-21
Attending: NURSE PRACTITIONER
Payer: MEDICARE

## 2023-07-21 DIAGNOSIS — M65.331 TRIGGER MIDDLE FINGER OF RIGHT HAND: ICD-10-CM

## 2023-07-21 DIAGNOSIS — J44.89 OBSTRUCTIVE CHRONIC BRONCHITIS WITHOUT EXACERBATION: ICD-10-CM

## 2023-07-21 DIAGNOSIS — F43.25 ADJUSTMENT DISORDER WITH MIXED DISTURBANCE OF EMOTIONS AND CONDUCT: ICD-10-CM

## 2023-07-21 DIAGNOSIS — M79.641 RIGHT HAND PAIN: ICD-10-CM

## 2023-07-21 DIAGNOSIS — F51.02 TRANSIENT DISORDER OF INITIATING OR MAINTAINING SLEEP: ICD-10-CM

## 2023-07-21 DIAGNOSIS — I51.9 MYXEDEMA HEART DISEASE: ICD-10-CM

## 2023-07-21 DIAGNOSIS — G25.81 RESTLESS LEGS: ICD-10-CM

## 2023-07-21 DIAGNOSIS — E03.9 MYXEDEMA HEART DISEASE: ICD-10-CM

## 2023-07-21 PROCEDURE — 73130 X-RAY EXAM OF HAND: CPT | Mod: TC,RT

## 2023-08-31 NOTE — ED PROVIDER NOTES
September 1, 2023       Mariela Crane MD  [id number]  [address] [place] [address] [place] [address]  Via Fax: [phone]      Patient: Ascencion Bravo   YOB: 2009   Date of Visit: 8/31/2023       Dear Dr. Crane:    I saw your patient, Ascencion Bravo, for an evaluation. Below are my notes for this visit with him.    If you have questions, please do not hesitate to call me.      Sincerely,        Rolando Butterfield MD        CC: No Recipients    Rolando Butterfield MD  9/1/2023  6:34 PM  Signed  Pediatric Cardiology Consultation  Thursday, 8/31/2023  [facility]    Referring Physician:  Mariela Crane MD    Chief Complaint   Patient presents with   • Follow-up     DMD (Duchenne musular dystrophy) (CMS/HCC)      SUBJECTIVE  HPI: Ascencion Bravo is a 14 year old male with history of Duchenne muscular dystrophy, here in [facility] with his father for cardiac follow-up examination.  His previous visit was on 8/18/2022 in [facility].     Ascencion has diagnosis of Duchenne muscular dystrophy since he was 7-8 months old. He has been followed up by Dr. Palomares, pediatric neurologist at [facility] in [place].  He is taking deflazacort 6 mg in the morning and 12 mg in the evening.  He is in seventh grade this year.  At present time he still can walk with ankle brace.  He also use insole in his shoes. He also has significant scoliosis. There is no apparent history of chest pain, palpitations, exercise intolerance, shortness of breath, cyanosis, or syncope. He sleeps well at night. His father has no other concern.    He had percutaneous tendon achilles lengthening and casting in May 2022.     REVIEW OF SYSTEM  Review of Systems   Constitutional: Negative for activity change, appetite change, chills, diaphoresis, fatigue, fever, irritability and unexpected weight change.   HENT: Negative for congestion, dental problem, drooling, ear discharge, ear pain, facial  "SCRIBE #1 NOTE: I, Fidencio Elkins, am scribing for, and in the presence of, Keli Small MD. I have scribed the entire note.      History      Chief Complaint   Patient presents with    Dizziness     pt has smoked crack for the past 4 days and drank now she is dizzy       Review of patient's allergies indicates:   Allergen Reactions    Methadone Other (See Comments)        HPI   HPI    2017, 8:08 AM   History obtained from the patient      History of Present Illness: Jo-Ann rWay is a 52 y.o. female patient who presents to the Emergency Department for SI which onset gradually this AM. Symptoms are consatant and moderate in severity. Sx are exacerbated by nothing and relieved by nothing. Associated sxs include ETOH abuse and drug abuse. Pt states she drinks everyday and reports her last drink being around 0330 this AM. Pt also states she has been smoking crack for the past 4 days. Pt reports having SI in the past, 3 years ago, and states she was placed at [facility] ago. Pt states she would probable commit suicide if she left the hospital and states "she would probably get hit by a car." Pt state she called EMS because she needs help. Pt also reports she has not been taking her medications. No other sxs reported. Patient denies any fever, N/V/D, chills, HI, auditory/visual hallucinations, sleep changes, IV drug use, abd pain, CP, SOB, dizziness and all other sxs at this time. No further complaints or concerns at this time.     Arrival mode: Personal vehicle     PCP: Primary Doctor No       Past Medical History:  Past Medical History:   Diagnosis Date    Anxiety     Bipolar 1 disorder     Hypothyroid        Past Surgical History:  Past Surgical History:   Procedure Laterality Date     SECTION      GASTRIC BYPASS           Family History:  History reviewed. No pertinent family history.    Social History:  Social History     Social History Main Topics "    Smoking status: Current Every Day Smoker     Packs/day: 0.50     Types: Cigarettes    Smokeless tobacco: Never Used    Alcohol use No    Drug use: Yes    Sexual activity: Not on file       ROS   Review of Systems   Constitutional: Negative for chills and fever.   HENT: Negative for congestion and sore throat.    Respiratory: Negative for chest tightness and shortness of breath.    Cardiovascular: Negative for chest pain.   Gastrointestinal: Negative for abdominal pain, nausea and vomiting.   Musculoskeletal: Negative for back pain and neck pain.   Skin: Negative for rash.   Neurological: Negative for dizziness, numbness and headaches.   Psychiatric/Behavioral: Positive for suicidal ideas. Negative for agitation, behavioral problems, confusion, decreased concentration, dysphoric mood, hallucinations, self-injury and sleep disturbance. The patient is not nervous/anxious and is not hyperactive.         (-)HI (+)ETOH abuse (+)drug abuse   All other systems reviewed and are negative.      Physical Exam    Initial Vitals   BP Pulse Resp Temp SpO2   05/16/17 0754 05/16/17 0754 05/16/17 0754 05/16/17 0754 05/16/17 0754   101/64 64 16 98 °F (36.7 °C) 95 %      Physical Exam  Nursing Notes and Vital Signs Reviewed.  Constitutional: Patient is in no apparent distress. Well-developed and well-nourished.  Head: Atraumatic. Normocephalic.  Eyes: PERRL. EOM intact. Conjunctivae are not pale. No scleral icterus.  ENT: Mucous membranes are moist. Oropharynx is clear and symmetric.    Neck: Supple. Full ROM. No lymphadenopathy.  Cardiovascular: Regular rate. Regular rhythm. No murmurs, rubs, or gallops. Distal pulses are 2+ and symmetric.  Pulmonary/Chest: No respiratory distress. Clear to auscultation bilaterally. No wheezing, rales, or rhonchi.  Abdominal: Soft and non-distended.  There is no tenderness.  No rebound, guarding, or rigidity. Good bowel sounds.  Musculoskeletal: Moves all extremities. No obvious deformities.  swelling, hearing loss, mouth sores, nosebleeds, postnasal drip, rhinorrhea, sinus pressure, sinus pain, sneezing, sore throat, tinnitus, trouble swallowing and voice change.    Eyes: Negative for photophobia, pain, discharge, redness, itching and visual disturbance.   Respiratory: Negative for apnea, cough, choking, chest tightness, shortness of breath, wheezing and stridor.    Cardiovascular: Negative for chest pain, palpitations and leg swelling.   Gastrointestinal: Negative for abdominal distention, abdominal pain, blood in stool, constipation, diarrhea, nausea and vomiting.   Endocrine: Negative for cold intolerance, heat intolerance, polydipsia, polyphagia and polyuria.   Genitourinary: Negative for difficulty urinating, dysuria, enuresis, flank pain, frequency, hematuria and urgency.   Musculoskeletal: Negative for arthralgias, back pain, gait problem, joint swelling, neck pain and neck stiffness.   Skin: Negative for color change, pallor and rash.   Allergic/Immunologic: Negative for environmental allergies, food allergies and immunocompromised state.   Neurological: Negative for tremors, seizures, syncope, facial asymmetry, weakness and headaches.   Hematological: Negative for adenopathy. Does not bruise/bleed easily.   Psychiatric/Behavioral: Negative for confusion, hallucinations, self-injury and sleep disturbance. The patient is not hyperactive.      Past Medical History:   Diagnosis Date   • Functional heart murmur 4/5/2018     History reviewed. No pertinent surgical history.    Family History   Problem Relation Age of Onset   • Patient is unaware of any medical problems Mother    • Patient is unaware of any medical problems Father    • Muscular dystrophy Maternal Uncle    • Arrhythmia Neg Hx    • Sudden Death Neg Hx    • Cardiomyopathy Neg Hx    • Pacemaker Neg Hx    • Myocardial Infarction Neg Hx       There is no known family history of congenital heart disease, early MI, arrhythmia, LQTS.    Social  "No edema. No calf tenderness.  Skin: Warm and dry.  Neurological:  Alert, awake, and appropriate.  Normal speech.  No acute focal neurological deficits are appreciated.  Psychiatric:               Behavior: cooperative, eye contact normal              Mood and Affect: flat affect              Thought Process: blocked              Suicidal Ideations: Yes              Suicidal Plan: No specific plan to harm self              Homicidal Ideations: No              Hallucinations: none      ED Course    Procedures  ED Vital Signs:  Vitals:    05/16/17 0754 05/16/17 0813 05/16/17 0814 05/16/17 0816   BP: 101/64 (!) 120/58 120/61 118/86   Pulse: 64 82 87 95   Resp: 16      Temp: 98 °F (36.7 °C)      TempSrc: Oral      SpO2: 95%      Weight: 68 kg (150 lb)      Height: 5' 3" (1.6 m)       05/16/17 1018 05/16/17 1020 05/16/17 1022 05/16/17 1400   BP: 117/68 131/75 119/74 113/64   Pulse: 90 102 103 87   Resp:    18   Temp:    98.8 °F (37.1 °C)   TempSrc:       SpO2: 96% 95% 95% 95%   Weight:       Height:           Abnormal Lab Results:  Labs Reviewed   CBC W/ AUTO DIFFERENTIAL - Abnormal; Notable for the following:        Result Value    RBC 3.75 (*)     Hemoglobin 11.7 (*)     Hematocrit 34.9 (*)     MCH 31.2 (*)     RDW 15.8 (*)     All other components within normal limits   COMPREHENSIVE METABOLIC PANEL - Abnormal; Notable for the following:     CO2 22 (*)     Calcium 8.0 (*)     Albumin 3.3 (*)     All other components within normal limits   VALPROIC ACID - Abnormal; Notable for the following:     Valproic Acid Lvl <12.5 (*)     All other components within normal limits   TSH   URINALYSIS   DRUG SCREEN PANEL, URINE EMERGENCY   ALCOHOL,MEDICAL (ETHANOL)   PREGNANCY TEST, URINE RAPID        All Lab Results:  Results for orders placed or performed during the hospital encounter of 05/16/17   CBC auto differential   Result Value Ref Range    WBC 5.40 3.90 - 12.70 K/uL    RBC 3.75 (L) 4.00 - 5.40 M/uL    Hemoglobin 11.7 (L) " History     Socioeconomic History   • Marital status: Single     Spouse name: Not on file   • Number of children: Not on file   • Years of education: Not on file   • Highest education level: Not on file   Occupational History   • Not on file   Tobacco Use   • Smoking status: Never     Passive exposure: Never   • Smokeless tobacco: Never   Substance and Sexual Activity   • Alcohol use: Not on file   • Drug use: Never   • Sexual activity: Never   Other Topics Concern   • Not on file   Social History Narrative    Patients Live with     Father, Step Mom    stepsister(s)    2 cat    6th Grade    No smoke exposure     Social Determinants of Health     Financial Resource Strain: Not on file   Food Insecurity: Not on file   Transportation Needs: Not on file   Physical Activity: Not on file   Stress: Not on file   Social Connections: Not on file   Intimate Partner Violence: Not on file     Current Outpatient Medications   Medication Sig Dispense Refill   • lisinopril (ZESTRIL) 5 MG tablet Take 1 tablet by mouth daily. TAKE 1 TABLET DAILY BEFORE BEDTIME. 90 tablet 3   • Deflazacort (EMFLAZA PO) twice daily       No current facility-administered medications for this visit.     ALLERGIES:  No Known Allergies    Diagnostic history:  Echocardiogram on 8/18/2022 in [facility] showed  1. No left to right shunt lesions  2. Normal all 4 cardiac valves  3. Normal all chamber dimensions  4. No ventricular hypertrophy  5. No pulmonary hypertension  6. No coarctation of aorta  7. Normal cardiac function, LVEF 61%  As compared to previous echo study on 8/19/2021, no significant change is found.    Echocardiogram on 8/20/2020 in [facility] showed  1. No left to right shunt lesions  2. Normal all 4 cardiac valves  3. Normal all chamber dimensions  4. No ventricular hypertrophy  5. No pulmonary hypertension  6. Normal cardiac function, LVEF 60%  7. Normal echocardiogram     Echocardiogram on 4/4/2019 in [facility]  12.0 - 16.0 g/dL    Hematocrit 34.9 (L) 37.0 - 48.5 %    MCV 93 82 - 98 fL    MCH 31.2 (H) 27.0 - 31.0 pg    MCHC 33.5 32.0 - 36.0 %    RDW 15.8 (H) 11.5 - 14.5 %    Platelets 261 150 - 350 K/uL    MPV 9.3 9.2 - 12.9 fL    Gran # 3.4 1.8 - 7.7 K/uL    Lymph # 1.6 1.0 - 4.8 K/uL    Mono # 0.4 0.3 - 1.0 K/uL    Eos # 0.0 0.0 - 0.5 K/uL    Baso # 0.02 0.00 - 0.20 K/uL    Gran% 62.9 38.0 - 73.0 %    Lymph% 29.1 18.0 - 48.0 %    Mono% 7.2 4.0 - 15.0 %    Eosinophil% 0.4 0.0 - 8.0 %    Basophil% 0.4 0.0 - 1.9 %    Differential Method Automated    Comprehensive metabolic panel   Result Value Ref Range    Sodium 138 136 - 145 mmol/L    Potassium 3.8 3.5 - 5.1 mmol/L    Chloride 106 95 - 110 mmol/L    CO2 22 (L) 23 - 29 mmol/L    Glucose 97 70 - 110 mg/dL    BUN, Bld 8 6 - 20 mg/dL    Creatinine 0.7 0.5 - 1.4 mg/dL    Calcium 8.0 (L) 8.7 - 10.5 mg/dL    Total Protein 6.3 6.0 - 8.4 g/dL    Albumin 3.3 (L) 3.5 - 5.2 g/dL    Total Bilirubin 0.7 0.1 - 1.0 mg/dL    Alkaline Phosphatase 60 55 - 135 U/L    AST 40 10 - 40 U/L    ALT 25 10 - 44 U/L    Anion Gap 10 8 - 16 mmol/L    eGFR if African American >60 >60 mL/min/1.73 m^2    eGFR if non African American >60 >60 mL/min/1.73 m^2   TSH   Result Value Ref Range    TSH 3.117 0.400 - 4.000 uIU/mL   Urinalysis - clean catch   Result Value Ref Range    Specimen UA Urine, Clean Catch     Color, UA Yellow Yellow, Straw, Kristin    Appearance, UA Clear Clear    pH, UA 5.0 5.0 - 8.0    Specific Gravity, UA 1.025 1.005 - 1.030    Protein, UA Negative Negative    Glucose, UA Negative Negative    Ketones, UA Negative Negative    Bilirubin (UA) Negative Negative    Occult Blood UA Negative Negative    Nitrite, UA Negative Negative    Urobilinogen, UA Negative <2.0 EU/dL    Leukocytes, UA Negative Negative   Drug screen panel, emergency   Result Value Ref Range    Benzodiazepines Negative     Methadone metabolites Negative     Cocaine (Metab.) Presumptive Positive     Opiate Scrn, Ur Negative   showed  1. No left to right shunt lesions  2. Normal all 4 cardiac valves  3. Normal all chamber dimensions  4. No ventricular hypertrophy  5. No pulmonary hypertension  6. Normal cardiac function, LVEF 65%    Echocardiogram on 2018 in [facility] showed  1. No left to right shunt lesions  2. Normal all 4 cardiac valves  3. Normal all chamber dimensions  4. No ventricular hypertrophy  5. No pulmonary hypertension  6. Normal cardiac function, LVEF 67%    OBJECTIVE  Visit Vitals  BP 95/63 (BP Location: RUE - Right upper extremity, Patient Position: Sitting, Cuff Size: Small Adult)   Pulse 95   Ht 4' 0.82\" (1.24 m)   Wt (!) 24.8 kg (54 lb 10.8 oz)   SpO2 95%   BMI 16.13 kg/m²     Body surface area is 0.93 meters squared.    BMI is 15 %ile (Z= -1.05) based on CDC (Boys, 2-20 Years) BMI-for-age based on BMI available as of 2023.    Blood pressure %piero are 48 % systolic and 62 % diastolic based on the 2017 AAP Clinical Practice Guideline. Blood pressure %ile targets: 90%: 109/73, 95%: 113/76, 95% + 12 mmH/88. This reading is in the normal blood pressure range.    The height is at <1 %ile (Z= -3.98) based on CDC (Boys, 2-20 Years) Stature-for-age data based on Stature recorded on 2023.    The weight is at <1 %ile (Z= -3.57) based on CDC (Boys, 2-20 Years) weight-for-age data using vitals from 2023.    Physical Exam    Constitutional: in no acute distress and interactive.     Head and Face: normocephalic and atraumatic. no dysmorphic features were observed.      Eyes: no discharge and normal conjunctiva. the sclerae were normal.      ENT: normal appearing outer ear and normal appearing nose. no nasal discharge. normal lips. oral mucosa pink and moist.      Neck: normal appearing neck and supple neck.      Chest: no thoracic asymmetry, no bulging precordium and no chest deformity.     Surgical Scar(s): None.      Pulmonary: no respiratory distress. no subcostal retractions. breath sounds     Barbiturate Screen, Ur Negative     Amphetamine Screen, Ur Negative     THC Negative     Phencyclidine Negative     Creatinine, Random Ur 131.2 15.0 - 325.0 mg/dL    Toxicology Information SEE COMMENT    Ethanol   Result Value Ref Range    Alcohol, Medical, Serum <10 <10 mg/dL   Pregnancy, urine rapid (UPT)   Result Value Ref Range    Preg Test, Ur Negative    Valproic Acid   Result Value Ref Range    Valproic Acid Lvl <12.5 (L) 50.0 - 100.0 ug/mL         The EKG was ordered, reviewed, and independently interpreted by the ED provider.  Interpretation time: 807  Rate: 83 BPM  Rhythm: normal sinus rhythm  Interpretation: Possible left atrial enlargement. No STEMI.           The Emergency Provider reviewed the vital signs and test results, which are outlined above.    ED Discussion     8:14 AM: The PEC hold has been issued by Dr. Keli Small at this time for psychiatric evaluation.    12:25 PM: Pt has been medically cleared by Dr. Small at this time. Reassessed pt at this time. Pt is resting comfortably and appears in no acute distress. There are no psychiatric services offered at this facility. D/w pt all pertinent ED information and plan to transfer to psychiatric facility for psychiatric treatment. Pt verbalizes understanding. Patient being transferred by [facility] for ongoing personal protection en route. Pt will be transported by personnel trained in CPR and CPI. All questions and complaints have been addressed at this time. Pt condition is stable at this time and is clear to transfer to psychiatric facility at this time.   Accepting Facility: [facility]  Accepting Physician: Dr. Calderon      ED Medication(s):  Medications   sodium chloride 0.9% bolus 1,000 mL (0 mLs Intravenous Stopped 5/16/17 1300)       Discharge Medication List as of 5/16/2017  2:04 PM                Medical Decision Making    Medical Decision Making:   Clinical Tests:   Lab Tests: Reviewed and Ordered  Medical Tests: Ordered and Reviewed    clear to auscultation bilaterally.      Cardiovascular: The heart rate was normal. The rhythm was regular. Heart sounds: normal S1, normal S2, no gallop, no click, no pericardial rub.   Murmurs: There is no murmur.   Radial Pulse: right 2+ and left 2+.   Posterior tibial pulse: right 2+ and left 2+. no pulse delay and capillary refill < 2 secs.   Lower Extremities: no edema present.      Abdomen: soft, nontender and nondistended. no hepatomegaly and no splenomegaly.      Lymphatic: no cervical lymphadenopathy.     Musculoskeletal: Proximal muscle weakness. no clubbing of the fingernails. Scoliosis.      Neurologic: no coordination deficits observed and developmentally appropriate for age. the mood/affect was appropriate for age.      Skin: normal skin color and pigmentation, no bruises, no rash, no central cyanosis and no peripheral cyanosis. normal skin turgor.      Relevant Testing:  ECG: Electrocardiogram today on 8/31/2023 in [facility] shows normal sinus rhythm, heart rate 92 per, no delta wave, no chamber enlargement, normal ST-T waves, normal corrected QT interval 403 ms, normal electrocardiogram.  There is no significant change when compared to previous ECG on 8/18/2022.    Echocardiogram: Echocardiogram today on 8/31/2023 in [facility] shows  1. No left to right shunt lesions  2. Normal all 4 cardiac valves  3. Normal all chamber dimensions  4. No ventricular hypertrophy  5. No pulmonary hypertension  6. No coarctation of aorta  7. Normal cardiac function, LVEF 70%  As compared to the prior echocardiogram performed on 8/18/2022, no significant change is found.     Patient Active Problem List    Diagnosis Date Noted   • DMD (Duchenne muscular dystrophy) (CMD) 04/06/2016     Priority: Low     Orders Placed This Encounter   • Electrocardiogram 12-Lead   • TRANSTHORACIC ECHO (TTE) COMPLETE (PEDS)     ASSESSMENT:  Ascencion is doing well, asymptomatic from cardiovascular standpoint.  He has mild          Scribe Attestation:   Scribe #1: I performed the above scribed service and the documentation accurately describes the services I performed. I attest to the accuracy of the note.    Attending:   Physician Attestation Statement for Scribe #1: I, Keli Small MD, personally performed the services described in this documentation, as scribed by Fidencio Elkins, in my presence, and it is both accurate and complete.          Clinical Impression       ICD-10-CM ICD-9-CM   1. Suicidal behavior without attempted self-injury R46.89 300.9   2. Dizziness R42 780.4   3. Substance abuse F19.10 305.90       Disposition:   Disposition: Transferred  Condition: Stable         Keli Small MD  05/17/17 1613       Keli Small MD  05/29/17 1134     sinus tachycardia which is a common finding in Duchenne muscular dystrophy.  His cardiac function is normal.  The plan is to continue lisinopril 5 mg once daily.      No other intervention is required.  I would like to see him again in 1 year.    Final Diagnosis:   DMD (Duchenne muscular dystrophy) (CMD)  (primary encounter diagnosis)    RECOMMENDATIONS:  Feeding and Nutrition: Leading a healthy lifestyle, eating plenty of fruits and vegetables, eating whole grain foods, minimizing junk food and exercising regularly is important for cardiac health.  Medications: Continue lisinopril 5 mg once daily.  No new medications.  Medications to avoid:  None.   Diagnostic tests: None.  SBE prophylaxis:  Not required.  Immunizations:  Routine immunizations should be provided, including influenza for patients over 6 months of age.   Exercise restrictions:   As tolerated.   Surgical/Anesthesia Concerns:  Patient remained a moderate risk surgical candidate and caution should be made with anesthesia.  A pediatric cardiac anesthesiologist should be consulted prior to any planned procedures.  Patient education:  To contact us for any new, concerning, or recurrent symptoms.  Follow up:  A return visit to cardiology clinic is in 1 year, unless new or concerning symptoms develop.  Routine follow up with the primary doctor is recommended.    His father expressed understanding and agreement with the above recommendations.  All questions were answered.    Rolando Butterfield MD  Pediatric Cardiology

## 2023-09-12 DIAGNOSIS — Z87.891 HISTORY OF TOBACCO USE: Primary | ICD-10-CM

## 2023-09-12 DIAGNOSIS — M65.331 ACQUIRED TRIGGER FINGER OF RIGHT MIDDLE FINGER: Primary | ICD-10-CM

## 2023-10-18 ENCOUNTER — OFFICE VISIT (OUTPATIENT)
Dept: ORTHOPEDICS | Facility: CLINIC | Age: 58
End: 2023-10-18
Payer: MEDICARE

## 2023-10-18 VITALS
HEART RATE: 93 BPM | BODY MASS INDEX: 26.75 KG/M2 | WEIGHT: 151 LBS | SYSTOLIC BLOOD PRESSURE: 116 MMHG | HEIGHT: 63 IN | DIASTOLIC BLOOD PRESSURE: 75 MMHG

## 2023-10-18 DIAGNOSIS — M65.331 ACQUIRED TRIGGER FINGER OF RIGHT MIDDLE FINGER: Primary | ICD-10-CM

## 2023-10-18 PROCEDURE — 99203 OFFICE O/P NEW LOW 30 MIN: CPT | Mod: 25,,, | Performed by: STUDENT IN AN ORGANIZED HEALTH CARE EDUCATION/TRAINING PROGRAM

## 2023-10-18 PROCEDURE — 3008F BODY MASS INDEX DOCD: CPT | Mod: CPTII,,, | Performed by: STUDENT IN AN ORGANIZED HEALTH CARE EDUCATION/TRAINING PROGRAM

## 2023-10-18 PROCEDURE — 99203 PR OFFICE/OUTPT VISIT, NEW, LEVL III, 30-44 MIN: ICD-10-PCS | Mod: 25,,, | Performed by: STUDENT IN AN ORGANIZED HEALTH CARE EDUCATION/TRAINING PROGRAM

## 2023-10-18 PROCEDURE — 1159F MED LIST DOCD IN RCRD: CPT | Mod: CPTII,,, | Performed by: STUDENT IN AN ORGANIZED HEALTH CARE EDUCATION/TRAINING PROGRAM

## 2023-10-18 PROCEDURE — 3078F DIAST BP <80 MM HG: CPT | Mod: CPTII,,, | Performed by: STUDENT IN AN ORGANIZED HEALTH CARE EDUCATION/TRAINING PROGRAM

## 2023-10-18 PROCEDURE — 3074F PR MOST RECENT SYSTOLIC BLOOD PRESSURE < 130 MM HG: ICD-10-PCS | Mod: CPTII,,, | Performed by: STUDENT IN AN ORGANIZED HEALTH CARE EDUCATION/TRAINING PROGRAM

## 2023-10-18 PROCEDURE — 3044F HG A1C LEVEL LT 7.0%: CPT | Mod: CPTII,,, | Performed by: STUDENT IN AN ORGANIZED HEALTH CARE EDUCATION/TRAINING PROGRAM

## 2023-10-18 PROCEDURE — 3074F SYST BP LT 130 MM HG: CPT | Mod: CPTII,,, | Performed by: STUDENT IN AN ORGANIZED HEALTH CARE EDUCATION/TRAINING PROGRAM

## 2023-10-18 PROCEDURE — 3078F PR MOST RECENT DIASTOLIC BLOOD PRESSURE < 80 MM HG: ICD-10-PCS | Mod: CPTII,,, | Performed by: STUDENT IN AN ORGANIZED HEALTH CARE EDUCATION/TRAINING PROGRAM

## 2023-10-18 PROCEDURE — 20550 TENDON SHEATH: ICD-10-PCS | Mod: RT,,, | Performed by: STUDENT IN AN ORGANIZED HEALTH CARE EDUCATION/TRAINING PROGRAM

## 2023-10-18 PROCEDURE — 3044F PR MOST RECENT HEMOGLOBIN A1C LEVEL <7.0%: ICD-10-PCS | Mod: CPTII,,, | Performed by: STUDENT IN AN ORGANIZED HEALTH CARE EDUCATION/TRAINING PROGRAM

## 2023-10-18 PROCEDURE — 20550 NJX 1 TENDON SHEATH/LIGAMENT: CPT | Mod: RT,,, | Performed by: STUDENT IN AN ORGANIZED HEALTH CARE EDUCATION/TRAINING PROGRAM

## 2023-10-18 PROCEDURE — 3008F PR BODY MASS INDEX (BMI) DOCUMENTED: ICD-10-PCS | Mod: CPTII,,, | Performed by: STUDENT IN AN ORGANIZED HEALTH CARE EDUCATION/TRAINING PROGRAM

## 2023-10-18 PROCEDURE — 1159F PR MEDICATION LIST DOCUMENTED IN MEDICAL RECORD: ICD-10-PCS | Mod: CPTII,,, | Performed by: STUDENT IN AN ORGANIZED HEALTH CARE EDUCATION/TRAINING PROGRAM

## 2023-10-18 RX ADMIN — BETAMETHASONE SODIUM PHOSPHATE AND BETAMETHASONE ACETATE 6 MG: 3; 3 INJECTION, SUSPENSION INTRA-ARTICULAR; INTRALESIONAL; INTRAMUSCULAR; SOFT TISSUE at 02:10

## 2023-10-18 RX ADMIN — LIDOCAINE HYDROCHLORIDE 1 ML: 10 INJECTION INFILTRATION; PERINEURAL at 02:10

## 2023-11-08 RX ORDER — BETAMETHASONE SODIUM PHOSPHATE AND BETAMETHASONE ACETATE 3; 3 MG/ML; MG/ML
6 INJECTION, SUSPENSION INTRA-ARTICULAR; INTRALESIONAL; INTRAMUSCULAR; SOFT TISSUE
Status: DISCONTINUED | OUTPATIENT
Start: 2023-10-18 | End: 2023-11-08 | Stop reason: HOSPADM

## 2023-11-08 RX ORDER — LIDOCAINE HYDROCHLORIDE 10 MG/ML
1 INJECTION INFILTRATION; PERINEURAL
Status: DISCONTINUED | OUTPATIENT
Start: 2023-10-18 | End: 2023-11-08 | Stop reason: HOSPADM

## 2023-11-08 NOTE — PROGRESS NOTES
"Chief Complaint:  Right middle finger pain    Consulting Physician: David Soto NP    History of present illness:    Patient is a 58-year-old right-hand dominant female who presents for initial evaluation of her right middle finger pain.  This has been going on for the last 3 months.  She complains of pain localized to the base of the middle finger.  It is worse in the morning and she gets stiffness in the morning.  Upon making a fist and extend her digits there is a catch.  She has not had an injection into the right middle finger yet.  She has not had any surgery into the middle finger yet.  No numbness or tingling into the fingertips    Past Medical History:   Diagnosis Date    Addiction to drug     Alcohol abuse     Anxiety     Asthma     Bipolar 1 disorder     COPD (chronic obstructive pulmonary disease)     Depression     Fatigue     History of psychiatric hospitalization     Hx of psychiatric care     Hypothyroid     Psychiatric problem     Depression, Anxiety, Suicidal Ideations    Psychosis     hx of drug induced psychosis    Sleep difficulties     Suicide attempt     ,  - OD on "medications"    Therapy     [facility]    Withdrawal symptoms, alcohol     Withdrawal symptoms, drug or narcotic        Past Surgical History:   Procedure Laterality Date    CARPAL TUNNEL RELEASE       SECTION      GASTRIC BYPASS         Current Outpatient Medications   Medication Sig    albuterol (PROVENTIL/VENTOLIN HFA) 90 mcg/actuation inhaler Inhale into the lungs every 6 (six) hours as needed for Wheezing. Rescue    busPIRone (BUSPAR) 30 MG Tab Take 30 mg by mouth 2 (two) times daily.    levothyroxine (SYNTHROID) 88 MCG tablet Take by mouth before breakfast. Last filled     meloxicam (MOBIC) 15 MG tablet Take 15 mg by mouth daily as needed for Pain (pain).    pantoprazole (PROTONIX) 40 MG tablet Take 40 mg by mouth before dinner. Last filled     ramelteon (ROZEREM) " "8 mg tablet Take 1 tablet (8 mg total) by mouth every evening.    rOPINIRole (REQUIP) 1 MG tablet Take 1 mg by mouth every evening.    cyanocobalamin (VITAMIN B-12) 100 MCG tablet Take 1 tablet (100 mcg total) by mouth once daily. (Patient not taking: Reported on 10/18/2023)    cyproheptadine (PERIACTIN) 4 mg tablet Take 4 mg by mouth nightly.    divalproex (DEPAKOTE) 250 MG EC tablet Take 1 tablet (250 mg total) by mouth 2 (two) times a day.    DULoxetine (CYMBALTA) 60 MG capsule Take 1 capsule (60 mg total) by mouth once daily.    ferrous sulfate 325 (65 FE) MG EC tablet Take 1 tablet (325 mg total) by mouth every 48 hours. (Patient not taking: Reported on 10/18/2023)    folic acid (FOLVITE) 1 MG tablet Take 1 tablet (1 mg total) by mouth once daily.    gabapentin (NEURONTIN) 300 MG capsule Take 1 capsule (300 mg total) by mouth 3 (three) times daily.    senna (SENOKOT) 8.6 mg tablet Take 1 tablet by mouth daily as needed for Constipation. (Patient not taking: Reported on 10/18/2023)    thiamine 100 MG tablet Take 1 tablet (100 mg total) by mouth once daily. (Patient not taking: Reported on 10/18/2023)    traZODone (DESYREL) 100 MG tablet Take 100 mg by mouth.     No current facility-administered medications for this visit.       Review of patient's allergies indicates:   Allergen Reactions    Beef containing products      Indigestion, no anaphylaxis    Citrus and derivatives      Indigestion, no anaphylaxis    Methadone Other (See Comments)     "I'm not myself."    Tomato      Indigestion, no anaphylaxis       Family History   Problem Relation Age of Onset    Dementia Mother     Heart attack Mother     Stroke Mother        Social History     Socioeconomic History    Marital status:     Number of children: 3   Occupational History    Occupation: disabled   Tobacco Use    Smoking status: Every Day     Current packs/day: 1.00     Average packs/day: 1 pack/day for 20.0 years (20.0 ttl pk-yrs)     Types: " "Cigarettes    Smokeless tobacco: Never   Substance and Sexual Activity    Alcohol use: Yes     Alcohol/week: 4.0 - 5.0 standard drinks of alcohol     Types: 4 - 5 Cans of beer per week     Comment: daily whatever infront of her(patient own words)    Drug use: Yes     Types: Cocaine, Marijuana     Comment: meth ice weed crack (in patient words)    Sexual activity: Not Currently     Partners: Male   Other Topics Concern    Patient feels they ought to cut down on drinking/drug use Yes    Patient annoyed by others criticizing their drinking/drug use Yes    Patient has felt bad or guilty about drinking/drug use Yes    Patient has had a drink/used drugs as an eye opener in the AM Yes   Social History Narrative    Patient is legally , lives with current fiance of 5 years in [place], has 3 adult children, 2 of which were "taken by the state" - years ago. Patient is disabled. Reports using public and Medicaid transportation.        Review of Systems:    Constitution:   Denies chills, fever, and sweats.  HENT:   Denies headaches or blurry vision.  Cardiovascular:  Denies chest pain or irregular heart beat.  Respiratory:   Denies cough or shortness of breath.  Gastrointestinal:  Denies abdominal pain, nausea, or vomiting.  Musculoskeletal:   Denies muscle cramps.  Neurological:   Denies dizziness or focal weakness.  Psychiatric/Behavior: Normal mental status.  Hematology/Lymph:  Denies bleeding problem or easy bruising/bleeding.  Skin:    Denies rash or suspicious lesions.    Examination:    Vital Signs:    Vitals:    10/18/23 1431   BP: 116/75   Pulse: 93   Weight: 68.5 kg (151 lb)   Height: 5' 3" (1.6 m)   PainSc:   8       Body mass index is 26.75 kg/m².    Constitution:   Well-developed, well nourished patient in no acute distress.  Neurological:   Alert and oriented x 3 and cooperative to examination.     Psychiatric/Behavior: Normal mental status.  Respiratory:   No shortness of breath.  Eyes: "    Extraoccular muscles intact  Skin:    No scars, rash or suspicious lesions.    MSK:   Right hand:  No open wounds or rashes.  Tenderness to palpation of the A1 pulley of the right middle finger.  She can make a fist and flex her finger into the distal palmar crease and extend her digits fully.  There is a palpable catch of the middle finger.  Sensation light touch intact in median ulnar radial distribution.  Radial pulse 2 +hand is warm well perfused    Imaging:   X-rays three views of the right hand show no fractures or dislocations     Assessment:  Right middle finger trigger finger    Plan:  I will start by treating this conservatively.  I will give her an injection into the right middle finger flexor tendon sheath today.  She can work on range of motion as tolerated.  She can see me back as needed if this returns    Follow Up:  As needed  Xray at next visit:  None

## 2023-11-08 NOTE — PROCEDURES
Tendon Sheath    Date/Time: 10/18/2023 2:00 PM    Performed by: Rubén Heredia MD  Authorized by: Rubén Heredia MD    Consent Done?:  Yes (Verbal)  Indications:  Pain  Timeout: prior to procedure the correct patient, procedure, and site was verified    Location:  Long finger  Site:  R long flexor tendon sheath  Ultrasonic guidance for needle placement?: No    Needle size:  25 G  Approach:  Volar  Medications:  1 mL LIDOcaine HCL 10 mg/ml (1%) 10 mg/mL (1 %); 6 mg betamethasone acetate-betamethasone sodium phosphate 6 mg/mL  Patient tolerance:  Patient tolerated the procedure well with no immediate complications

## 2024-08-22 ENCOUNTER — APPOINTMENT (OUTPATIENT)
Dept: LAB | Facility: HOSPITAL | Age: 59
End: 2024-08-22
Attending: NURSE PRACTITIONER
Payer: COMMERCIAL

## 2024-08-22 DIAGNOSIS — I51.9 MYXEDEMA HEART DISEASE: Primary | ICD-10-CM

## 2024-08-22 DIAGNOSIS — G25.81 RESTLESS LEGS: ICD-10-CM

## 2024-08-22 DIAGNOSIS — E03.9 MYXEDEMA HEART DISEASE: Primary | ICD-10-CM

## 2024-08-22 DIAGNOSIS — R63.4 LOSS OF WEIGHT: ICD-10-CM

## 2024-08-22 DIAGNOSIS — J44.9 VANISHING LUNG: ICD-10-CM

## 2024-08-22 DIAGNOSIS — F51.02 TRANSIENT DISORDER OF INITIATING OR MAINTAINING SLEEP: ICD-10-CM

## 2024-08-22 DIAGNOSIS — G43.C0: ICD-10-CM

## 2024-08-22 DIAGNOSIS — F43.25 ADJUSTMENT DISORDER WITH MIXED DISTURBANCE OF EMOTIONS AND CONDUCT: ICD-10-CM

## 2024-08-22 DIAGNOSIS — J06.9 ACUTE RESPIRATORY DISEASE: ICD-10-CM

## 2024-08-22 DIAGNOSIS — M16.9 HIP ARTHROSIS: ICD-10-CM

## 2024-08-22 LAB
T4 FREE SERPL-MCNC: 0.82 NG/DL (ref 0.7–1.48)
TSH SERPL-ACNC: 1.23 UIU/ML (ref 0.35–4.94)

## 2024-08-22 PROCEDURE — 36415 COLL VENOUS BLD VENIPUNCTURE: CPT

## 2024-08-22 PROCEDURE — 84443 ASSAY THYROID STIM HORMONE: CPT

## 2024-08-22 PROCEDURE — 84439 ASSAY OF FREE THYROXINE: CPT

## 2024-12-17 ENCOUNTER — HOSPITAL ENCOUNTER (OUTPATIENT)
Dept: RADIOLOGY | Facility: HOSPITAL | Age: 59
Discharge: HOME | End: 2024-12-17
Attending: NURSE PRACTITIONER
Payer: MEDICARE

## 2024-12-17 DIAGNOSIS — G25.81 RESTLESS LEGS: ICD-10-CM

## 2024-12-17 DIAGNOSIS — E03.9 PRIMARY HYPOTHYROIDISM: ICD-10-CM

## 2024-12-17 DIAGNOSIS — J44.9 CHRONIC OBSTRUCTIVE PULMONARY DISEASE, UNSPECIFIED COPD TYPE: ICD-10-CM

## 2024-12-17 DIAGNOSIS — R20.2 TINGLING SENSATION: ICD-10-CM

## 2024-12-17 DIAGNOSIS — E55.9 VITAMIN D DEFICIENCY DISEASE: ICD-10-CM

## 2024-12-17 DIAGNOSIS — F43.25 ADJUSTMENT DISORDER WITH MIXED DISTURBANCE OF EMOTIONS AND CONDUCT: ICD-10-CM

## 2024-12-17 DIAGNOSIS — I10 HYPERTENSION, ESSENTIAL: ICD-10-CM

## 2024-12-17 DIAGNOSIS — M25.50 PAIN IN JOINT, MULTIPLE SITES: ICD-10-CM

## 2024-12-17 DIAGNOSIS — G43.C0: ICD-10-CM

## 2024-12-17 DIAGNOSIS — M79.673 FOOT PAIN: ICD-10-CM

## 2024-12-17 PROCEDURE — 73630 X-RAY EXAM OF FOOT: CPT | Mod: TC,RT

## 2025-01-13 ENCOUNTER — LAB VISIT (OUTPATIENT)
Dept: LAB | Facility: HOSPITAL | Age: 60
End: 2025-01-13
Attending: NURSE PRACTITIONER
Payer: MEDICARE

## 2025-01-13 DIAGNOSIS — D64.9 ANEMIA, UNSPECIFIED TYPE: Primary | ICD-10-CM

## 2025-01-13 DIAGNOSIS — M77.30 CALCANEAL SPUR, UNSPECIFIED LATERALITY: ICD-10-CM

## 2025-01-13 LAB
BASOPHILS # BLD AUTO: 0.05 X10(3)/MCL
BASOPHILS NFR BLD AUTO: 0.8 %
EOSINOPHIL # BLD AUTO: 0.08 X10(3)/MCL (ref 0–0.9)
EOSINOPHIL NFR BLD AUTO: 1.3 %
ERYTHROCYTE [DISTWIDTH] IN BLOOD BY AUTOMATED COUNT: 19.4 % (ref 11.5–17)
HCT VFR BLD AUTO: 31.1 % (ref 37–47)
HGB BLD-MCNC: 8.9 G/DL (ref 12–16)
IMM GRANULOCYTES # BLD AUTO: 0.01 X10(3)/MCL (ref 0–0.04)
IMM GRANULOCYTES NFR BLD AUTO: 0.2 %
IRON SATN MFR SERPL: 3 % (ref 20–50)
IRON SERPL-MCNC: 13 UG/DL (ref 50–170)
LYMPHOCYTES # BLD AUTO: 2.1 X10(3)/MCL (ref 0.6–4.6)
LYMPHOCYTES NFR BLD AUTO: 35.2 %
MCH RBC QN AUTO: 20.1 PG (ref 27–31)
MCHC RBC AUTO-ENTMCNC: 28.6 G/DL (ref 33–36)
MCV RBC AUTO: 70.4 FL (ref 80–94)
MONOCYTES # BLD AUTO: 0.4 X10(3)/MCL (ref 0.1–1.3)
MONOCYTES NFR BLD AUTO: 6.7 %
NEUTROPHILS # BLD AUTO: 3.32 X10(3)/MCL (ref 2.1–9.2)
NEUTROPHILS NFR BLD AUTO: 55.8 %
NRBC BLD AUTO-RTO: 0 %
PLATELET # BLD AUTO: 519 X10(3)/MCL (ref 130–400)
PMV BLD AUTO: 9.4 FL (ref 7.4–10.4)
RBC # BLD AUTO: 4.42 X10(6)/MCL (ref 4.2–5.4)
TIBC SERPL-MCNC: 451 UG/DL (ref 70–310)
TIBC SERPL-MCNC: 464 UG/DL (ref 250–450)
TRANSFERRIN SERPL-MCNC: 449 MG/DL (ref 180–382)
WBC # BLD AUTO: 5.96 X10(3)/MCL (ref 4.5–11.5)

## 2025-01-13 PROCEDURE — 36415 COLL VENOUS BLD VENIPUNCTURE: CPT

## 2025-01-13 PROCEDURE — 83550 IRON BINDING TEST: CPT

## 2025-01-13 PROCEDURE — 85025 COMPLETE CBC W/AUTO DIFF WBC: CPT

## 2025-01-15 DIAGNOSIS — D64.9 ANEMIA: Primary | ICD-10-CM

## 2025-01-29 ENCOUNTER — OFFICE VISIT (OUTPATIENT)
Dept: HEMATOLOGY/ONCOLOGY | Facility: CLINIC | Age: 60
End: 2025-01-29
Payer: MEDICARE

## 2025-01-29 VITALS — WEIGHT: 161.31 LBS | BODY MASS INDEX: 28.57 KG/M2

## 2025-01-29 DIAGNOSIS — F17.200 NEEDS SMOKING CESSATION EDUCATION: ICD-10-CM

## 2025-01-29 DIAGNOSIS — D75.839 THROMBOCYTOSIS: ICD-10-CM

## 2025-01-29 DIAGNOSIS — D50.9 IRON DEFICIENCY ANEMIA, UNSPECIFIED IRON DEFICIENCY ANEMIA TYPE: Primary | ICD-10-CM

## 2025-01-29 DIAGNOSIS — D64.9 ANEMIA: ICD-10-CM

## 2025-01-29 DIAGNOSIS — Z98.84 S/P GASTRIC BYPASS: ICD-10-CM

## 2025-01-29 LAB
FOLATE SERPL-MCNC: 6.5 NG/ML (ref 7–31.4)
VIT B12 SERPL-MCNC: 449 PG/ML (ref 213–816)

## 2025-01-29 PROCEDURE — 1160F RVW MEDS BY RX/DR IN RCRD: CPT | Mod: CPTII,S$GLB,, | Performed by: INTERNAL MEDICINE

## 2025-01-29 PROCEDURE — 82607 VITAMIN B-12: CPT | Performed by: INTERNAL MEDICINE

## 2025-01-29 PROCEDURE — 1159F MED LIST DOCD IN RCRD: CPT | Mod: CPTII,S$GLB,, | Performed by: INTERNAL MEDICINE

## 2025-01-29 PROCEDURE — 99999 PR PBB SHADOW E&M-EST. PATIENT-LVL III: CPT | Mod: PBBFAC,,, | Performed by: INTERNAL MEDICINE

## 2025-01-29 PROCEDURE — 3008F BODY MASS INDEX DOCD: CPT | Mod: CPTII,S$GLB,, | Performed by: INTERNAL MEDICINE

## 2025-01-29 PROCEDURE — 99204 OFFICE O/P NEW MOD 45 MIN: CPT | Mod: S$GLB,,, | Performed by: INTERNAL MEDICINE

## 2025-01-29 PROCEDURE — 82746 ASSAY OF FOLIC ACID SERUM: CPT | Performed by: INTERNAL MEDICINE

## 2025-01-29 PROCEDURE — 36415 COLL VENOUS BLD VENIPUNCTURE: CPT | Performed by: INTERNAL MEDICINE

## 2025-01-29 RX ORDER — SUMATRIPTAN SUCCINATE 25 MG/1
25 TABLET ORAL EVERY 6 HOURS PRN
COMMUNITY
Start: 2024-12-23

## 2025-01-29 RX ORDER — CHOLECALCIFEROL (VITAMIN D3) 1250 MCG
50000 CAPSULE ORAL
COMMUNITY
Start: 2025-01-13

## 2025-01-29 RX ORDER — MEGESTROL ACETATE 125 MG/ML
SUSPENSION ORAL
COMMUNITY
Start: 2024-12-12

## 2025-01-29 RX ORDER — QUETIAPINE FUMARATE 400 MG/1
400 TABLET, FILM COATED ORAL NIGHTLY
COMMUNITY
Start: 2025-01-23

## 2025-01-29 RX ORDER — ONDANSETRON 4 MG/1
4 TABLET, ORALLY DISINTEGRATING ORAL EVERY 6 HOURS PRN
COMMUNITY
Start: 2024-12-21

## 2025-01-29 NOTE — PROGRESS NOTES
"HEMATOLOGY/ONCOLOGY OFFICE CLINIC VISIT    Visit Information:    Initial Evaluation: 25  Referring Provider: David Soto NP  Other providers:  Code status:    Diagnosis:    Present treatment:    Treatment/Oncology history:    Goal of care:     Imaging:    Pathology:      CLINICAL HISTORY:       Patient: Jo-Ann Wray is a 59 y.o. female. with history of      kindly referred for         Chief Complaint: Anemia    Interval History:  Patient her for new pt appt, anemia, referred by David Soto NP. She is accompanied today by her friend/neighbor. Patient has a history of drug/alcohol abuse, currently she is not using. (In recovery for 2 years) Patient states she is currently taking Iron supplement by mouth. Patient reports having gastric bypass in the past.       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     Alcohol abuse     Anxiety     Asthma     Bipolar 1 disorder     COPD (chronic obstructive pulmonary disease)     Depression     Fatigue     History of psychiatric hospitalization     Hx of psychiatric care     Hypothyroid     Psychiatric problem     Depression, Anxiety, Suicidal Ideations    Psychosis     hx of drug induced psychosis    Sleep difficulties     Suicide attempt     ,  - OD on "medications"    Therapy     [facility]    Withdrawal symptoms, alcohol     Withdrawal symptoms, drug or narcotic       Past Surgical History:   Procedure Laterality Date    CARPAL TUNNEL RELEASE       SECTION      GASTRIC BYPASS       Family History   Problem Relation Name Age of Onset    Dementia Mother Gisell     Heart attack Mother Gisell     Stroke Mother Gisell           Review of patient's allergies indicates:   Allergen Reactions    Beef containing products      Indigestion, no anaphylaxis    Citrus and derivatives      Indigestion, no anaphylaxis    Methadone Other (See Comments)     "I'm not myself."    Tomato      Indigestion, " no anaphylaxis      Current Outpatient Medications on File Prior to Visit   Medication Sig Dispense Refill    albuterol (PROVENTIL/VENTOLIN HFA) 90 mcg/actuation inhaler Inhale into the lungs every 6 (six) hours as needed for Wheezing. Rescue      busPIRone (BUSPAR) 30 MG Tab Take 30 mg by mouth 2 (two) times daily.      cyproheptadine (PERIACTIN) 4 mg tablet Take 4 mg by mouth nightly.      DECARA 1,250 mcg (50,000 unit) capsule Take 50,000 Units by mouth every 7 days.      divalproex (DEPAKOTE) 250 MG EC tablet Take 1 tablet (250 mg total) by mouth 2 (two) times a day. 60 tablet 0    DULoxetine (CYMBALTA) 60 MG capsule Take 1 capsule (60 mg total) by mouth once daily. 30 capsule 0    ferrous sulfate 325 (65 FE) MG EC tablet Take 1 tablet (325 mg total) by mouth every 48 hours.  0    folic acid (FOLVITE) 1 MG tablet Take 1 tablet (1 mg total) by mouth once daily. 30 tablet 0    gabapentin (NEURONTIN) 300 MG capsule Take 1 capsule (300 mg total) by mouth 3 (three) times daily. 120 capsule 0    levothyroxine (SYNTHROID) 88 MCG tablet Take by mouth before breakfast. Last filled       megestroL (MEGACE ES) 625 mg/5 mL (125 mg/mL) Susp SMARTSI Milliliter(s) By Mouth Daily      meloxicam (MOBIC) 15 MG tablet Take 15 mg by mouth daily as needed for Pain (pain).      ondansetron (ZOFRAN-ODT) 4 MG TbDL Take 4 mg by mouth every 6 (six) hours as needed.      pantoprazole (PROTONIX) 40 MG tablet Take 40 mg by mouth before dinner. Last filled       QUEtiapine (SEROQUEL) 400 MG tablet Take 400 mg by mouth every evening.      ramelteon (ROZEREM) 8 mg tablet Take 1 tablet (8 mg total) by mouth every evening. 30 tablet 0    rOPINIRole (REQUIP) 1 MG tablet Take 1 mg by mouth every evening.      sumatriptan (IMITREX) 25 MG Tab Take 25 mg by mouth every 6 (six) hours as needed.      [DISCONTINUED] cyanocobalamin (VITAMIN B-12) 100 MCG tablet Take 1 tablet (100 mcg total) by mouth once daily. (Patient not taking: Reported on  10/18/2023)      [DISCONTINUED] senna (SENOKOT) 8.6 mg tablet Take 1 tablet by mouth daily as needed for Constipation. (Patient not taking: Reported on 10/18/2023)      [DISCONTINUED] thiamine 100 MG tablet Take 1 tablet (100 mg total) by mouth once daily. (Patient not taking: Reported on 10/18/2023)      [DISCONTINUED] traZODone (DESYREL) 100 MG tablet Take 100 mg by mouth.       No current facility-administered medications on file prior to visit.      Review of Systems           Vitals:    01/29/25 1334   Weight: 73.2 kg (161 lb 4.8 oz)      Wt Readings from Last 6 Encounters:   01/29/25 73.2 kg (161 lb 4.8 oz)   10/18/23 68.5 kg (151 lb)   11/27/22 65.4 kg (144 lb 2.9 oz)   11/27/22 67.1 kg (148 lb)   10/18/19 68.2 kg (150 lb 5.7 oz)   10/18/19 75 kg (165 lb 5.5 oz)     Body mass index is 28.57 kg/m².  Body surface area is 1.8 meters squared.  Physical Exam  ECOG SCORE             Laboratory:  CBC with Differential:  Lab Results   Component Value Date    WBC 5.96 01/13/2025    RBC 4.42 01/13/2025    HGB 8.9 (L) 01/13/2025    HCT 31.1 (L) 01/13/2025    MCV 70.4 (L) 01/13/2025    MCH 20.1 (L) 01/13/2025    MCHC 28.6 (L) 01/13/2025    RDW 19.4 (H) 01/13/2025     (H) 01/13/2025    MPV 9.4 01/13/2025    GRAN 2.9 10/19/2019    GRAN 49.4 10/19/2019    LYMPH 2.4 10/19/2019    LYMPH 42.0 10/19/2019    MONO 0.4 10/19/2019    MONO 7.2 10/19/2019    EOS 0.1 10/19/2019    BASO 0.02 10/19/2019    EOSINOPHIL 1.4 10/19/2019    BASOPHIL 0.3 10/19/2019       Latest Reference Range & Units 07/05/23 11:49 12/17/24 10:34 01/13/25 13:35   WBC 4.50 - 11.50 x10(3)/mcL 5.49 7.35 5.96   RBC 4.20 - 5.40 x10(6)/mcL 3.97 (L) 4.35 4.42   Hemoglobin 12.0 - 16.0 g/dL 10.0 (L) 8.8 (L) 8.9 (L)   Hematocrit 37.0 - 47.0 % 33.2 (L) 29.7 (L) 31.1 (L)   MCV 80.0 - 94.0 fL 83.6 68.3 (L) 70.4 (L)   MCH 27.0 - 31.0 pg 25.2 (L) 20.2 (L) 20.1 (L)   MCHC 33.0 - 36.0 g/dL 30.1 (L) 29.6 (L) 28.6 (L)   RDW 11.5 - 17.0 % 16.1 18.1 (H) 19.4 (H)   Platelet  Count 130 - 400 x10(3)/mcL 350 517 (H) 519 (H)      Latest Reference Range & Units 01/13/25 13:35   Iron 50 - 170 ug/dL 13 (L)   TIBC 250 - 450 ug/dL 464 (H)   UIBC 70 - 310 ug/dL 451 (H)   Transferrin 180 - 382 mg/dL 449 (H)   Iron Saturation 20 - 50 % 3 (L)   (L): Data is abnormally low  (H): Data is abnormally high    CMP:  Sodium   Date Value Ref Range Status   12/17/2024 140 136 - 145 mmol/L Final   10/19/2019 139 136 - 145 mmol/L Final     Potassium   Date Value Ref Range Status   12/17/2024 4.8 3.5 - 5.1 mmol/L Final   10/19/2019 4.0 3.5 - 5.1 mmol/L Final     Chloride   Date Value Ref Range Status   12/17/2024 105 98 - 107 mmol/L Final   10/19/2019 108 95 - 110 mmol/L Final     CO2   Date Value Ref Range Status   12/17/2024 27 22 - 29 mmol/L Final   10/19/2019 24 23 - 29 mmol/L Final     Glucose   Date Value Ref Range Status   10/19/2019 110 70 - 110 mg/dL Final     BUN   Date Value Ref Range Status   10/19/2019 18 (H) 7 - 17 mg/dL Final     Blood Urea Nitrogen   Date Value Ref Range Status   12/17/2024 18.0 9.8 - 20.1 mg/dL Final     Creatinine   Date Value Ref Range Status   12/17/2024 0.76 0.55 - 1.02 mg/dL Final   10/19/2019 0.49 (L) 0.50 - 1.40 mg/dL Final     Calcium   Date Value Ref Range Status   12/17/2024 9.0 8.4 - 10.2 mg/dL Final   10/19/2019 8.7 8.7 - 10.5 mg/dL Final     Total Protein   Date Value Ref Range Status   10/19/2019 6.9 6.0 - 8.4 g/dL Final     Albumin   Date Value Ref Range Status   12/17/2024 3.7 3.5 - 5.0 g/dL Final   10/19/2019 3.7 3.5 - 5.2 g/dL Final     Total Bilirubin   Date Value Ref Range Status   10/19/2019 0.4 0.1 - 1.0 mg/dL Final     Comment:     For infants and newborns, interpretation of results should be based  on gestational age, weight and in agreement with clinical  observations.  Premature Infant recommended reference ranges:  Up to 24 hours.............<8.0 mg/dL  Up to 48 hours............<12.0 mg/dL  3-5 days..................<15.0 mg/dL  6-29  days.................<15.0 mg/dL       Bilirubin Total   Date Value Ref Range Status   12/17/2024 0.4 <=1.5 mg/dL Final     Alkaline Phosphatase   Date Value Ref Range Status   10/19/2019 71 38 - 126 U/L Final     ALP   Date Value Ref Range Status   12/17/2024 69 40 - 150 unit/L Final     AST   Date Value Ref Range Status   12/17/2024 38 (H) 5 - 34 unit/L Final   10/19/2019 23 15 - 46 U/L Final     ALT   Date Value Ref Range Status   12/17/2024 46 0 - 55 unit/L Final   10/19/2019 15 10 - 44 U/L Final     Anion Gap   Date Value Ref Range Status   10/19/2019 7 (L) 8 - 16 mmol/L Final     eGFR if    Date Value Ref Range Status   10/19/2019 >60.0 >60 mL/min/1.73 m^2 Final     eGFR if non    Date Value Ref Range Status   10/19/2019 >60.0 >60 mL/min/1.73 m^2 Final     Comment:     Calculation used to obtain the estimated glomerular filtration  rate (eGFR) is the CKD-EPI equation.          Latest Reference Range & Units 12/17/24 10:34   TSH 0.350 - 4.940 uIU/mL 2.858   T3, Free 1.58 - 3.91 pg/mL 2.96   Free T4 0.70 - 1.48 ng/dL 1.33          Assessment:       1. Iron deficiency anemia, unspecified iron deficiency anemia type    2. Thrombocytosis    3. Anemia              Plan:   Labs today - Vitamin B12 & Folate  Will start iron infusions   RTC in 3 months with labs 1-3 days prior   Labs:  CBC, CMP, Iron Profile, Folate, Vitamin B12        Shonda Allison MD  Hematology/Oncology    Professional Services   I, Felicita Guzman LPN, acted solely as a scribe for and in the presence of Dr. Shonda Allison, who performed these services.

## 2025-01-29 NOTE — PROGRESS NOTES
"HEMATOLOGY/ONCOLOGY OFFICE CLINIC VISIT    Visit Information:    Initial Evaluation: 2025   Referring Provider: David Soto NP  Other providers:  Code status:    Diagnosis:  Anemia  Thrombocytosis    Present treatment:    Treatment/Oncology history:    Goal of care:     Imaging:    Pathology:      CLINICAL HISTORY:       Patient: Jo-Ann Wray is a 59 y.o. female.  With complicated medical history including hypothyroidism COPD, bipolar disorder, history of alcohol and drug abuse, suicide attempts  kindly referred for anemia.    She is accompanied today by her friend/neighbor. Patient has a history of drug/alcohol abuse, currently she is not using. (In recovery for 2 years) Patient states she is currently taking Iron supplement by mouth. She just started them. Patient reports having gastric bypass in the past.     Patient denies any fever, chills, sweats.  No chest pain or short of breath.  She denies any bright red blood per rectum or melena.  No bleeding.    Chief Complaint: New Patient (Referred by HOLLY Soto for Anemia.)      Interval History:        Past Medical History:   Diagnosis Date    Addiction to drug     Alcohol abuse     Anxiety     Asthma     Bipolar 1 disorder     COPD (chronic obstructive pulmonary disease)     Depression     Fatigue     History of psychiatric hospitalization     Hx of psychiatric care     Hypothyroid     Psychiatric problem     Depression, Anxiety, Suicidal Ideations    Psychosis     hx of drug induced psychosis    Sleep difficulties     Suicide attempt     ,  - OD on "medications"    Therapy     [facility] [place]    Withdrawal symptoms, alcohol     Withdrawal symptoms, drug or narcotic       Past Surgical History:   Procedure Laterality Date    CARPAL TUNNEL RELEASE       SECTION      GASTRIC BYPASS       Family History   Problem Relation Name Age of Onset    Dementia Mother Gisell     Heart attack Mother Gisell     " "Stroke Mother Gisell           Review of patient's allergies indicates:   Allergen Reactions    Beef containing products      Indigestion, no anaphylaxis    Citrus and derivatives      Indigestion, no anaphylaxis    Methadone Other (See Comments)     "I'm not myself."    Tomato      Indigestion, no anaphylaxis      Current Outpatient Medications on File Prior to Visit   Medication Sig Dispense Refill    albuterol (PROVENTIL/VENTOLIN HFA) 90 mcg/actuation inhaler Inhale into the lungs every 6 (six) hours as needed for Wheezing. Rescue      busPIRone (BUSPAR) 30 MG Tab Take 30 mg by mouth 2 (two) times daily.      cyproheptadine (PERIACTIN) 4 mg tablet Take 4 mg by mouth nightly.      DECARA 1,250 mcg (50,000 unit) capsule Take 50,000 Units by mouth every 7 days.      divalproex (DEPAKOTE) 250 MG EC tablet Take 1 tablet (250 mg total) by mouth 2 (two) times a day. 60 tablet 0    DULoxetine (CYMBALTA) 60 MG capsule Take 1 capsule (60 mg total) by mouth once daily. 30 capsule 0    ferrous sulfate 325 (65 FE) MG EC tablet Take 1 tablet (325 mg total) by mouth every 48 hours.  0    folic acid (FOLVITE) 1 MG tablet Take 1 tablet (1 mg total) by mouth once daily. 30 tablet 0    gabapentin (NEURONTIN) 300 MG capsule Take 1 capsule (300 mg total) by mouth 3 (three) times daily. 120 capsule 0    levothyroxine (SYNTHROID) 88 MCG tablet Take by mouth before breakfast. Last filled       megestroL (MEGACE ES) 625 mg/5 mL (125 mg/mL) Susp SMARTSI Milliliter(s) By Mouth Daily      meloxicam (MOBIC) 15 MG tablet Take 15 mg by mouth daily as needed for Pain (pain).      ondansetron (ZOFRAN-ODT) 4 MG TbDL Take 4 mg by mouth every 6 (six) hours as needed.      pantoprazole (PROTONIX) 40 MG tablet Take 40 mg by mouth before dinner. Last filled       QUEtiapine (SEROQUEL) 400 MG tablet Take 400 mg by mouth every evening.      ramelteon (ROZEREM) 8 mg tablet Take 1 tablet (8 mg total) by mouth every evening. 30 tablet 0    " rOPINIRole (REQUIP) 1 MG tablet Take 1 mg by mouth every evening.      sumatriptan (IMITREX) 25 MG Tab Take 25 mg by mouth every 6 (six) hours as needed.      [DISCONTINUED] cyanocobalamin (VITAMIN B-12) 100 MCG tablet Take 1 tablet (100 mcg total) by mouth once daily. (Patient not taking: Reported on 10/18/2023)      [DISCONTINUED] senna (SENOKOT) 8.6 mg tablet Take 1 tablet by mouth daily as needed for Constipation. (Patient not taking: Reported on 10/18/2023)      [DISCONTINUED] thiamine 100 MG tablet Take 1 tablet (100 mg total) by mouth once daily. (Patient not taking: Reported on 10/18/2023)      [DISCONTINUED] traZODone (DESYREL) 100 MG tablet Take 100 mg by mouth.       No current facility-administered medications on file prior to visit.      Review of Systems   Constitutional:  Negative for activity change, appetite change, chills, fatigue, fever, night sweats and unexpected weight change.   HENT:  Negative for mouth dryness, mouth sores, nosebleeds, sore throat and trouble swallowing.    Eyes: Negative.    Respiratory:  Negative for cough and shortness of breath.    Cardiovascular:  Negative for chest pain, palpitations and leg swelling.   Gastrointestinal:  Negative for abdominal distention, abdominal pain, blood in stool, change in bowel habit, constipation, diarrhea, nausea and vomiting.   Endocrine: Negative.    Genitourinary:  Negative for dysuria, frequency, hematuria and urgency.   Musculoskeletal:  Negative for arthralgias, back pain, myalgias and neck pain.   Integumentary:  Negative for rash.   Neurological:  Negative for dizziness, tremors, syncope, speech difficulty, weakness, light-headedness, numbness, headaches and memory loss.   Hematological:  Does not bruise/bleed easily.   Psychiatric/Behavioral:  Negative for confusion and suicidal ideas.               Vitals:    01/29/25 1334   Weight: 73.2 kg (161 lb 4.8 oz)      Wt Readings from Last 6 Encounters:   01/29/25 73.2 kg (161 lb 4.8 oz)    10/18/23 68.5 kg (151 lb)   11/27/22 67.1 kg (148 lb)   10/18/19 75 kg (165 lb 5.5 oz)   09/24/19 69 kg (152 lb 1.9 oz)   08/06/19 68.5 kg (151 lb)     Body mass index is 28.57 kg/m².  Body surface area is 1.8 meters squared.  Physical Exam  Vitals and nursing note reviewed.   Constitutional:       General: She is not in acute distress.  HENT:      Head: Normocephalic and atraumatic.      Mouth/Throat:      Mouth: Mucous membranes are moist.   Eyes:      General: No scleral icterus.     Extraocular Movements: Extraocular movements intact.      Conjunctiva/sclera: Conjunctivae normal.      Pupils: Pupils are equal, round, and reactive to light.   Neck:      Vascular: No JVD.   Cardiovascular:      Rate and Rhythm: Normal rate and regular rhythm.      Heart sounds: No murmur heard.  Pulmonary:      Effort: Pulmonary effort is normal.      Breath sounds: Normal breath sounds. No wheezing or rhonchi.   Abdominal:      General: Bowel sounds are normal. There is no distension.      Palpations: Abdomen is soft. There is no mass.      Tenderness: There is no abdominal tenderness.   Musculoskeletal:         General: No swelling or deformity.      Cervical back: Neck supple.   Lymphadenopathy:      Head:      Right side of head: No submental or submandibular adenopathy.      Left side of head: No submental or submandibular adenopathy.      Cervical: No cervical adenopathy.      Lower Body: No right inguinal adenopathy. No left inguinal adenopathy.   Skin:     General: Skin is warm.      Coloration: Skin is not jaundiced.      Findings: No lesion or rash.      Nails: There is no clubbing.   Neurological:      Mental Status: She is alert and oriented to person, place, and time.      Cranial Nerves: Cranial nerves 2-12 are intact.   Psychiatric:         Attention and Perception: Attention normal.         Mood and Affect: Affect is flat.         Behavior: Behavior is cooperative.         Cognition and Memory: Cognition is  impaired (mild).         Judgment: Judgment normal.       Laboratory:  CBC with Differential:  Lab Results   Component Value Date    WBC 5.96 01/13/2025    RBC 4.42 01/13/2025    HGB 8.9 (L) 01/13/2025    HCT 31.1 (L) 01/13/2025    MCV 70.4 (L) 01/13/2025    MCH 20.1 (L) 01/13/2025    MCHC 28.6 (L) 01/13/2025    RDW 19.4 (H) 01/13/2025     (H) 01/13/2025    MPV 9.4 01/13/2025    GRAN 2.9 10/19/2019    GRAN 49.4 10/19/2019    LYMPH 2.4 10/19/2019    LYMPH 42.0 10/19/2019    MONO 0.4 10/19/2019    MONO 7.2 10/19/2019    EOS 0.1 10/19/2019    BASO 0.02 10/19/2019    EOSINOPHIL 1.4 10/19/2019    BASOPHIL 0.3 10/19/2019       Latest Reference Range & Units 07/05/23 11:49 12/17/24 10:34 01/13/25 13:35   WBC 4.50 - 11.50 x10(3)/mcL 5.49 7.35 5.96   RBC 4.20 - 5.40 x10(6)/mcL 3.97 (L) 4.35 4.42   Hemoglobin 12.0 - 16.0 g/dL 10.0 (L) 8.8 (L) 8.9 (L)   Hematocrit 37.0 - 47.0 % 33.2 (L) 29.7 (L) 31.1 (L)   MCV 80.0 - 94.0 fL 83.6 68.3 (L) 70.4 (L)   MCH 27.0 - 31.0 pg 25.2 (L) 20.2 (L) 20.1 (L)   MCHC 33.0 - 36.0 g/dL 30.1 (L) 29.6 (L) 28.6 (L)   RDW 11.5 - 17.0 % 16.1 18.1 (H) 19.4 (H)   Platelet Count 130 - 400 x10(3)/mcL 350 517 (H) 519 (H)      Latest Reference Range & Units 01/13/25 13:35   Iron 50 - 170 ug/dL 13 (L)   TIBC 250 - 450 ug/dL 464 (H)   UIBC 70 - 310 ug/dL 451 (H)   Transferrin 180 - 382 mg/dL 449 (H)   Iron Saturation 20 - 50 % 3 (L)   (L): Data is abnormally low  (H): Data is abnormally high    CMP:  Sodium   Date Value Ref Range Status   12/17/2024 140 136 - 145 mmol/L Final   10/19/2019 139 136 - 145 mmol/L Final     Potassium   Date Value Ref Range Status   12/17/2024 4.8 3.5 - 5.1 mmol/L Final   10/19/2019 4.0 3.5 - 5.1 mmol/L Final     Chloride   Date Value Ref Range Status   12/17/2024 105 98 - 107 mmol/L Final   10/19/2019 108 95 - 110 mmol/L Final     CO2   Date Value Ref Range Status   12/17/2024 27 22 - 29 mmol/L Final   10/19/2019 24 23 - 29 mmol/L Final     Glucose   Date Value Ref Range  Status   10/19/2019 110 70 - 110 mg/dL Final     BUN   Date Value Ref Range Status   10/19/2019 18 (H) 7 - 17 mg/dL Final     Blood Urea Nitrogen   Date Value Ref Range Status   12/17/2024 18.0 9.8 - 20.1 mg/dL Final     Creatinine   Date Value Ref Range Status   12/17/2024 0.76 0.55 - 1.02 mg/dL Final   10/19/2019 0.49 (L) 0.50 - 1.40 mg/dL Final     Calcium   Date Value Ref Range Status   12/17/2024 9.0 8.4 - 10.2 mg/dL Final   10/19/2019 8.7 8.7 - 10.5 mg/dL Final     Total Protein   Date Value Ref Range Status   10/19/2019 6.9 6.0 - 8.4 g/dL Final     Albumin   Date Value Ref Range Status   12/17/2024 3.7 3.5 - 5.0 g/dL Final   10/19/2019 3.7 3.5 - 5.2 g/dL Final     Total Bilirubin   Date Value Ref Range Status   10/19/2019 0.4 0.1 - 1.0 mg/dL Final     Comment:     For infants and newborns, interpretation of results should be based  on gestational age, weight and in agreement with clinical  observations.  Premature Infant recommended reference ranges:  Up to 24 hours.............<8.0 mg/dL  Up to 48 hours............<12.0 mg/dL  3-5 days..................<15.0 mg/dL  6-29 days.................<15.0 mg/dL       Bilirubin Total   Date Value Ref Range Status   12/17/2024 0.4 <=1.5 mg/dL Final     Alkaline Phosphatase   Date Value Ref Range Status   10/19/2019 71 38 - 126 U/L Final     ALP   Date Value Ref Range Status   12/17/2024 69 40 - 150 unit/L Final     AST   Date Value Ref Range Status   12/17/2024 38 (H) 5 - 34 unit/L Final   10/19/2019 23 15 - 46 U/L Final     ALT   Date Value Ref Range Status   12/17/2024 46 0 - 55 unit/L Final   10/19/2019 15 10 - 44 U/L Final     Anion Gap   Date Value Ref Range Status   10/19/2019 7 (L) 8 - 16 mmol/L Final     eGFR if    Date Value Ref Range Status   10/19/2019 >60.0 >60 mL/min/1.73 m^2 Final     eGFR if non    Date Value Ref Range Status   10/19/2019 >60.0 >60 mL/min/1.73 m^2 Final     Comment:     Calculation used to obtain the  estimated glomerular filtration  rate (eGFR) is the CKD-EPI equation.          Latest Reference Range & Units 12/17/24 10:34   TSH 0.350 - 4.940 uIU/mL 2.858   T3, Free 1.58 - 3.91 pg/mL 2.96   Free T4 0.70 - 1.48 ng/dL 1.33          Assessment:       1. Iron deficiency anemia, unspecified iron deficiency anemia type    2. Thrombocytosis    3. Anemia      I have discussed with the patient what anemia is, workup, causes and treatment depending of the cause. Discussed that anemia is a condition that develops when the blood lacks enough healthy red blood cells or hemoglobin. Hemoglobin is a main part of red blood cells and binds oxygen. There are different causes of anemia but the most common includes: Anemia caused by blood loss, decreased or faulty red blood cell production or anemia caused by destruction of red blood cells.  Anemia can be caused by  Gastrointestinal conditions such as ulcers, hemorrhoids, gastritis (inflammation of the stomach), use of nonsteroidal anti-inflammatory drugs (NSAIDs) such as aspirin or ibuprofen, which can cause ulcers and gastritis and cancer, ie, gastric, colorectal. Anemia can be cause by Iron-deficiency or other Vitamin deficiency, Bone marrow and stem cell problems, ie MDS, Multiple Myeloma, Lymphoma,etc.  It can be associated with other conditions, ie, Advanced kidney disease, Hypothyroidism, chronic diseases, such as cancer, infection, lupus, diabetes, and rheumatoid arthritis.         Patient underwent gastric bypass surgery for weight loss. Iron deficiency and anemia are common after a gastric bypass or other weight-loss surgery. Iron deficiency happens because of the changes made during the surgery. Most of the iron from foods is absorbed in the first part of the small intestine, the duodenum. But after a gastric bypass procedure, food bypasses this part of the body before minerals and vitamins can be absorbed. This can lead to iron deficiency and other nutrition problems, ie  B12, folate deficiency, etc. Patient understands that may need frequent infusional iron due to inadequate absorption of iron.  All this was discussed with the patient and all questions were answered to satisfaction.     Discussed with the patient that Thrombocytosis is a disorder in which ther are too many platelets, which play an important role in blood clotting. There are two types of thrombocytosis:   Primary thrombocytosis: this is secondary to a blood and bone marrow disease. When caused by a bone marrow disorder, thrombocytosis is called primary or essential thrombocytosis or essential thrombocythemia. The bone marrow overproduces the cells that form platelets, releasing too many platelets into the peripheral blood resulting in a high platelet count.  Secondary or reactive thrombocytosis: is caused by an underlying condition. Reactive thrombocytosis causes include: Acute bleeding and blood loss, Allergic reactions, Cancer, Chronic kidney disorder, Exercise, Heart attack, Infections, Iron deficiency anemia, Removal of your spleen, Hemolytic anemia or autoimmune disorders, Inflammation, such as from rheumatoid arthritis, celiac disease, connective tissue disorders or inflammatory bowel disease, Major surgery, Pancreatitis, Trauma, Medications that can cause reactive thrombocytosis include:  Epinephrine (Adrenalin Chloride, EpiPen), Tretinoin , Vincristine, etc           Plan:         Patient with iron-deficiency anemia likely related to her gastric bypass surgery.  I will defer further specialized studies for anemia workup.  Thrombocytosis likely reactive and due to iron-deficiency.  I will replace the iron IV as she likely does not absorb the iron due to her previous surgery.  After iron is replaced if anemia persists then we will do more specialized tests and +/-bone marrow biopsy.    Labs today - Vitamin B12 & Folate  Will start iron infusions   RTC in 3 months or earlier if needed with labs 1-3 days prior    Labs:  CBC, CMP, Iron Profile, Folate, Vitamin B12  If vit B 12 low then will start B 12 injections same day of iron infusion    We will defer bone marrow biopsy for now but may need in the future if anemia persists  The patient was seen, interviewed and examined. Pertinent lab and radiology studies were reviewed.   The patient was given ample opportunity to ask questions, and to the best of my abilities, all questions answered to satisfaction; patient demonstrated understanding of what we discussed and agreeable to the plan. Pt instructed to call should develop concerning signs/symptoms or have further questions.     I'd like to thank for referring and allowing me the opportunity to participate in the care of this patient and if any questions, please do not hesitate to call the office at [phone].         Shonda Allison MD  Hematology/Oncology        Total time spent in counseling and discussion about further management options including relevant lab work, treatment,  prognosis, medications and intended side effects was more than 45 minutes. More than 50% of the time was spent on counseling and coordination of care.  I spent a total of 60 minutes on the day of the visit.This includes face to face time and non-face to face time preparing to see the patient (eg, review of tests), Obtaining and/or reviewing separately obtained history, Documenting clinical information in the electronic or other health record, Independently interpreting resultsand communicating results to the patient/family/caregiver, or Care coordination.     Professional Services   I, Felicita Guzman LPN, acted solely as a scribe for and in the presence of Dr. Shonda Allison, who performed these services.

## 2025-01-30 RX ORDER — SODIUM CHLORIDE 0.9 % (FLUSH) 0.9 %
10 SYRINGE (ML) INJECTION
OUTPATIENT
Start: 2025-02-07

## 2025-01-30 RX ORDER — HEPARIN 100 UNIT/ML
500 SYRINGE INTRAVENOUS
OUTPATIENT
Start: 2025-02-07

## 2025-01-30 RX ORDER — EPINEPHRINE 0.3 MG/.3ML
0.3 INJECTION SUBCUTANEOUS ONCE AS NEEDED
OUTPATIENT
Start: 2025-02-07

## 2025-01-30 RX ORDER — DIPHENHYDRAMINE HYDROCHLORIDE 50 MG/ML
50 INJECTION INTRAMUSCULAR; INTRAVENOUS ONCE AS NEEDED
OUTPATIENT
Start: 2025-02-07

## 2025-02-19 ENCOUNTER — INFUSION (OUTPATIENT)
Dept: INFUSION THERAPY | Facility: HOSPITAL | Age: 60
End: 2025-02-19
Attending: INTERNAL MEDICINE
Payer: MEDICARE

## 2025-02-19 VITALS
WEIGHT: 167 LBS | TEMPERATURE: 97 F | OXYGEN SATURATION: 99 % | BODY MASS INDEX: 29.58 KG/M2 | HEART RATE: 94 BPM | DIASTOLIC BLOOD PRESSURE: 65 MMHG | SYSTOLIC BLOOD PRESSURE: 130 MMHG

## 2025-02-19 DIAGNOSIS — D50.8 OTHER IRON DEFICIENCY ANEMIA: Primary | ICD-10-CM

## 2025-02-19 PROCEDURE — 25000003 PHARM REV CODE 250: Performed by: NURSE PRACTITIONER

## 2025-02-19 PROCEDURE — 96376 TX/PRO/DX INJ SAME DRUG ADON: CPT

## 2025-02-19 PROCEDURE — 96365 THER/PROPH/DIAG IV INF INIT: CPT

## 2025-02-19 PROCEDURE — 63600175 PHARM REV CODE 636 W HCPCS: Mod: JZ,TB | Performed by: NURSE PRACTITIONER

## 2025-02-19 PROCEDURE — 63600175 PHARM REV CODE 636 W HCPCS: Mod: JW,TB | Performed by: INTERNAL MEDICINE

## 2025-02-19 RX ORDER — EPINEPHRINE 0.3 MG/.3ML
0.3 INJECTION SUBCUTANEOUS ONCE AS NEEDED
Status: DISCONTINUED | OUTPATIENT
Start: 2025-02-19 | End: 2025-02-19 | Stop reason: HOSPADM

## 2025-02-19 RX ORDER — HEPARIN 100 UNIT/ML
500 SYRINGE INTRAVENOUS
Status: DISCONTINUED | OUTPATIENT
Start: 2025-02-19 | End: 2025-02-19 | Stop reason: HOSPADM

## 2025-02-19 RX ORDER — DIPHENHYDRAMINE HYDROCHLORIDE 50 MG/ML
50 INJECTION INTRAMUSCULAR; INTRAVENOUS ONCE AS NEEDED
Status: DISCONTINUED | OUTPATIENT
Start: 2025-02-19 | End: 2025-02-19 | Stop reason: HOSPADM

## 2025-02-19 RX ORDER — SODIUM CHLORIDE 0.9 % (FLUSH) 0.9 %
10 SYRINGE (ML) INJECTION
Status: DISCONTINUED | OUTPATIENT
Start: 2025-02-19 | End: 2025-02-19 | Stop reason: HOSPADM

## 2025-02-19 RX ORDER — HEPARIN 100 UNIT/ML
500 SYRINGE INTRAVENOUS
OUTPATIENT
Start: 2025-02-19

## 2025-02-19 RX ORDER — DIPHENHYDRAMINE HYDROCHLORIDE 50 MG/ML
50 INJECTION INTRAMUSCULAR; INTRAVENOUS ONCE AS NEEDED
OUTPATIENT
Start: 2025-02-19

## 2025-02-19 RX ORDER — EPINEPHRINE 0.3 MG/.3ML
0.3 INJECTION SUBCUTANEOUS ONCE AS NEEDED
OUTPATIENT
Start: 2025-02-19

## 2025-02-19 RX ORDER — SODIUM CHLORIDE 0.9 % (FLUSH) 0.9 %
10 SYRINGE (ML) INJECTION
OUTPATIENT
Start: 2025-02-19

## 2025-02-19 RX ADMIN — IRON DEXTRAN 25 MG: 50 INJECTION INTRAMUSCULAR; INTRAVENOUS at 10:02

## 2025-02-19 RX ADMIN — SODIUM CHLORIDE 975 MG: 9 INJECTION, SOLUTION INTRAVENOUS at 11:02

## 2025-05-01 ENCOUNTER — LAB VISIT (OUTPATIENT)
Dept: LAB | Facility: HOSPITAL | Age: 60
End: 2025-05-01
Attending: NURSE PRACTITIONER
Payer: MEDICARE

## 2025-05-01 DIAGNOSIS — Z00.00 ROUTINE GENERAL MEDICAL EXAMINATION AT A HEALTH CARE FACILITY: Primary | ICD-10-CM

## 2025-05-01 DIAGNOSIS — Z11.3 SCREENING EXAMINATION FOR VENEREAL DISEASE: ICD-10-CM

## 2025-05-01 DIAGNOSIS — Z13.220 SCREENING FOR LIPOID DISORDERS: ICD-10-CM

## 2025-05-01 DIAGNOSIS — Z12.4 SCREENING FOR MALIGNANT NEOPLASM OF THE CERVIX: ICD-10-CM

## 2025-05-01 DIAGNOSIS — Z13.6 SCREENING FOR ISCHEMIC HEART DISEASE: ICD-10-CM

## 2025-05-01 DIAGNOSIS — Z12.11 SPECIAL SCREENING FOR MALIGNANT NEOPLASMS, COLON: ICD-10-CM

## 2025-05-01 LAB
ALBUMIN SERPL-MCNC: 3.6 G/DL (ref 3.5–5)
ALBUMIN/GLOB SERPL: 1.3 RATIO (ref 1.1–2)
ALP SERPL-CCNC: 47 UNIT/L (ref 40–150)
ALT SERPL-CCNC: 17 UNIT/L (ref 0–55)
ANION GAP SERPL CALC-SCNC: 6 MEQ/L
AST SERPL-CCNC: 19 UNIT/L (ref 11–45)
BASOPHILS # BLD AUTO: 0.02 X10(3)/MCL
BASOPHILS NFR BLD AUTO: 0.4 %
BILIRUB SERPL-MCNC: 0.3 MG/DL
BUN SERPL-MCNC: 20.1 MG/DL (ref 9.8–20.1)
CALCIUM SERPL-MCNC: 8.9 MG/DL (ref 8.4–10.2)
CHLORIDE SERPL-SCNC: 107 MMOL/L (ref 98–107)
CHOLEST SERPL-MCNC: 153 MG/DL
CHOLEST/HDLC SERPL: 3 {RATIO} (ref 0–5)
CO2 SERPL-SCNC: 26 MMOL/L (ref 22–29)
CREAT SERPL-MCNC: 0.66 MG/DL (ref 0.55–1.02)
CREAT/UREA NIT SERPL: 30
EOSINOPHIL # BLD AUTO: 0.06 X10(3)/MCL (ref 0–0.9)
EOSINOPHIL NFR BLD AUTO: 1.1 %
ERYTHROCYTE [DISTWIDTH] IN BLOOD BY AUTOMATED COUNT: 19.3 % (ref 11.5–17)
EST. AVERAGE GLUCOSE BLD GHB EST-MCNC: 159.9 MG/DL
GFR SERPLBLD CREATININE-BSD FMLA CKD-EPI: >60 ML/MIN/1.73/M2
GLOBULIN SER-MCNC: 2.8 GM/DL (ref 2.4–3.5)
GLUCOSE SERPL-MCNC: 129 MG/DL (ref 74–100)
HBA1C MFR BLD: 7.2 %
HCT VFR BLD AUTO: 42.3 % (ref 37–47)
HDLC SERPL-MCNC: 49 MG/DL (ref 35–60)
HGB BLD-MCNC: 13.5 G/DL (ref 12–16)
IMM GRANULOCYTES # BLD AUTO: 0.01 X10(3)/MCL (ref 0–0.04)
IMM GRANULOCYTES NFR BLD AUTO: 0.2 %
IRON SATN MFR SERPL: 38 % (ref 20–50)
IRON SERPL-MCNC: 113 UG/DL (ref 50–170)
LDLC SERPL CALC-MCNC: 87 MG/DL (ref 50–140)
LYMPHOCYTES # BLD AUTO: 2.43 X10(3)/MCL (ref 0.6–4.6)
LYMPHOCYTES NFR BLD AUTO: 45.8 %
MCH RBC QN AUTO: 27.8 PG (ref 27–31)
MCHC RBC AUTO-ENTMCNC: 31.9 G/DL (ref 33–36)
MCV RBC AUTO: 87.2 FL (ref 80–94)
MONOCYTES # BLD AUTO: 0.32 X10(3)/MCL (ref 0.1–1.3)
MONOCYTES NFR BLD AUTO: 6 %
NEUTROPHILS # BLD AUTO: 2.46 X10(3)/MCL (ref 2.1–9.2)
NEUTROPHILS NFR BLD AUTO: 46.5 %
NRBC BLD AUTO-RTO: 0 %
PLATELET # BLD AUTO: 285 X10(3)/MCL (ref 130–400)
PMV BLD AUTO: 10.4 FL (ref 7.4–10.4)
POTASSIUM SERPL-SCNC: 4.1 MMOL/L (ref 3.5–5.1)
PROT SERPL-MCNC: 6.4 GM/DL (ref 6.4–8.3)
RBC # BLD AUTO: 4.85 X10(6)/MCL (ref 4.2–5.4)
SODIUM SERPL-SCNC: 139 MMOL/L (ref 136–145)
TIBC SERPL-MCNC: 181 UG/DL (ref 70–310)
TIBC SERPL-MCNC: 294 UG/DL (ref 250–450)
TRANSFERRIN SERPL-MCNC: 260 MG/DL (ref 180–382)
TRIGL SERPL-MCNC: 84 MG/DL (ref 37–140)
VLDLC SERPL CALC-MCNC: 17 MG/DL
WBC # BLD AUTO: 5.3 X10(3)/MCL (ref 4.5–11.5)

## 2025-05-01 PROCEDURE — 83036 HEMOGLOBIN GLYCOSYLATED A1C: CPT

## 2025-05-01 PROCEDURE — 83550 IRON BINDING TEST: CPT

## 2025-05-01 PROCEDURE — 80061 LIPID PANEL: CPT

## 2025-05-01 PROCEDURE — 85025 COMPLETE CBC W/AUTO DIFF WBC: CPT

## 2025-05-01 PROCEDURE — 80053 COMPREHEN METABOLIC PANEL: CPT

## 2025-05-01 PROCEDURE — 36415 COLL VENOUS BLD VENIPUNCTURE: CPT

## 2025-05-06 NOTE — PROVIDER PROGRESS NOTES - EMERGENCY DEPT.
4:30 PM: Reassessed pt at this time. Pt is resting comfortably and appears in no acute distress. There are no psychiatric services offered at this facility. D/w pt all pertinent ED information and plan to transfer to psychiatric facility for psychiatric treatment. Pt verbalizes understanding. Patient being transferred by SPD for ongoing personal protection en route. Pt will be transported by personnel trained in CPR and CPI. All questions and complaints have been addressed at this time.   Accepting Facility: [facility]  Accepting Physician: Dr. Cabrera    
130

## 2025-08-19 ENCOUNTER — LAB VISIT (OUTPATIENT)
Dept: LAB | Facility: HOSPITAL | Age: 60
End: 2025-08-19
Attending: NURSE PRACTITIONER
Payer: MEDICARE

## 2025-08-19 DIAGNOSIS — J44.9 VANISHING LUNG: ICD-10-CM

## 2025-08-19 DIAGNOSIS — D64.9 ANEMIA, UNSPECIFIED TYPE: Primary | ICD-10-CM

## 2025-08-19 DIAGNOSIS — M16.9 HIP ARTHROSIS: ICD-10-CM

## 2025-08-19 DIAGNOSIS — E03.9 MYXEDEMA HEART DISEASE: ICD-10-CM

## 2025-08-19 DIAGNOSIS — E55.9 AVITAMINOSIS D: ICD-10-CM

## 2025-08-19 DIAGNOSIS — I51.9 MYXEDEMA HEART DISEASE: ICD-10-CM

## 2025-08-19 DIAGNOSIS — G25.81 RESTLESS LEGS: ICD-10-CM

## 2025-08-19 DIAGNOSIS — F43.25 ADJUSTMENT DISORDER WITH MIXED DISTURBANCE OF EMOTIONS AND CONDUCT: ICD-10-CM

## 2025-08-19 DIAGNOSIS — E11.65 INADEQUATELY CONTROLLED DIABETES MELLITUS: ICD-10-CM

## 2025-08-19 LAB
ALBUMIN SERPL-MCNC: 3.6 G/DL (ref 3.5–5)
ALBUMIN/GLOB SERPL: 1.1 RATIO (ref 1.1–2)
ALP SERPL-CCNC: 47 UNIT/L (ref 40–150)
ALT SERPL-CCNC: 21 UNIT/L (ref 0–55)
ANION GAP SERPL CALC-SCNC: 8 MEQ/L
AST SERPL-CCNC: 23 UNIT/L (ref 11–45)
BASOPHILS # BLD AUTO: 0.05 X10(3)/MCL
BASOPHILS NFR BLD AUTO: 1.1 %
BILIRUB SERPL-MCNC: 0.5 MG/DL
BUN SERPL-MCNC: 20 MG/DL (ref 9.8–20.1)
CALCIUM SERPL-MCNC: 8.9 MG/DL (ref 8.4–10.2)
CHLORIDE SERPL-SCNC: 103 MMOL/L (ref 98–107)
CO2 SERPL-SCNC: 25 MMOL/L (ref 22–29)
CREAT SERPL-MCNC: 0.68 MG/DL (ref 0.55–1.02)
CREAT/UREA NIT SERPL: 29
EOSINOPHIL # BLD AUTO: 0.06 X10(3)/MCL (ref 0–0.9)
EOSINOPHIL NFR BLD AUTO: 1.3 %
ERYTHROCYTE [DISTWIDTH] IN BLOOD BY AUTOMATED COUNT: 13.1 % (ref 11.5–17)
EST. AVERAGE GLUCOSE BLD GHB EST-MCNC: 148.5 MG/DL
GFR SERPLBLD CREATININE-BSD FMLA CKD-EPI: >60 ML/MIN/1.73/M2
GLOBULIN SER-MCNC: 3.2 GM/DL (ref 2.4–3.5)
GLUCOSE SERPL-MCNC: 120 MG/DL (ref 74–100)
HBA1C MFR BLD: 6.8 %
HCT VFR BLD AUTO: 45.3 % (ref 37–47)
HGB BLD-MCNC: 14.7 G/DL (ref 12–16)
IMM GRANULOCYTES # BLD AUTO: 0.01 X10(3)/MCL (ref 0–0.04)
IMM GRANULOCYTES NFR BLD AUTO: 0.2 %
IRON SATN MFR SERPL: 62 % (ref 20–50)
IRON SERPL-MCNC: 197 UG/DL (ref 50–170)
LYMPHOCYTES # BLD AUTO: 2.01 X10(3)/MCL (ref 0.6–4.6)
LYMPHOCYTES NFR BLD AUTO: 44.7 %
MCH RBC QN AUTO: 31.7 PG (ref 27–31)
MCHC RBC AUTO-ENTMCNC: 32.5 G/DL (ref 33–36)
MCV RBC AUTO: 97.6 FL (ref 80–94)
MONOCYTES # BLD AUTO: 0.31 X10(3)/MCL (ref 0.1–1.3)
MONOCYTES NFR BLD AUTO: 6.9 %
NEUTROPHILS # BLD AUTO: 2.06 X10(3)/MCL (ref 2.1–9.2)
NEUTROPHILS NFR BLD AUTO: 45.8 %
NRBC BLD AUTO-RTO: 0 %
PLATELET # BLD AUTO: 290 X10(3)/MCL (ref 130–400)
PMV BLD AUTO: 9.9 FL (ref 7.4–10.4)
POTASSIUM SERPL-SCNC: 4.6 MMOL/L (ref 3.5–5.1)
PROT SERPL-MCNC: 6.8 GM/DL (ref 6.4–8.3)
RBC # BLD AUTO: 4.64 X10(6)/MCL (ref 4.2–5.4)
SODIUM SERPL-SCNC: 136 MMOL/L (ref 136–145)
T3FREE SERPL-MCNC: 2.5 PG/ML (ref 1.58–3.91)
T4 FREE SERPL-MCNC: 1.29 NG/DL (ref 0.7–1.48)
TIBC SERPL-MCNC: 121 UG/DL (ref 70–310)
TIBC SERPL-MCNC: 318 UG/DL (ref 250–450)
TRANSFERRIN SERPL-MCNC: 273 MG/DL (ref 180–382)
TSH SERPL-ACNC: 1.62 UIU/ML (ref 0.35–4.94)
WBC # BLD AUTO: 4.5 X10(3)/MCL (ref 4.5–11.5)

## 2025-08-19 PROCEDURE — 85025 COMPLETE CBC W/AUTO DIFF WBC: CPT

## 2025-08-19 PROCEDURE — 84481 FREE ASSAY (FT-3): CPT

## 2025-08-19 PROCEDURE — 80053 COMPREHEN METABOLIC PANEL: CPT

## 2025-08-19 PROCEDURE — 84439 ASSAY OF FREE THYROXINE: CPT

## 2025-08-19 PROCEDURE — 84443 ASSAY THYROID STIM HORMONE: CPT

## 2025-08-19 PROCEDURE — 83036 HEMOGLOBIN GLYCOSYLATED A1C: CPT

## 2025-08-19 PROCEDURE — 83540 ASSAY OF IRON: CPT

## 2025-08-19 PROCEDURE — 36415 COLL VENOUS BLD VENIPUNCTURE: CPT
